# Patient Record
Sex: MALE | Race: WHITE
[De-identification: names, ages, dates, MRNs, and addresses within clinical notes are randomized per-mention and may not be internally consistent; named-entity substitution may affect disease eponyms.]

---

## 2015-06-10 VITALS
DIASTOLIC BLOOD PRESSURE: 78 MMHG | SYSTOLIC BLOOD PRESSURE: 153 MMHG | SYSTOLIC BLOOD PRESSURE: 153 MMHG | DIASTOLIC BLOOD PRESSURE: 78 MMHG

## 2017-02-20 ENCOUNTER — HOSPITAL ENCOUNTER (OUTPATIENT)
Dept: HOSPITAL 61 - CT | Age: 67
Discharge: HOME | End: 2017-02-20
Attending: FAMILY MEDICINE
Payer: MEDICARE

## 2017-02-20 DIAGNOSIS — I25.10: ICD-10-CM

## 2017-02-20 DIAGNOSIS — R63.4: ICD-10-CM

## 2017-02-20 DIAGNOSIS — R91.1: ICD-10-CM

## 2017-02-20 DIAGNOSIS — F17.210: Primary | ICD-10-CM

## 2017-02-20 DIAGNOSIS — N28.1: ICD-10-CM

## 2017-02-20 DIAGNOSIS — J43.9: ICD-10-CM

## 2017-02-20 PROCEDURE — 71260 CT THORAX DX C+: CPT

## 2017-02-20 NOTE — RAD
CT of the chest with contrast, 2/20/2017:



History: Weight loss, smoker



Multidetector CT imaging was performed following an IV bolus injection of

iodinated contrast material.



There is calcific plaquing of the thoracic aorta without evidence of aneurysm.

Scattered coronary artery calcifications are also present. Several small

mediastinal and bilateral hilar lymph nodes are seen without definite

pathologic enlargement.



There is a small area of pleural thickening laterally in the left upper lobe

with minimal underlying linear and groundglass opacity compatible with

scarring. There are mild emphysematous changes in the lungs. There are a few

other small scattered peripheral linear opacities compatible with scars.



A small nodule is noted in the left lower lobe along the posterior aspect of

the left hilum as best delineated on axial images 80 and 89 of series #6. It

measures approximately 8-9 mm. It lies directly adjacent to several pulmonary

veins but is larger these adjacent branching structures. There is a focus of

increased density within this structure, although it is unclear whether this

represents an enhancing vessel or a calcification on these postcontrast scans.



No other pulmonary nodule or mass is seen. There is no evidence of pleural

fluid.



Two right renal cysts and at least one small left renal cyst are noted. The

largest cyst measures 6 cm and lies in the upper pole the right kidney. No

adrenal abnormality is detected.





IMPRESSION:

1. Emphysema with pleural/parenchymal scarring.

2. Small left lower lobe pulmonary nodule containing a small density

compatible with a calcification versus an enhancing vessel. Short interval CT

follow-up to include pre and postcontrast scans is suggested for further

evaluation. 

3. Calcific plaquing of the aorta and coronary arteries.

4. Bilateral renal cysts.











PQRS Compliance Statement:



One or more of the following individualized dose reduction techniques were

utilized for this examination:

1. Automated exposure control

2. Adjustment of the mA and/or kV according to patient size

3. Use of iterative reconstruction technique

## 2017-04-10 ENCOUNTER — HOSPITAL ENCOUNTER (OUTPATIENT)
Dept: HOSPITAL 61 - KCIC | Age: 67
Discharge: HOME | End: 2017-04-10
Attending: PHYSICIAN ASSISTANT
Payer: MEDICARE

## 2017-04-10 DIAGNOSIS — F17.200: ICD-10-CM

## 2017-04-10 DIAGNOSIS — M54.9: ICD-10-CM

## 2017-04-10 DIAGNOSIS — J43.9: Primary | ICD-10-CM

## 2017-04-10 PROCEDURE — 71020: CPT

## 2017-04-10 NOTE — [facility]
PROCEDURE 

Two view chest radiograph. 

 

HISTORY 

Abnormal lung sounds. Left-sided pain for 3-4 days. Smoking history. COPD.

 

 

TECHNIQUE 

Two-view chest radiograph was obtained. 

 

COMPARISON 

CT from February 20, 2017 believed to be the same patient. 

 

FINDINGS 

The lungs are mildly hyperinflated with flattening of the diaphragm and 

increase in retrosternal clear space. There is no airspace disease. There 

is no pleural effusion. The heart is not enlarged. There is no heart 

failure. Bony structures are intact. 

 

IMPRESSION 

No acute thoracic findings. Hyperinflation compatible with emphysema. 

 

Electronically signed by: Ethan Aguero MD (Apr 10, 2017 13:41:59)

## 2017-04-25 VITALS — SYSTOLIC BLOOD PRESSURE: 137 MMHG | DIASTOLIC BLOOD PRESSURE: 72 MMHG

## 2017-08-14 ENCOUNTER — HOSPITAL ENCOUNTER (OUTPATIENT)
Dept: HOSPITAL 61 - KCIC | Age: 67
Discharge: HOME | End: 2017-08-14
Attending: PHYSICIAN ASSISTANT
Payer: COMMERCIAL

## 2017-08-14 DIAGNOSIS — J20.9: ICD-10-CM

## 2017-08-14 DIAGNOSIS — J44.9: Primary | ICD-10-CM

## 2017-08-14 PROCEDURE — 71020: CPT

## 2017-08-14 NOTE — [facility]
Indication: COPD and acute bronchitis

 

Technique: Two-view chest radiograph was obtained. Comparison is from 

April 10, 2017.

 

Findings: The lungs are clear. They remain hyperinflated. The 

cardiopulmonary silhouette is within normal limits. There is mild 

atheromatous disease in the thoracic aorta. There is no bronchial wall 

thickening. There is no pleural effusion. There are degenerative changes 

in the spine, minimal. 

 

Impression:

No active pulmonary disease.

 

Electronically signed by: Ethan Aguero MD (8/14/2017 2:38 PM) 

[id number]

## 2017-12-23 ENCOUNTER — HOSPITAL ENCOUNTER (EMERGENCY)
Dept: HOSPITAL 61 - ER | Age: 67
Discharge: LEFT BEFORE DISCHARGE | End: 2017-12-23
Payer: COMMERCIAL

## 2017-12-23 DIAGNOSIS — I10: ICD-10-CM

## 2017-12-23 DIAGNOSIS — I21.9: ICD-10-CM

## 2017-12-23 DIAGNOSIS — Z88.5: ICD-10-CM

## 2017-12-23 DIAGNOSIS — Z90.49: ICD-10-CM

## 2017-12-23 DIAGNOSIS — J44.1: Primary | ICD-10-CM

## 2017-12-23 DIAGNOSIS — Z88.0: ICD-10-CM

## 2017-12-23 DIAGNOSIS — I25.10: ICD-10-CM

## 2017-12-23 DIAGNOSIS — Z86.73: ICD-10-CM

## 2017-12-23 DIAGNOSIS — E78.00: ICD-10-CM

## 2017-12-23 LAB
ADD MAN DIFF?: NO
ALBUMIN SERPL-MCNC: 3.6 G/DL (ref 3.4–5)
ALP SERPL-CCNC: 87 U/L (ref 46–116)
ALT (SGPT): 29 U/L (ref 16–63)
ANION GAP SERPL CALC-SCNC: 7 MMOL/L (ref 6–14)
AST SERPL-CCNC: 26 U/L (ref 15–37)
BASO #: 0.1 X10^3/UL (ref 0–0.2)
BASO %: 1 % (ref 0–3)
BILIRUB DIRECT SERPL-MCNC: 0.1 MG/DL (ref 0–0.2)
BLOOD UREA NITROGEN: 17 MG/DL (ref 8–26)
CALCIUM: 8.8 MG/DL (ref 8.5–10.1)
CHLORIDE: 105 MMOL/L (ref 98–107)
CO2 SERPL-SCNC: 32 MMOL/L (ref 21–32)
CREAT SERPL-MCNC: 0.9 MG/DL (ref 0.7–1.3)
EOS %: 3 % (ref 0–3)
GFR SERPLBLD BASED ON 1.73 SQ M-ARVRAT: 83.9 ML/MIN
GLUCOSE SERPL-MCNC: 124 MG/DL (ref 70–99)
HCG SERPL-ACNC: 8.6 X10^3/UL (ref 4–11)
HEMATOCRIT: 44.4 % (ref 39–53)
HEMOGLOBIN: 15 G/DL (ref 13–17.5)
LYMPH #: 1.2 X10^3/UL (ref 1–4.8)
LYMPH %: 14 % (ref 24–48)
MEAN CORPUSCULAR HEMOGLOBIN: 30 PG (ref 25–35)
MEAN CORPUSCULAR HGB CONC: 34 G/DL (ref 31–37)
MEAN CORPUSCULAR VOLUME: 89 FL (ref 79–100)
MONO %: 6 % (ref 0–9)
NEUT %: 76 % (ref 31–73)
NT-PRO BNP: 1485 PG/ML (ref 0–124)
PLATELET COUNT: 178 X10^3/UL (ref 140–400)
POTASSIUM SERPL-SCNC: 3.3 MMOL/L (ref 3.5–5.1)
RED BLOOD COUNT: 4.97 X10^6/UL (ref 4.3–5.7)
RED CELL DISTRIBUTION WIDTH: 15 % (ref 11.5–14.5)
SODIUM: 144 MMOL/L (ref 136–145)
TOTAL BILIRUBIN: 0.3 MG/DL (ref 0.2–1)
TOTAL PROTEIN: 7.6 G/DL (ref 6.4–8.2)
TROPONINI: 0.1 NG/ML (ref 0–0.06)

## 2017-12-23 PROCEDURE — 71010: CPT

## 2017-12-23 PROCEDURE — 93005 ELECTROCARDIOGRAM TRACING: CPT

## 2017-12-23 PROCEDURE — 83690 ASSAY OF LIPASE: CPT

## 2017-12-23 PROCEDURE — 85025 COMPLETE CBC W/AUTO DIFF WBC: CPT

## 2017-12-23 PROCEDURE — 99285 EMERGENCY DEPT VISIT HI MDM: CPT

## 2017-12-23 PROCEDURE — 96374 THER/PROPH/DIAG INJ IV PUSH: CPT

## 2017-12-23 PROCEDURE — 36415 COLL VENOUS BLD VENIPUNCTURE: CPT

## 2017-12-23 PROCEDURE — 83880 ASSAY OF NATRIURETIC PEPTIDE: CPT

## 2017-12-23 PROCEDURE — 80076 HEPATIC FUNCTION PANEL: CPT

## 2017-12-23 PROCEDURE — 84484 ASSAY OF TROPONIN QUANT: CPT

## 2017-12-23 PROCEDURE — 80048 BASIC METABOLIC PNL TOTAL CA: CPT

## 2017-12-23 PROCEDURE — 94640 AIRWAY INHALATION TREATMENT: CPT

## 2017-12-23 RX ADMIN — IPRATROPIUM BROMIDE AND ALBUTEROL SULFATE 1 ML: .5; 3 SOLUTION RESPIRATORY (INHALATION) at 22:44

## 2017-12-23 RX ADMIN — ASPIRIN 81 MG 1 MG: 81 TABLET ORAL at 22:33

## 2017-12-23 RX ADMIN — METHYLPREDNISOLONE SODIUM SUCCINATE 1 MG: 125 INJECTION, POWDER, FOR SOLUTION INTRAMUSCULAR; INTRAVENOUS at 21:29

## 2017-12-23 RX ADMIN — IPRATROPIUM BROMIDE AND ALBUTEROL SULFATE 1 ML: .5; 3 SOLUTION RESPIRATORY (INHALATION) at 21:23

## 2018-01-11 ENCOUNTER — HOSPITAL ENCOUNTER (OUTPATIENT)
Dept: HOSPITAL 61 - ECHO | Age: 68
Discharge: HOME | End: 2018-01-11
Attending: PHYSICIAN ASSISTANT
Payer: COMMERCIAL

## 2018-01-11 DIAGNOSIS — I10: Primary | ICD-10-CM

## 2018-01-11 DIAGNOSIS — Z86.73: ICD-10-CM

## 2018-01-11 DIAGNOSIS — R79.89: ICD-10-CM

## 2018-01-11 DIAGNOSIS — I49.3: ICD-10-CM

## 2018-01-11 DIAGNOSIS — J44.9: ICD-10-CM

## 2018-01-11 DIAGNOSIS — F17.210: ICD-10-CM

## 2018-01-11 DIAGNOSIS — I73.9: ICD-10-CM

## 2018-01-11 PROCEDURE — 93017 CV STRESS TEST TRACING ONLY: CPT

## 2018-01-11 PROCEDURE — 93350 STRESS TTE ONLY: CPT

## 2018-02-02 ENCOUNTER — HOSPITAL ENCOUNTER (OUTPATIENT)
Dept: HOSPITAL 61 - CT | Age: 68
Discharge: HOME | End: 2018-02-02
Attending: FAMILY MEDICINE
Payer: COMMERCIAL

## 2018-02-02 DIAGNOSIS — J43.9: ICD-10-CM

## 2018-02-02 DIAGNOSIS — Z12.2: Primary | ICD-10-CM

## 2018-02-02 DIAGNOSIS — I25.10: ICD-10-CM

## 2018-02-02 DIAGNOSIS — N20.0: ICD-10-CM

## 2018-02-02 DIAGNOSIS — R91.8: ICD-10-CM

## 2018-02-02 PROCEDURE — 71250 CT THORAX DX C-: CPT

## 2018-02-09 ENCOUNTER — HOSPITAL ENCOUNTER (OUTPATIENT)
Dept: HOSPITAL 61 - CCL | Age: 68
Discharge: HOME | End: 2018-02-09
Attending: INTERNAL MEDICINE
Payer: COMMERCIAL

## 2018-02-09 DIAGNOSIS — Z86.69: ICD-10-CM

## 2018-02-09 DIAGNOSIS — Z88.0: ICD-10-CM

## 2018-02-09 DIAGNOSIS — J43.9: ICD-10-CM

## 2018-02-09 DIAGNOSIS — Z88.6: ICD-10-CM

## 2018-02-09 DIAGNOSIS — Z72.0: ICD-10-CM

## 2018-02-09 DIAGNOSIS — F17.200: ICD-10-CM

## 2018-02-09 DIAGNOSIS — I10: ICD-10-CM

## 2018-02-09 DIAGNOSIS — Z86.73: ICD-10-CM

## 2018-02-09 DIAGNOSIS — I25.10: Primary | ICD-10-CM

## 2018-02-09 LAB
ANION GAP SERPL CALC-SCNC: 6 MMOL/L (ref 6–14)
BLOOD UREA NITROGEN: 25 MG/DL (ref 8–26)
CALCIUM: 8.5 MG/DL (ref 8.5–10.1)
CHLORIDE: 105 MMOL/L (ref 98–107)
CO2 SERPL-SCNC: 32 MMOL/L (ref 21–32)
CREAT SERPL-MCNC: 0.9 MG/DL (ref 0.7–1.3)
GFR SERPLBLD BASED ON 1.73 SQ M-ARVRAT: 83.9 ML/MIN
GLUCOSE SERPL-MCNC: 100 MG/DL (ref 70–99)
HCG SERPL-ACNC: 6.9 X10^3/UL (ref 4–11)
HEMATOCRIT: 44.2 % (ref 39–53)
HEMOGLOBIN: 14.4 G/DL (ref 13–17.5)
INR: 1 (ref 0.8–1.1)
MEAN CORPUSCULAR HEMOGLOBIN: 29 PG (ref 25–35)
MEAN CORPUSCULAR HGB CONC: 33 G/DL (ref 31–37)
MEAN CORPUSCULAR VOLUME: 88 FL (ref 79–100)
PLATELET COUNT: 194 X10^3/UL (ref 140–400)
POTASSIUM SERPL-SCNC: 3.8 MMOL/L (ref 3.5–5.1)
PROTHROMBIN TIME PATIENT: 12.5 SEC (ref 11.7–14)
RED BLOOD COUNT: 5.02 X10^6/UL (ref 4.3–5.7)
RED CELL DISTRIBUTION WIDTH: 15.3 % (ref 11.5–14.5)
SODIUM: 143 MMOL/L (ref 136–145)

## 2018-02-09 PROCEDURE — 93458 L HRT ARTERY/VENTRICLE ANGIO: CPT

## 2018-02-09 PROCEDURE — 85027 COMPLETE CBC AUTOMATED: CPT

## 2018-02-09 PROCEDURE — 36247 INS CATH ABD/L-EXT ART 3RD: CPT

## 2018-02-09 PROCEDURE — 85610 PROTHROMBIN TIME: CPT

## 2018-02-09 PROCEDURE — 99152 MOD SED SAME PHYS/QHP 5/>YRS: CPT

## 2018-02-09 PROCEDURE — 75630 X-RAY AORTA LEG ARTERIES: CPT

## 2018-02-09 PROCEDURE — 80048 BASIC METABOLIC PNL TOTAL CA: CPT

## 2018-02-09 PROCEDURE — 99153 MOD SED SAME PHYS/QHP EA: CPT

## 2018-02-09 PROCEDURE — 36415 COLL VENOUS BLD VENIPUNCTURE: CPT

## 2018-02-09 RX ADMIN — LIDOCAINE HYDROCHLORIDE 1 ML: 20 INJECTION, SOLUTION INFILTRATION; PERINEURAL at 08:40

## 2018-02-09 RX ADMIN — MIDAZOLAM HYDROCHLORIDE 1 MG: 1 INJECTION, SOLUTION INTRAMUSCULAR; INTRAVENOUS at 08:42

## 2018-02-09 RX ADMIN — FENTANYL CITRATE 1 MCG: 50 INJECTION INTRAMUSCULAR; INTRAVENOUS at 08:41

## 2018-02-09 RX ADMIN — HEPARIN SODIUM IN SODIUM CHLORIDE 1 UNIT: 200 INJECTION INTRAVENOUS at 08:42

## 2018-02-09 RX ADMIN — IODIXANOL 2 ML: 320 INJECTION, SOLUTION INTRAVASCULAR at 08:00

## 2018-02-15 ENCOUNTER — HOSPITAL ENCOUNTER (OUTPATIENT)
Dept: HOSPITAL 61 - PETSC | Age: 68
Discharge: HOME | End: 2018-02-15
Attending: PHYSICIAN ASSISTANT
Payer: COMMERCIAL

## 2018-02-15 DIAGNOSIS — N40.0: ICD-10-CM

## 2018-02-15 DIAGNOSIS — I25.10: ICD-10-CM

## 2018-02-15 DIAGNOSIS — N28.1: ICD-10-CM

## 2018-02-15 DIAGNOSIS — J43.2: Primary | ICD-10-CM

## 2018-02-15 PROCEDURE — 78815 PET IMAGE W/CT SKULL-THIGH: CPT

## 2018-02-27 ENCOUNTER — HOSPITAL ENCOUNTER (OUTPATIENT)
Dept: HOSPITAL 61 - CCL | Age: 68
Setting detail: OBSERVATION
LOS: 1 days | Discharge: HOME | End: 2018-02-28
Attending: INTERNAL MEDICINE | Admitting: INTERNAL MEDICINE
Payer: COMMERCIAL

## 2018-02-27 DIAGNOSIS — I73.9: Primary | ICD-10-CM

## 2018-02-27 DIAGNOSIS — I49.3: ICD-10-CM

## 2018-02-27 DIAGNOSIS — Z88.0: ICD-10-CM

## 2018-02-27 LAB
ANION GAP SERPL CALC-SCNC: 7 MMOL/L (ref 6–14)
BLOOD UREA NITROGEN: 22 MG/DL (ref 8–26)
CALCIUM: 9.3 MG/DL (ref 8.5–10.1)
CHLORIDE: 104 MMOL/L (ref 98–107)
CO2 SERPL-SCNC: 31 MMOL/L (ref 21–32)
CREAT SERPL-MCNC: 1 MG/DL (ref 0.7–1.3)
GFR SERPLBLD BASED ON 1.73 SQ M-ARVRAT: 74.3 ML/MIN
GLUCOSE SERPL-MCNC: 94 MG/DL (ref 70–99)
HCG SERPL-ACNC: 8.1 X10^3/UL (ref 4–11)
HEMATOCRIT: 43.2 % (ref 39–53)
HEMOGLOBIN: 14.1 G/DL (ref 13–17.5)
INR: 1 (ref 0.8–1.1)
ISTAT ACT: 180 SEC (ref 92–181)
ISTAT ACT: 249 SEC (ref 92–181)
ISTAT ACT: 266 SEC (ref 92–181)
MEAN CORPUSCULAR HEMOGLOBIN: 29 PG (ref 25–35)
MEAN CORPUSCULAR HGB CONC: 33 G/DL (ref 31–37)
MEAN CORPUSCULAR VOLUME: 88 FL (ref 79–100)
PLATELET COUNT: 166 X10^3/UL (ref 140–400)
POTASSIUM SERPL-SCNC: 3.5 MMOL/L (ref 3.5–5.1)
PROTHROMBIN TIME PATIENT: 12.5 SEC (ref 11.7–14)
RED BLOOD COUNT: 4.93 X10^6/UL (ref 4.3–5.7)
RED CELL DISTRIBUTION WIDTH: 16 % (ref 11.5–14.5)
SODIUM: 142 MMOL/L (ref 136–145)

## 2018-02-27 PROCEDURE — 99153 MOD SED SAME PHYS/QHP EA: CPT

## 2018-02-27 PROCEDURE — 85027 COMPLETE CBC AUTOMATED: CPT

## 2018-02-27 PROCEDURE — 85610 PROTHROMBIN TIME: CPT

## 2018-02-27 PROCEDURE — 93005 ELECTROCARDIOGRAM TRACING: CPT

## 2018-02-27 PROCEDURE — 36415 COLL VENOUS BLD VENIPUNCTURE: CPT

## 2018-02-27 PROCEDURE — 96374 THER/PROPH/DIAG INJ IV PUSH: CPT

## 2018-02-27 PROCEDURE — 37226: CPT

## 2018-02-27 PROCEDURE — 96375 TX/PRO/DX INJ NEW DRUG ADDON: CPT

## 2018-02-27 PROCEDURE — 80048 BASIC METABOLIC PNL TOTAL CA: CPT

## 2018-02-27 PROCEDURE — 94640 AIRWAY INHALATION TREATMENT: CPT

## 2018-02-27 PROCEDURE — 85347 COAGULATION TIME ACTIVATED: CPT

## 2018-02-27 PROCEDURE — 99152 MOD SED SAME PHYS/QHP 5/>YRS: CPT

## 2018-02-27 RX ADMIN — MIDAZOLAM HYDROCHLORIDE 1 MG: 1 INJECTION, SOLUTION INTRAMUSCULAR; INTRAVENOUS at 10:44

## 2018-02-27 RX ADMIN — HEPARIN SODIUM 2 UNIT: 1000 INJECTION, SOLUTION INTRAVENOUS; SUBCUTANEOUS at 10:47

## 2018-02-27 RX ADMIN — IPRATROPIUM BROMIDE AND ALBUTEROL SULFATE 1 ML: .5; 3 SOLUTION RESPIRATORY (INHALATION) at 20:37

## 2018-02-27 RX ADMIN — ACETAMINOPHEN 1 MG: 325 TABLET, FILM COATED ORAL at 20:31

## 2018-02-27 RX ADMIN — FENTANYL CITRATE 1 MCG: 50 INJECTION INTRAMUSCULAR; INTRAVENOUS at 10:44

## 2018-02-27 RX ADMIN — IPRATROPIUM BROMIDE AND ALBUTEROL SULFATE 1 ML: .5; 3 SOLUTION RESPIRATORY (INHALATION) at 15:54

## 2018-02-27 RX ADMIN — NITROGLYCERIN 1 MCG: 5 INJECTION, SOLUTION INTRAVENOUS at 10:55

## 2018-02-27 RX ADMIN — SIMVASTATIN 1 MG: 40 TABLET, FILM COATED ORAL at 20:28

## 2018-02-27 RX ADMIN — HEPARIN SODIUM IN SODIUM CHLORIDE 2 UNIT: 200 INJECTION INTRAVENOUS at 10:40

## 2018-02-27 RX ADMIN — LIDOCAINE HYDROCHLORIDE 1 ML: 20 INJECTION, SOLUTION INFILTRATION; PERINEURAL at 10:43

## 2018-02-27 RX ADMIN — IODIXANOL 1 ML: 320 INJECTION, SOLUTION INTRAVASCULAR at 10:45

## 2018-02-27 RX ADMIN — PROTAMINE SULFATE 1 MG: 10 INJECTION, SOLUTION INTRAVENOUS at 11:30

## 2018-02-28 RX ADMIN — CLOPIDOGREL BISULFATE 1 MG: 75 TABLET ORAL at 08:32

## 2018-02-28 RX ADMIN — IPRATROPIUM BROMIDE AND ALBUTEROL SULFATE 1 ML: .5; 3 SOLUTION RESPIRATORY (INHALATION) at 08:10

## 2018-02-28 RX ADMIN — IPRATROPIUM BROMIDE AND ALBUTEROL SULFATE 1 ML: .5; 3 SOLUTION RESPIRATORY (INHALATION) at 11:57

## 2018-02-28 RX ADMIN — ASPIRIN 1 MG: 81 TABLET, COATED ORAL at 08:32

## 2018-02-28 RX ADMIN — LOSARTAN POTASSIUM 1 MG: 50 TABLET ORAL at 08:32

## 2018-06-19 ENCOUNTER — HOSPITAL ENCOUNTER (OUTPATIENT)
Dept: HOSPITAL 61 - KCIC CT | Age: 68
Discharge: HOME | End: 2018-06-19
Attending: FAMILY MEDICINE
Payer: COMMERCIAL

## 2018-06-19 DIAGNOSIS — R91.8: Primary | ICD-10-CM

## 2018-06-19 PROCEDURE — 71250 CT THORAX DX C-: CPT

## 2019-02-28 ENCOUNTER — HOSPITAL ENCOUNTER (INPATIENT)
Dept: HOSPITAL 61 - ER | Age: 69
LOS: 6 days | Discharge: HOME HEALTH | DRG: 871 | End: 2019-03-06
Attending: FAMILY MEDICINE | Admitting: FAMILY MEDICINE
Payer: COMMERCIAL

## 2019-02-28 VITALS — BODY MASS INDEX: 23.75 KG/M2 | HEIGHT: 72 IN | WEIGHT: 175.37 LBS

## 2019-02-28 DIAGNOSIS — Z99.81: ICD-10-CM

## 2019-02-28 DIAGNOSIS — Z88.0: ICD-10-CM

## 2019-02-28 DIAGNOSIS — Z87.01: ICD-10-CM

## 2019-02-28 DIAGNOSIS — Z79.01: ICD-10-CM

## 2019-02-28 DIAGNOSIS — Z88.8: ICD-10-CM

## 2019-02-28 DIAGNOSIS — Z86.73: ICD-10-CM

## 2019-02-28 DIAGNOSIS — J96.21: ICD-10-CM

## 2019-02-28 DIAGNOSIS — I25.10: ICD-10-CM

## 2019-02-28 DIAGNOSIS — D69.6: ICD-10-CM

## 2019-02-28 DIAGNOSIS — I47.1: ICD-10-CM

## 2019-02-28 DIAGNOSIS — A41.9: Primary | ICD-10-CM

## 2019-02-28 DIAGNOSIS — F17.211: ICD-10-CM

## 2019-02-28 DIAGNOSIS — Z86.718: ICD-10-CM

## 2019-02-28 DIAGNOSIS — J44.0: ICD-10-CM

## 2019-02-28 DIAGNOSIS — Z90.49: ICD-10-CM

## 2019-02-28 DIAGNOSIS — I10: ICD-10-CM

## 2019-02-28 DIAGNOSIS — I73.9: ICD-10-CM

## 2019-02-28 DIAGNOSIS — E78.00: ICD-10-CM

## 2019-02-28 DIAGNOSIS — J44.1: ICD-10-CM

## 2019-02-28 DIAGNOSIS — E78.5: ICD-10-CM

## 2019-02-28 DIAGNOSIS — I24.8: ICD-10-CM

## 2019-02-28 DIAGNOSIS — J18.9: ICD-10-CM

## 2019-02-28 DIAGNOSIS — J96.22: ICD-10-CM

## 2019-02-28 LAB
ALBUMIN SERPL-MCNC: 3.2 G/DL (ref 3.4–5)
ALBUMIN/GLOB SERPL: 0.7 {RATIO} (ref 1–1.7)
ALP SERPL-CCNC: 103 U/L (ref 46–116)
ALT SERPL-CCNC: 51 U/L (ref 16–63)
ANION GAP SERPL CALC-SCNC: 4 MMOL/L (ref 6–14)
AST SERPL-CCNC: 91 U/L (ref 15–37)
BASE EXCESS ABG: 5 MMOL/L (ref -3–3)
BASOPHILS # BLD AUTO: 0 X10^3/UL (ref 0–0.2)
BASOPHILS NFR BLD: 1 % (ref 0–3)
BILIRUB SERPL-MCNC: 0.3 MG/DL (ref 0.2–1)
BUN SERPL-MCNC: 29 MG/DL (ref 8–26)
BUN/CREAT SERPL: 29 (ref 6–20)
CALCIUM SERPL-MCNC: 8.8 MG/DL (ref 8.5–10.1)
CHLORIDE SERPL-SCNC: 104 MMOL/L (ref 98–107)
CO2 SERPL-SCNC: 38 MMOL/L (ref 21–32)
CREAT SERPL-MCNC: 1 MG/DL (ref 0.7–1.3)
EOSINOPHIL NFR BLD: 0 % (ref 0–3)
EOSINOPHIL NFR BLD: 0 X10^3/UL (ref 0–0.7)
ERYTHROCYTE [DISTWIDTH] IN BLOOD BY AUTOMATED COUNT: 16.2 % (ref 11.5–14.5)
GFR SERPLBLD BASED ON 1.73 SQ M-ARVRAT: 74.1 ML/MIN
GLOBULIN SER-MCNC: 4.7 G/DL (ref 2.2–3.8)
GLUCOSE SERPL-MCNC: 103 MG/DL (ref 70–99)
HCO3 BLDA-SCNC: 36 MMOL/L (ref 21–28)
HCT VFR BLD CALC: 46 % (ref 39–53)
HGB BLD-MCNC: 14.8 G/DL (ref 13–17.5)
INSPIRATION SETTING TIME VENT: 50
LYMPHOCYTES # BLD: 0.5 X10^3/UL (ref 1–4.8)
LYMPHOCYTES NFR BLD AUTO: 10 % (ref 24–48)
MCH RBC QN AUTO: 28 PG (ref 25–35)
MCHC RBC AUTO-ENTMCNC: 32 G/DL (ref 31–37)
MCV RBC AUTO: 88 FL (ref 79–100)
MONO #: 0.3 X10^3/UL (ref 0–1.1)
MONOCYTES NFR BLD: 7 % (ref 0–9)
NEUT #: 4.2 X10^3UL (ref 1.8–7.7)
NEUTROPHILS NFR BLD AUTO: 83 % (ref 31–73)
PCO2 BLDA: 92 MMHG (ref 35–46)
PLATELET # BLD AUTO: 167 X10^3/UL (ref 140–400)
PO2 BLDA: 65 MMHG (ref 65–108)
POTASSIUM SERPL-SCNC: 4.1 MMOL/L (ref 3.5–5.1)
PROT SERPL-MCNC: 7.9 G/DL (ref 6.4–8.2)
RBC # BLD AUTO: 5.22 X10^6/UL (ref 4.3–5.7)
SAO2 % BLDA: 92 % (ref 92–99)
SODIUM SERPL-SCNC: 146 MMOL/L (ref 136–145)
WBC # BLD AUTO: 5 X10^3/UL (ref 4–11)

## 2019-02-28 PROCEDURE — 71275 CT ANGIOGRAPHY CHEST: CPT

## 2019-02-28 PROCEDURE — 94799 UNLISTED PULMONARY SVC/PX: CPT

## 2019-02-28 PROCEDURE — 83605 ASSAY OF LACTIC ACID: CPT

## 2019-02-28 PROCEDURE — 93306 TTE W/DOPPLER COMPLETE: CPT

## 2019-02-28 PROCEDURE — 5A09457 ASSISTANCE WITH RESPIRATORY VENTILATION, 24-96 CONSECUTIVE HOURS, CONTINUOUS POSITIVE AIRWAY PRESSURE: ICD-10-PCS | Performed by: FAMILY MEDICINE

## 2019-02-28 PROCEDURE — 96365 THER/PROPH/DIAG IV INF INIT: CPT

## 2019-02-28 PROCEDURE — 96368 THER/DIAG CONCURRENT INF: CPT

## 2019-02-28 PROCEDURE — 94644 CONT INHLJ TX 1ST HOUR: CPT

## 2019-02-28 PROCEDURE — 84484 ASSAY OF TROPONIN QUANT: CPT

## 2019-02-28 PROCEDURE — 87641 MR-STAPH DNA AMP PROBE: CPT

## 2019-02-28 PROCEDURE — 85379 FIBRIN DEGRADATION QUANT: CPT

## 2019-02-28 PROCEDURE — 85025 COMPLETE CBC W/AUTO DIFF WBC: CPT

## 2019-02-28 PROCEDURE — 36415 COLL VENOUS BLD VENIPUNCTURE: CPT

## 2019-02-28 PROCEDURE — 80048 BASIC METABOLIC PNL TOTAL CA: CPT

## 2019-02-28 PROCEDURE — 85049 AUTOMATED PLATELET COUNT: CPT

## 2019-02-28 PROCEDURE — 93005 ELECTROCARDIOGRAM TRACING: CPT

## 2019-02-28 PROCEDURE — 83735 ASSAY OF MAGNESIUM: CPT

## 2019-02-28 PROCEDURE — 85007 BL SMEAR W/DIFF WBC COUNT: CPT

## 2019-02-28 PROCEDURE — 94618 PULMONARY STRESS TESTING: CPT

## 2019-02-28 PROCEDURE — 82805 BLOOD GASES W/O2 SATURATION: CPT

## 2019-02-28 PROCEDURE — 36600 WITHDRAWAL OF ARTERIAL BLOOD: CPT

## 2019-02-28 PROCEDURE — 87040 BLOOD CULTURE FOR BACTERIA: CPT

## 2019-02-28 PROCEDURE — 71045 X-RAY EXAM CHEST 1 VIEW: CPT

## 2019-02-28 PROCEDURE — 96375 TX/PRO/DX INJ NEW DRUG ADDON: CPT

## 2019-02-28 PROCEDURE — 83880 ASSAY OF NATRIURETIC PEPTIDE: CPT

## 2019-02-28 PROCEDURE — 94760 N-INVAS EAR/PLS OXIMETRY 1: CPT

## 2019-02-28 PROCEDURE — 94640 AIRWAY INHALATION TREATMENT: CPT

## 2019-02-28 PROCEDURE — 80053 COMPREHEN METABOLIC PANEL: CPT

## 2019-02-28 PROCEDURE — 85520 HEPARIN ASSAY: CPT

## 2019-02-28 PROCEDURE — 96361 HYDRATE IV INFUSION ADD-ON: CPT

## 2019-02-28 PROCEDURE — 80061 LIPID PANEL: CPT

## 2019-02-28 PROCEDURE — 94660 CPAP INITIATION&MGMT: CPT

## 2019-02-28 RX ADMIN — BACITRACIN SCH MLS/HR: 5000 INJECTION, POWDER, FOR SOLUTION INTRAMUSCULAR at 23:30

## 2019-02-28 NOTE — RAD
EXAM: AP View of the chest

 

DATE: 2/28/2019 9:59 PM

 

INDICATION: Dyspnea

 

COMPARISON: 8/14/2017 

 

FINDINGS:

The heart is not enlarged.

 

Mediastinal and hilar contours are stable. Aortic calcifications are seen.

 

Emphysematous changes are seen. No lobar consolidation. Linear opacities 

right midlung and left lung base likely scarring/atelectasis. 

 

No pleural effusion or pneumothorax.

 

IMPRESSION:

1.  No radiographic evidence for acute cardiopulmonary process.

 

Electronically signed by: Matthew Hardy MD (2/28/2019 11:28 PM) [id number]

## 2019-02-28 NOTE — PHYS DOC
Past Medical History


Past Medical History:  CAD, COPD, CVA, DVT, High Cholesterol, Hypertension


Additional Past Medical Histor:  emphysema


Past Surgical History:  Cholecystectomy, Other


Additional Past Surgical Histo:  Left femoral bypass 2007, Neck


Alcohol Use:  None


Drug Use:  None





Adult General


Chief Complaint


Chief Complaint:  DYSPNEA/RESPIRATOY DISTRESS





HPI


HPI





Patient is a 68-year-old male who presents with report of respiratory distress. 

Patient reportedly has been short of breath for the last couple of days and 

today symptoms have gotten much worse. Patient had been diagnosed with 

pneumonia about 5 days ago and has been taking antibiotics. Medics indicate 

that patient had taken somewhere between 10 and 12 breathing treatments before 

they had arrived that have been over the last 24 hours. They state that upon 

their arrival patient was on 3 L of oxygen and was satting somewhere around 88%

. Patient was clearly fatigued and by the time patient had gotten out to the 

truck his oxygen saturation had dropped down to the 60s. Patient was placed on 

CPAP while in route. Additional history is limited due to severity of patient 

condition.





Review of Systems


Review of Systems





Constitutional: Denies fever or chills []


Respiratory: Positive cough and shortness of breath []


Cardiovascular: No additional information not addressed in HPI []


GI: Denies abdominal pain []


Integument: Denies rash or skin lesions []


Neurologic: Denies headache, focal weakness or sensory changes []





All other systems were reviewed and found to be within normal limits, except as 

documented in this note.





Current Medications


Current Medications





Current Medications








 Medications


  (Trade)  Dose


 Ordered  Sig/Tin  Start Time


 Stop Time Status Last Admin


Dose Admin


 


 Acetaminophen


  (Tylenol)  650 mg  PRN Q4HRS  PRN  2/28/19 23:15


 3/1/19 23:14   





 


 Albuterol Sulfate


  (Ventolin Neb


 Soln)  10 mg  1X  ONCE  2/28/19 22:30


 2/28/19 22:31 DC 2/28/19 22:13


10 MG


 


 Budesonide


  (Pulmicort)  0.5 mg  STK-MED ONCE  2/28/19 22:03


 2/28/19 22:04 DC  





 


 Heparin Sodium


  (Porcine)


  (Heparin Sodium)  1,950 unit  PRN Q6HRS  PRN  2/28/19 23:15


     





 


 Heparin Sodium/


 Dextrose  500 ml @ 0


 mls/hr  CONT  PRN  2/28/19 23:15


    2/28/19 23:41


12 MLS/HR


 


 Info


  (Anti-Coagulation


 Monitoring By


 Pharmacy)  1 each  PRN DAILY  PRN  2/28/19 23:15


     





 


 Ipratropium


 Bromide


  (Atrovent)  0.5 mg  1X  ONCE  2/28/19 22:30


 2/28/19 22:31 DC 2/28/19 22:16


0.5 MG


 


 Magnesium Sulfate  50 ml @ 25


 mls/hr  1X  ONCE  2/28/19 22:30


 3/1/19 00:29 DC 2/28/19 22:32


25 MLS/HR


 


 Methylprednisolone


 Sodium Succinate


  (SOLU-Medrol


 125MG VIAL)  125 mg  1X  ONCE  2/28/19 22:30


 2/28/19 22:31 DC 2/28/19 22:28


125 MG


 


 Morphine Sulfate


  (Morphine


 Sulfate)  4 mg  PRN Q2HR  PRN  2/28/19 23:15


 3/1/19 23:14   





 


 Nitroglycerin


  (Nitrostat)  0.4 mg  PRN Q5MIN  PRN  2/28/19 23:15


 3/1/19 23:14   





 


 Ondansetron HCl


  (Zofran)  4 mg  PRN Q8HRS  PRN  2/28/19 23:15


 3/1/19 23:14   





 


 Sodium Chloride  1,000 ml @ 


 100 mls/hr  Q10H  2/28/19 22:30


 3/1/19 08:29  2/28/19 22:29


100 MLS/HR











Allergies


Allergies





Allergies








Coded Allergies Type Severity Reaction Last Updated Verified


 


  Penicillins Allergy Severe Shortness of Air 1/15/14 Yes


 


  codeine Allergy Severe Shortness of Air 1/15/14 Yes











Physical Exam


Physical Exam





Constitutional: Well developed, well nourished, in moderate respiratory 

distress. []


HENT: Normocephalic, atraumatic, bilateral external ears normal, oropharynx 

moist, no oral exudates, nose normal. []


Eyes: PERRLA, EOMI, conjunctiva normal, no discharge. [] 


Neck: Normal range of motion, no tenderness, supple. [] 


Cardiovascular: Mildly tachycardic rate with regular rhythm[]


Lungs & Thorax: Markedly diminished breath sounds are noted bilaterally with 

faint expiratory wheezes[]


Abdomen: Bowel sounds normal, soft, no tenderness. [] 


Skin: Warm, dry, no erythema, no rash. [] 


Extremities: No tenderness, no cyanosis, no clubbing, ROM intact. [] 


Neurologic: Awake and alert, no focal deficits noted. []





Current Patient Data


Vital Signs





 Vital Signs








  Date Time  Temp Pulse Resp B/P (MAP) Pulse Ox O2 Delivery O2 Flow Rate FiO2


 


2/28/19 23:05  94 24 142/76 (98) 96 BiPAP/CPAP  


 


2/28/19 21:54 99.7       





 99.7       








Lab Values





 Laboratory Tests








Test


 2/28/19


22:05 2/28/19


22:24


 


White Blood Count


 5.0 x10^3/uL


(4.0-11.0) 





 


Red Blood Count


 5.22 x10^6/uL


(4.30-5.70) 





 


Hemoglobin


 14.8 g/dL


(13.0-17.5) 





 


Hematocrit


 46.0 %


(39.0-53.0) 





 


Mean Corpuscular Volume


 88 fL ()


 





 


Mean Corpuscular Hemoglobin 28 pg (25-35)   


 


Mean Corpuscular Hemoglobin


Concent 32 g/dL


(31-37) 





 


Red Cell Distribution Width


 16.2 %


(11.5-14.5)  H 





 


Platelet Count


 167 x10^3/uL


(140-400) 





 


Neutrophils (%) (Auto) 83 % (31-73)  H 


 


Lymphocytes (%) (Auto) 10 % (24-48)  L 


 


Monocytes (%) (Auto) 7 % (0-9)   


 


Eosinophils (%) (Auto) 0 % (0-3)   


 


Basophils (%) (Auto) 1 % (0-3)   


 


Neutrophils # (Auto)


 4.2 x10^3uL


(1.8-7.7) 





 


Lymphocytes # (Auto)


 0.5 x10^3/uL


(1.0-4.8)  L 





 


Monocytes # (Auto)


 0.3 x10^3/uL


(0.0-1.1) 





 


Eosinophils # (Auto)


 0.0 x10^3/uL


(0.0-0.7) 





 


Basophils # (Auto)


 0.0 x10^3/uL


(0.0-0.2) 





 


D-Dimer (Joanne)


 2.62 ug/mlFEU


(0.00-0.50)  H 





 


Sodium Level


 146 mmol/L


(136-145)  H 





 


Potassium Level


 4.1 mmol/L


(3.5-5.1) 





 


Chloride Level


 104 mmol/L


() 





 


Carbon Dioxide Level


 38 mmol/L


(21-32)  H 





 


Anion Gap 4 (6-14)  L 


 


Blood Urea Nitrogen


 29 mg/dL


(8-26)  H 





 


Creatinine


 1.0 mg/dL


(0.7-1.3) 





 


Estimated GFR


(Cockcroft-Gault) 74.1  


 





 


BUN/Creatinine Ratio 29 (6-20)  H 


 


Glucose Level


 103 mg/dL


(70-99)  H 





 


Lactic Acid Level


 1.4 mmol/L


(0.4-2.0) 





 


Calcium Level


 8.8 mg/dL


(8.5-10.1) 





 


Total Bilirubin


 0.3 mg/dL


(0.2-1.0) 





 


Aspartate Amino Transferase


(AST) 91 U/L (15-37)


H 





 


Alanine Aminotransferase (ALT)


 51 U/L (16-63)


 





 


Alkaline Phosphatase


 103 U/L


() 





 


Troponin I Quantitative


 0.692 ng/mL


(0.000-0.055) 





 


NT-Pro-B-Type Natriuretic


Peptide 1923 pg/mL


(0-124)  H 





 


Total Protein


 7.9 g/dL


(6.4-8.2) 





 


Albumin


 3.2 g/dL


(3.4-5.0)  L 





 


Albumin/Globulin Ratio


 0.7 (1.0-1.7)


L 





 


O2 Saturation  92 % (92-99)  


 


Arterial Blood pH


 


 7.22


(7.35-7.45)  L


 


Arterial Blood pCO2 at


Patient Temp 


 92 mmHg


(35-46)  *H


 


Arterial Blood pO2 at Patient


Temp 


 65 mmHg


()


 


Arterial Blood HCO3


 


 36 mmol/L


(21-28)  H


 


Arterial Blood Base Excess


 


 5 mmol/L


(-3-3)  H


 


FiO2  50  





 Laboratory Tests


2/28/19 22:05








 Laboratory Tests


2/28/19 22:05











EKG


EKG


[]


Interpretation Time:


EKG demonstrates sinus tachycardia with rate of 103.





Radiology/Procedures


Radiology/Procedures


[]





Course & Med Decision Making


Course & Med Decision Making


Pertinent Labs and Imaging studies reviewed. (See chart for details)





A total of 40 minutes of critical care time was spent on this patient exclusive 

of separately billable procedures and was inclusive of direct face-to-face time 

with this patient, ordering and reviewing of both laboratory and radiologic 

studies, discussion of this patient's case with consultant, and on 

documentation of this patient's medical record.





Dragon Disclaimer


Dragon Disclaimer


This electronic medical record was generated, in whole or in part, using a 

voice recognition dictation system.





Departure


Departure


Impression:  


 Primary Impression:  


 Acute respiratory failure with hypercapnia


 Additional Impressions:  


 COPD with acute exacerbation


 Non-STEMI (non-ST elevated myocardial infarction)


Disposition:  09 ADMITTED AS INPATIENT


Admitting Physician:  Gus Carmona


Condition:  GUARDED


Referrals:  


GUS CARMONA MD (PCP)





Problem Qualifiers











SILVIA NGUYỄN Jr. DO Feb 28, 2019 22:16

## 2019-03-01 VITALS — DIASTOLIC BLOOD PRESSURE: 75 MMHG | SYSTOLIC BLOOD PRESSURE: 130 MMHG

## 2019-03-01 VITALS — SYSTOLIC BLOOD PRESSURE: 132 MMHG | DIASTOLIC BLOOD PRESSURE: 65 MMHG

## 2019-03-01 VITALS — SYSTOLIC BLOOD PRESSURE: 148 MMHG | DIASTOLIC BLOOD PRESSURE: 72 MMHG

## 2019-03-01 VITALS — DIASTOLIC BLOOD PRESSURE: 67 MMHG | SYSTOLIC BLOOD PRESSURE: 99 MMHG

## 2019-03-01 VITALS — DIASTOLIC BLOOD PRESSURE: 71 MMHG | SYSTOLIC BLOOD PRESSURE: 138 MMHG

## 2019-03-01 VITALS — DIASTOLIC BLOOD PRESSURE: 56 MMHG | SYSTOLIC BLOOD PRESSURE: 84 MMHG

## 2019-03-01 VITALS — DIASTOLIC BLOOD PRESSURE: 63 MMHG | SYSTOLIC BLOOD PRESSURE: 105 MMHG

## 2019-03-01 VITALS — SYSTOLIC BLOOD PRESSURE: 98 MMHG | DIASTOLIC BLOOD PRESSURE: 62 MMHG

## 2019-03-01 VITALS — SYSTOLIC BLOOD PRESSURE: 161 MMHG | DIASTOLIC BLOOD PRESSURE: 85 MMHG

## 2019-03-01 VITALS — SYSTOLIC BLOOD PRESSURE: 90 MMHG | DIASTOLIC BLOOD PRESSURE: 58 MMHG

## 2019-03-01 VITALS — SYSTOLIC BLOOD PRESSURE: 140 MMHG | DIASTOLIC BLOOD PRESSURE: 83 MMHG

## 2019-03-01 VITALS — SYSTOLIC BLOOD PRESSURE: 96 MMHG | DIASTOLIC BLOOD PRESSURE: 65 MMHG

## 2019-03-01 VITALS — DIASTOLIC BLOOD PRESSURE: 79 MMHG | SYSTOLIC BLOOD PRESSURE: 127 MMHG

## 2019-03-01 VITALS — DIASTOLIC BLOOD PRESSURE: 75 MMHG | SYSTOLIC BLOOD PRESSURE: 180 MMHG

## 2019-03-01 VITALS — DIASTOLIC BLOOD PRESSURE: 69 MMHG | SYSTOLIC BLOOD PRESSURE: 109 MMHG

## 2019-03-01 VITALS — SYSTOLIC BLOOD PRESSURE: 93 MMHG | DIASTOLIC BLOOD PRESSURE: 57 MMHG

## 2019-03-01 VITALS — SYSTOLIC BLOOD PRESSURE: 100 MMHG | DIASTOLIC BLOOD PRESSURE: 61 MMHG

## 2019-03-01 VITALS — DIASTOLIC BLOOD PRESSURE: 68 MMHG | SYSTOLIC BLOOD PRESSURE: 112 MMHG

## 2019-03-01 VITALS — SYSTOLIC BLOOD PRESSURE: 115 MMHG | DIASTOLIC BLOOD PRESSURE: 81 MMHG

## 2019-03-01 VITALS — SYSTOLIC BLOOD PRESSURE: 128 MMHG | DIASTOLIC BLOOD PRESSURE: 59 MMHG

## 2019-03-01 VITALS — DIASTOLIC BLOOD PRESSURE: 56 MMHG | SYSTOLIC BLOOD PRESSURE: 81 MMHG

## 2019-03-01 VITALS — SYSTOLIC BLOOD PRESSURE: 157 MMHG | DIASTOLIC BLOOD PRESSURE: 74 MMHG

## 2019-03-01 VITALS — SYSTOLIC BLOOD PRESSURE: 128 MMHG | DIASTOLIC BLOOD PRESSURE: 77 MMHG

## 2019-03-01 VITALS — DIASTOLIC BLOOD PRESSURE: 55 MMHG | SYSTOLIC BLOOD PRESSURE: 89 MMHG

## 2019-03-01 VITALS — SYSTOLIC BLOOD PRESSURE: 97 MMHG | DIASTOLIC BLOOD PRESSURE: 61 MMHG

## 2019-03-01 VITALS — SYSTOLIC BLOOD PRESSURE: 133 MMHG | DIASTOLIC BLOOD PRESSURE: 79 MMHG

## 2019-03-01 VITALS — SYSTOLIC BLOOD PRESSURE: 131 MMHG | DIASTOLIC BLOOD PRESSURE: 69 MMHG

## 2019-03-01 VITALS — SYSTOLIC BLOOD PRESSURE: 114 MMHG | DIASTOLIC BLOOD PRESSURE: 77 MMHG

## 2019-03-01 VITALS — DIASTOLIC BLOOD PRESSURE: 68 MMHG | SYSTOLIC BLOOD PRESSURE: 122 MMHG

## 2019-03-01 LAB
% BANDS: 32 % (ref 0–9)
% LYMPHS: 5 % (ref 24–48)
% METAS: 1 % (ref 0–0)
% MONOS: 2 % (ref 0–10)
% SEGS: 60 % (ref 35–66)
ANION GAP SERPL CALC-SCNC: 2 MMOL/L (ref 6–14)
BASE EXCESS ABG: 1 MMOL/L (ref -3–3)
BASE EXCESS ABG: 2 MMOL/L (ref -3–3)
BASE EXCESS ABG: 2 MMOL/L (ref -3–3)
BASE EXCESS ABG: 4 MMOL/L (ref -3–3)
BASO STIPL BLD QL SMEAR: PRESENT
BASOPHILS # BLD AUTO: 0 X10^3/UL (ref 0–0.2)
BASOPHILS NFR BLD: 0 % (ref 0–3)
BUN SERPL-MCNC: 30 MG/DL (ref 8–26)
CALCIUM SERPL-MCNC: 8.3 MG/DL (ref 8.5–10.1)
CHLORIDE SERPL-SCNC: 104 MMOL/L (ref 98–107)
CO2 SERPL-SCNC: 32 MMOL/L (ref 21–32)
CREAT SERPL-MCNC: 0.9 MG/DL (ref 0.7–1.3)
EOSINOPHIL NFR BLD: 0 % (ref 0–3)
EOSINOPHIL NFR BLD: 0 X10^3/UL (ref 0–0.7)
ERYTHROCYTE [DISTWIDTH] IN BLOOD BY AUTOMATED COUNT: 15.7 % (ref 11.5–14.5)
GFR SERPLBLD BASED ON 1.73 SQ M-ARVRAT: 83.7 ML/MIN
GLUCOSE SERPL-MCNC: 138 MG/DL (ref 70–99)
HCO3 BLDA-SCNC: 31 MMOL/L (ref 21–28)
HCO3 BLDA-SCNC: 32 MMOL/L (ref 21–28)
HCT VFR BLD CALC: 40.6 % (ref 39–53)
HGB BLD-MCNC: 12.8 G/DL (ref 13–17.5)
INSPIRATION SETTING TIME VENT: 40
INSPIRATION SETTING TIME VENT: 50
LYMPHOCYTES # BLD: 0.2 X10^3/UL (ref 1–4.8)
LYMPHOCYTES NFR BLD AUTO: 5 % (ref 24–48)
MCH RBC QN AUTO: 28 PG (ref 25–35)
MCHC RBC AUTO-ENTMCNC: 31 G/DL (ref 31–37)
MCV RBC AUTO: 89 FL (ref 79–100)
MONO #: 0.1 X10^3/UL (ref 0–1.1)
MONOCYTES NFR BLD: 3 % (ref 0–9)
NEUT #: 3.3 X10^3UL (ref 1.8–7.7)
NEUTROPHILS NFR BLD AUTO: 92 % (ref 31–73)
NRBC # BLD MANUAL: 1 10*3/UL
PCO2 BLDA: 63 MMHG (ref 35–46)
PCO2 BLDA: 81 MMHG (ref 35–46)
PCO2 BLDA: 83 MMHG (ref 35–46)
PCO2 BLDA: 88 MMHG (ref 35–46)
PLATELET # BLD AUTO: 114 X10^3/UL (ref 140–400)
PLATELET # BLD EST: (no result) 10*3/UL
PO2 BLDA: 63 MMHG (ref 65–108)
PO2 BLDA: 64 MMHG (ref 65–108)
PO2 BLDA: 73 MMHG (ref 65–108)
PO2 BLDA: 81 MMHG (ref 65–108)
POTASSIUM SERPL-SCNC: 4.5 MMOL/L (ref 3.5–5.1)
RBC # BLD AUTO: 4.58 X10^6/UL (ref 4.3–5.7)
SAO2 % BLDA: 90 % (ref 92–99)
SAO2 % BLDA: 92 % (ref 92–99)
SAO2 % BLDA: 93 % (ref 92–99)
SAO2 % BLDA: 95 % (ref 92–99)
SODIUM SERPL-SCNC: 138 MMOL/L (ref 136–145)
WBC # BLD AUTO: 3.6 X10^3/UL (ref 4–11)

## 2019-03-01 RX ADMIN — IPRATROPIUM BROMIDE AND ALBUTEROL SULFATE SCH ML: .5; 3 SOLUTION RESPIRATORY (INHALATION) at 11:39

## 2019-03-01 RX ADMIN — CLOPIDOGREL BISULFATE SCH MG: 75 TABLET ORAL at 10:16

## 2019-03-01 RX ADMIN — METHYLPREDNISOLONE SODIUM SUCCINATE SCH MG: 40 INJECTION, POWDER, FOR SOLUTION INTRAMUSCULAR; INTRAVENOUS at 22:01

## 2019-03-01 RX ADMIN — SIMVASTATIN SCH MG: 40 TABLET, FILM COATED ORAL at 22:01

## 2019-03-01 RX ADMIN — ASPIRIN SCH MG: 81 TABLET, COATED ORAL at 10:16

## 2019-03-01 RX ADMIN — IPRATROPIUM BROMIDE AND ALBUTEROL SULFATE SCH ML: .5; 3 SOLUTION RESPIRATORY (INHALATION) at 15:42

## 2019-03-01 RX ADMIN — BACITRACIN SCH MLS/HR: 5000 INJECTION, POWDER, FOR SOLUTION INTRAMUSCULAR at 15:14

## 2019-03-01 RX ADMIN — PANTOPRAZOLE SODIUM SCH MG: 40 TABLET, DELAYED RELEASE ORAL at 10:16

## 2019-03-01 RX ADMIN — NITROGLYCERIN SCH INCH: 20 OINTMENT TOPICAL at 09:00

## 2019-03-01 RX ADMIN — IPRATROPIUM BROMIDE AND ALBUTEROL SULFATE SCH ML: .5; 3 SOLUTION RESPIRATORY (INHALATION) at 19:36

## 2019-03-01 RX ADMIN — NITROGLYCERIN SCH INCH: 20 OINTMENT TOPICAL at 14:00

## 2019-03-01 RX ADMIN — METHYLPREDNISOLONE SODIUM SUCCINATE SCH MG: 40 INJECTION, POWDER, FOR SOLUTION INTRAMUSCULAR; INTRAVENOUS at 10:17

## 2019-03-01 RX ADMIN — NITROGLYCERIN SCH INCH: 20 OINTMENT TOPICAL at 22:02

## 2019-03-01 RX ADMIN — BACITRACIN SCH MLS/HR: 5000 INJECTION, POWDER, FOR SOLUTION INTRAMUSCULAR at 08:14

## 2019-03-01 NOTE — EKG
[facility]

              [address] [place] [address]

Test Date:    2019               Test Time:    22:00:36

Pat Name:     MARGOT REHMAN            Department:   

Patient ID:   [id number]           Room:         106 1

Gender:       M                        Technician:   

:          [id number]               Requested By: SILVIA NGUYỄN

Order Number: [id number]           Reading MD:   Cj Duran MD

                                 Measurements

Intervals                              Axis          

Rate:         103                      P:            66

ND:           116                      QRS:          78

QRSD:         118                      T:            36

QT:           298                                    

QTc:          392                                    

                           Interpretive Statements

SINUS TACHYCARDIA

RBBB



Electronically Signed On 3-5-2019 7:58:58 CST by Cj Duran MD

## 2019-03-01 NOTE — RAD
EXAM: CT chest with contrast - pulmonary embolus protocol

 

CLINICAL HISTORY: Shortness of air

 

COMPARISON: 6/19/2018, 2/2/18

 

TECHNIQUE: CT of the chest following the administration of intravenous 

contrast during the pulmonary arterial phase. Axial, coronal and sagittal 

reformatted images were generated including MIP images.

 

---PQRS compliance statement - One or more of the following individualized

dose reduction techniques were utilized for this study:

1.  Automated exposure control

2.  Adjustment of the mA and/or kV according to patient size

3.  Use of iterative reconstruction technique---

 

FINDINGS:

CHEST: 

Diagnostic quality: Suboptimal. 

Pulmonary emboli:  No pulmonary emboli to the level of the subsegmental 

branches. More peripheral vessels are not well assessed.

Right heart strain: None

Pulmonary arteries: Normal in caliber.

 

The heart is not enlarged. No pericardial effusion. Coronary artery 

calcifications are seen.

 

Prominent and borderline enlarged mediastinal and hilar lymph nodes are 

seen. For example in AP window lymph node measures 2 x 1.5 cm a subcarinal

lymph node measures 3.4 x 1.8 cm. No axillary lymphadenopathy.

 

No pleural effusion or pneumothorax. 

 

Emphysematous changes are seen bilaterally. Nodular opacities are seen in 

the lower lobes bilaterally with tree-in-bud opacities. Compared to prior 

CT 6/19/2018, these opacities are new/progressed.

 

Visualized Upper abdomen:  Bilateral renal hypodense lesions are seen, 

evaluation limited given extensive streak artifact.

 

Bones: Degenerative changes of the spine are seen

 

IMPRESSION:

1.  No pulmonary emboli to the level of the subsegmental branches. More 

peripheral vessels are not well assessed.

2.  Nodular opacities in the lower lobes with tree-in-bud opacities, new 

compared to 6/19/2018, possibly infectious/inflammatory in nature. 

Follow-up to resolution is recommended.

3.  Bilateral emphysematous changes are seen.

4.  Enlarged mediastinal and hilar lymph nodes are seen, possibly 

reactive.

 

Electronically signed by: Matthew Hardy MD (3/1/2019 1:14 AM) [place] [id number]

## 2019-03-01 NOTE — CARD
MR#: [id number]

Account#: [id number]

Accession#: [id number]

Date of Study: 03/01/2019

Ordering Physician: JARAD JONES, 

Referring Physician: GUS CHERY, 

Tech: Anika Nix





--------------- APPROVED REPORT --------------





EXAM: Two-dimensional and M-mode echocardiogram with Doppler and color Doppler.



Other Information 

Quality : FairHR: 74bpm



INDICATION

CAD 



2D DIMENSIONS 

RVDd3.6 (2.9-3.5cm)Left Atrium(2D)2.8 (1.6-4.0cm)

IVSd1.5 (0.7-1.1cm)Aortic Root(2D)3.1 (2.0-3.7cm)

LVDd4.6 (3.9-5.9cm)LVOT Diameter2.4 (1.8-2.4cm)

PWd1.3 (0.7-1.1cm)



Aortic Valve

AoV Peak Seth.124.4cm/sAoV VTI21.4cm

AO Peak GR.6.2mmHgLVOT  VTI 13.16cm

AO Mean GR.4mmHg



Mitral Valve

MV E Xohtsqwq26.3cm/sMV DECEL [id number]

MV A Iclajxwb54.6cm/sE/A  Ratio0.8



TDI

Lateral E' P. V6.75cm/sMedial E' P. V4.69cm/s

E/Lateral E'9.5E/Medial E'13.7



Tricuspid Valve

TR P. Lhvzszhh114gg/sTR Peak Gr.28mmHg



Pulmonary Vein

S1 Vqtgtwik57.2cm/sS2 Kbxkanat57.51cm/s

D2 Htduyseg69.5cm/sPVa fmizjxbj28lqle



 LEFT VENTRICLE 

The left ventricle is normal size. There is mild concentric left ventricular hypertrophy. The left ve
ntricular systolic function is normal and the ejection fraction is within normal range. The Ejection 
Fraction is 50-55%. There is normal LV segmental wall motion. Transmitral Doppler flow pattern is Gra
de I-abnormal relaxation pattern.



 RIGHT VENTRICLE 

The right ventricle is borderline dilated. There is normal right ventricular wall thickness. The righ
t ventricular systolic function is normal.



 ATRIA 

The left atrium is borderline dilated. The right atrium size is normal. The interatrial septum is int
act with no evidence for an atrial septal defect or patent foramen ovale as noted on 2-D or Doppler i
maging.



 AORTIC VALVE 

The aortic valve is normal in structure and function. Doppler and Color Flow revealed no significant 
aortic regurgitation. There is no significant aortic valvular stenosis.



 MITRAL VALVE 

The mitral valve is normal in structure and function. There is no evidence of mitral valve prolapse. 
Doppler and Color Flow revealed trace mitral valve regurgitation.



 TRICUSPID VALVE 

The tricuspid valve is normal in structure and function. Doppler and Color Flow revealed no tricuspid
 valve regurgitation noted. There is no tricuspid valve stenosis.



 PULMONIC VALVE 

The pulmonic valve is not well visualized. Doppler and Color Flow revealed no pulmonic valvular regur
gitation.



 GREAT VESSELS 

The aortic root is normal in size. The IVC is normal in size and collapses >50% with inspiration.



 PERICARDIAL EFFUSION 

There is no evidence of significant pericardial effusion.



Critical Notification

Critical Value: No



<Conclusion>

The left ventricle is normal size.

The left ventricular systolic function is normal and the ejection fraction is within normal range.

The Ejection Fraction is 50-55%.

There is mild concentric left ventricular hypertrophy.

There is no significant aortic valvular stenosis.

Doppler and Color Flow revealed no significant aortic regurgitation.

Doppler and Color Flow revealed trace mitral valve regurgitation.

Doppler and Color Flow revealed no tricuspid valve regurgitation noted.



Signed by : Abbe Liu MD

Electronically Approved : 03/01/2019 13:36:35

## 2019-03-01 NOTE — HP
ADMIT DATE:  02/28/2019



CHIEF COMPLAINT:  Respiratory failure.



HISTORY OF PRESENT ILLNESS:  A 68-year-old white male with known severe COPD on

oxygen at home, also has a history of peripheral arterial disease.  He

apparently saw his pulmonologist at [facility] recently, prescribed

prednisone and Levaquin, but he has not filled those yet per history.  He came

in with shortness of breath and fatigue and drowsiness and pCO2 was high and he

was in respiratory acidosis, pH 7.22.  Chest x-ray was clear and CTA showed no

sign of any pulmonary embolus.  He is on BiPAP and not able to provide history

at this time.



PAST MEDICAL HISTORY:  He has had peripheral arterial stents in both his legs. 

To my knowledge, he has never had coronary artery disease or stents in his heart

or bypass surgery, longstanding COPD.



ALLERGIES:  PENICILLIN.



MEDICATIONS:  Takes a statin and losartan HCT for hypertension.



SOCIAL HISTORY:  Still smokes and is in a smoking environment as well at home. 

Nondrinker.  Unremarkable otherwise.



FAMILY HISTORY:  Unknown.



REVIEW OF SYSTEMS:  No other complaints.



OBJECTIVE:

ENT:  BiPAP is in place.  No obvious abnormalities.  No jaundice is seen.

NECK:  No carotid bruits or nodes.

LUNGS:  Decreased breath sounds, fine expiratory wheezes.

CARDIOVASCULAR:  Regular rate.  Heart tones about 100.

ABDOMEN:  Obese, soft, benign and nontender.

EXTREMITIES:  Decreased pedal pulses, 2+ clubbing.

SKIN:  Turgor mildly decreased.  No sign of any bruising or bleeding.

NEUROLOGIC:  Physiologic nonfocal.



ASSESSMENT:  Acute-on-chronic respiratory failure secondary to exacerbation of

chronic obstructive pulmonary disease.  High troponin levels could be demand

ischemia or possibly coexisting non-ST elevated myocardial infarction.



PLAN:  Continue BiPAP, trying to avoid intubation.  Steroids, respiratory

treatments and he is on IV heparin per the ER because of the possibility of the

NSTEMI.  Platelet count is noted to be low today, so we will follow that to rule

out HIT.  Hold losartan HCT _____ hypotension.

 



______________________________

GUS CHERY MD



DR:  CARMENCITA/skylar  JOB#:  [id number] / [id number]

DD:  03/01/2019 09:07  DT:  03/01/2019 10:14

## 2019-03-01 NOTE — CONS
DATE OF CONSULTATION:  03/01/2019



ATTENDING PHYSICIAN:  GUS CHERY M.D.



CONSULTING PHYSICIAN:  Ernestina Miguel M.D.



REASON FOR CONSULTATION:  The patient is seen in Pulmonary consultation at the

request of Dr. Chery for acute-on-chronic respiratory failure, requiring

noninvasive ventilation.



HISTORY OF PRESENT ILLNESS:  The patient is a 68-year-old that presented to the

Emergency Department with shortness of air.  He is currently on BiPAP in the

Intensive Care Unit.  His initial blood gas revealed a pH of 7.22, PaCO2 of 92

and pO2 of 65.  This morning blood gases, his pCO2 was down to 81.  His pH was

basically about the same.



I did take the patient off the BiPAP, even though he has been on oxygen

supplementation for approximately 3 months.  He alleges that he quit tobacco 3

months ago.



He sees a pulmonologist at [facility].  Apparently, he was seen by

him and placed on oxygen along with some antibiotics and steroids, but never got

the prescriptions filled.



The patient denies fever, chills or night sweats.  No hemoptysis.



He had a CT chest that I reviewed.  There is no evidence of pulmonary emboli. 

There are bilateral nodular type of infiltrates, compatible with an infectious

etiologies.  He has got bilateral emphysematous changes along with enlarged

mediastinal hilar adenopathy.



PAST MEDICAL HISTORY:

1.  Chronic respiratory failure, 3 liters of oxygen.

2.  COPD, suspect severe.

3.  Tobacco dependence, in remission, quit 3 months ago.

4.  History of DVT, CVA.

5.  Coronary artery disease.

6.  Hyperlipidemia.

7.  Hypertension.



PAST SURGICAL HISTORY:  Status post cholecystectomy, left femoral bypass

surgery.



ALLERGIES:  LISTED TO PENICILLIN AND CODEINE.



SOCIAL HISTORY:  He apparently quit tobacco approximately 3 months ago.



FAMILY HISTORY:  Unknown.



REVIEW OF SYSTEMS:  Unobtainable secondary to the patient's condition.



PHYSICAL EXAMINATION:

GENERAL:  On examination, the patient was seen in the Intensive Care Unit.

VITAL SIGNS:  Since admission, his T-max has been 100.0.

HEENT:  Eyes, the sclerae were nonicteric.

NECK:  Jugular venous distention was not elevated.  No lymphadenopathy.

CHEST:  Full expansion.

LUNGS:  Crackles in the bases.  No wheezes.

CARDIOVASCULAR EXAMINATION:  Regular rate and rhythm with S1, S2.  No S3.

ABDOMEN:  Soft, nontender and nondistended.

EXTREMITIES:  No clubbing, cyanosis or edema.

NEUROLOGIC:  The patient was awake, alert, following commands.  A detailed neuro

exam was not performed.



LABORATORY DATA:  White count was low.  D-dimer was elevated.  Electrolytes were

noted.  BUN and creatinine were normal.  Upon admission, his albumin was low. 

The BNP was elevated.  Troponin was elevated.  Arterial blood gas as indicated

above.  CT as indicated above.  Repeat chest x-ray revealed no acute

infiltrates.



IMPRESSION:

1.  Acute-on-chronic respiratory failure, multifactorial.

2.  Abnormal CT chest, revealing nodular infiltrates in the bases, suspect

infection, possibly Pseudomonas in a patient with underlying chronic obstructive

pulmonary disease.

3.  Acute exacerbation of chronic obstructive pulmonary disease.

4.  Elevated troponin per Cardiology.

5.  CT angiogram revealing no evidence of pulmonary embolism.

6.  Multiple comorbidities, as indicated above.

7.  Tobacco dependence, in remission.

8.  Peripheral vascular disease.



PLAN:

1.  We will continue support with BiPAP.  Repeat arterial blood gas.

2.  Initiate Levaquin.

3.  Steroids.

4.  Follow clinical course and make recommendations.

5.  Repeat arterial blood gas.

6.  Avoid sedating medications.



Dr. Chery, I do appreciate the privilege in sharing in the patient's care.



Total cumulative critical care time of 45 minutes.

 



______________________________

ERNESTINA MIGUEL MD DR:  MABEL/skylar  JOB#:  [id number] / [id number]

DD:  03/01/2019 12:45  DT:  03/01/2019 23:52

## 2019-03-01 NOTE — RAD
CHEST AP ONLY

 

Clinical indications: congestion ,short of air 

 

COMPARISON: February 28, 2019.

 

Findings: No acute lung infiltrate or pleural effusion or pulmonary edema 

or lung mass or pneumothorax is seen.  The heart size, pulmonary 

vasculature, mediastinum and both nasir are unremarkable. 

 

Impression: No acute radiographic abnormality is seen.

 

Electronically signed by: Kyrie Pittman MD (3/1/2019 12:50 PM) [facility]

## 2019-03-01 NOTE — NUR
SS following for discharge planning. SS reviewed pt chart. Pt is from home with spouse and 
is currently requiring oxygen support. No other discharge needs noted at this time. SS will 
continue to follow for pending discharge needs.

## 2019-03-01 NOTE — PDOC2
CARDIAC CONSULT


DATE OF CONSULT


Date of Consult


DATE: 3/1/19 


TIME: 09:30





REASON FOR CONSULT


Reason for Consult:


NSTEMI





REFERRING PHYSICIAN


Referring Physician:


Vanesa





SOURCE


Source:  Chart review, Patient





HISTORY OF PRESENT ILLNESS


HISTORY OF PRESENT ILLNESS


This is a 67 yo male admitted for complains of SOA. He has been having SOA in 

the last week and worse in the last few days.  He has had multiple pneumonias 

in the past and denies any fever or chills but has been having frequent coughs 

but could not expectorate. He was noted with pneumonia again recently and has 

been taking antibiotics,  Despite this he still smokes tobacco.  No chest pain, 

nausea or vomiting.  He is known for mild CAD with recent LHC in 2/2018.  He 

feels tired and his appetite has been down.  Presently he could only answer my 

questions by nodding yes or no since he is on a Bipap and does not really want 

to talk at all and has had insomnia in the last few days. He uses O2 at home 

intermittently and does not use any CPAP.  No falls or any recent injury. No 

noted palpitations.





PAST MEDICAL HISTORY


Cardiovascular:  CAD, HTN, Hyperlipidemia, Other (PAD; carotid artery disease)


Pulmonary:  COPD, Pneumonia


CENTRAL NERVOUS SYSTEM:  CVA


Hepatobiliary:  No pertinent hx


Musculoskeletal:  Osteoarthritis


Infectious disease:  No pertinent hx


ENT:  No pertinent hx


Renal/:  No pertinent hx


Endocrine:  No pertinent hx





PAST SURGICAL HISTORY


Past Surgical History:  Arthroscopy (right elbow), Other (LHC; RLE arterial PTA/

stent; cervical and lumbar laminectomy; Left femoral bypass 2007)





FAMILY HISTORY


Family History:  Family History Unknown





SOCIAL HISTORY


Smoke:  <1 pack per day


ALCOHOL:  none


Drugs:  None


Lives:  with Family





CURRENT MEDICATIONS


CURRENT MEDICATIONS





Current Medications








 Medications


  (Trade)  Dose


 Ordered  Sig/Tin


 Route


 PRN Reason  Start Time


 Stop Time Status Last Admin


Dose Admin


 


 Sodium Chloride  1,000 ml @ 


 100 mls/hr  Q10H


 IV


   2/28/19 22:30


 3/1/19 08:29 DC 2/28/19 22:29





 


 Ipratropium


 Bromide


  (Atrovent)  0.5 mg  1X  ONCE


 NEB


   2/28/19 22:30


 2/28/19 22:31 DC 2/28/19 22:16





 


 Methylprednisolone


 Sodium Succinate


  (SOLU-Medrol


 125MG VIAL)  125 mg  1X  ONCE


 IV


   2/28/19 22:30


 2/28/19 22:31 DC 2/28/19 22:28





 


 Albuterol Sulfate


  (Ventolin Neb


 Soln)  10 mg  1X  ONCE


 CONT NEB


   2/28/19 22:30


 2/28/19 22:31 DC 2/28/19 22:13





 


 Budesonide


  (Pulmicort)  1 mg  1X  ONCE


 NEB


   2/28/19 22:30


 2/28/19 22:31 DC 2/28/19 22:13





 


 Magnesium Sulfate  50 ml @ 25


 mls/hr  1X  ONCE


 IV


   2/28/19 22:30


 3/1/19 00:29 DC 2/28/19 22:32





 


 Heparin Sodium


  (Porcine)


  (Heparin Sodium)  4,000 unit  1X  ONCE


 IV


   2/28/19 23:30


 2/28/19 23:31 DC 2/28/19 23:39





 


 Heparin Sodium/


 Dextrose  500 ml @ 0


 mls/hr  CONT  PRN


 IV


 SEE I/O RECORD  2/28/19 23:15


    2/28/19 23:41





 


 Info


  (Anti-Coagulation


 Monitoring By


 Pharmacy)  1 each  PRN DAILY  PRN


 [facility]


 SEE COMMENTS  2/28/19 23:15


    3/1/19 02:02





 


 Sodium Chloride  1,000 ml @ 


 100 mls/hr  Q10H


 IV


   2/28/19 23:30


 3/1/19 23:29  3/1/19 08:14





 


 Albuterol/


 Ipratropium


  (Duoneb)  3 ml  RTQID


 NEB


   3/1/19 08:00


 3/1/19 09:05 DC 3/1/19 08:24





 


 Iohexol


  (Omnipaque 350


 Mg/ml)  75 ml  1X  ONCE


 IV


   3/1/19 00:30


 3/1/19 00:31 DC 3/1/19 00:40














ALLERGIES


ALLERGIES:  


Coded Allergies:  


     Penicillins (Verified  Allergy, Severe, Shortness of Air, 1/15/14)


     codeine (Verified  Allergy, Severe, Shortness of Air, 1/15/14)





ROS


Review of System


Limited, pt drowsy and on bipap





PHYSICAL EXAM


General:  Oriented X3, Cooperative, No acute distress, Other (drowsy)


HEENT:  Atraumatic, Mucous membr. moist/pink


Lungs:  Other (diminished, biopap in place)


Abdomen:  Soft, No tenderness


Extremities:  No cyanosis, No edema


Skin:  No breakdown, No significant lesion


Neuro:  Normal speech, Other (drowsy)


MUSCULOSKELETAL:  Osteoarthritic changes both hands





VITALS


VITALS





Vital Signs








  Date Time  Temp Pulse Resp B/P (MAP) Pulse Ox O2 Delivery O2 Flow Rate FiO2


 


3/1/19 09:00  78 28 93/57 (69) 40 BiPAP/CPAP  


 


3/1/19 08:00 97.6       





 97.6       











LABS


Lab:





Laboratory Tests








Test


 2/28/19


22:05 2/28/19


22:24 2/28/19


23:51 3/1/19


02:00


 


White Blood Count


 5.0 x10^3/uL


(4.0-11.0) 


 


 





 


Red Blood Count


 5.22 x10^6/uL


(4.30-5.70) 


 


 





 


Hemoglobin


 14.8 g/dL


(13.0-17.5) 


 


 





 


Hematocrit


 46.0 %


(39.0-53.0) 


 


 





 


Mean Corpuscular Volume 88 fL ()    


 


Mean Corpuscular Hemoglobin 28 pg (25-35)    


 


Mean Corpuscular Hemoglobin


Concent 32 g/dL


(31-37) 


 


 





 


Red Cell Distribution Width


 16.2 %


(11.5-14.5) 


 


 





 


Platelet Count


 167 x10^3/uL


(140-400) 


 


 





 


Neutrophils (%) (Auto) 83 % (31-73)    


 


Lymphocytes (%) (Auto) 10 % (24-48)    


 


Monocytes (%) (Auto) 7 % (0-9)    


 


Eosinophils (%) (Auto) 0 % (0-3)    


 


Basophils (%) (Auto) 1 % (0-3)    


 


Neutrophils # (Auto)


 4.2 x10^3uL


(1.8-7.7) 


 


 





 


Lymphocytes # (Auto)


 0.5 x10^3/uL


(1.0-4.8) 


 


 





 


Monocytes # (Auto)


 0.3 x10^3/uL


(0.0-1.1) 


 


 





 


Eosinophils # (Auto)


 0.0 x10^3/uL


(0.0-0.7) 


 


 





 


Basophils # (Auto)


 0.0 x10^3/uL


(0.0-0.2) 


 


 





 


D-Dimer (Joanne)


 2.62 ug/mlFEU


(0.00-0.50) 


 


 





 


Sodium Level


 146 mmol/L


(136-145) 


 


 





 


Potassium Level


 4.1 mmol/L


(3.5-5.1) 


 


 





 


Chloride Level


 104 mmol/L


() 


 


 





 


Carbon Dioxide Level


 38 mmol/L


(21-32) 


 


 





 


Anion Gap 4 (6-14)    


 


Blood Urea Nitrogen


 29 mg/dL


(8-26) 


 


 





 


Creatinine


 1.0 mg/dL


(0.7-1.3) 


 


 





 


Estimated GFR


(Cockcroft-Gault) 74.1 


 


 


 





 


BUN/Creatinine Ratio 29 (6-20)    


 


Glucose Level


 103 mg/dL


(70-99) 


 


 





 


Lactic Acid Level


 1.4 mmol/L


(0.4-2.0) 


 


 





 


Calcium Level


 8.8 mg/dL


(8.5-10.1) 


 


 





 


Total Bilirubin


 0.3 mg/dL


(0.2-1.0) 


 


 





 


Aspartate Amino Transf


(AST/SGOT) 91 U/L (15-37) 


 


 


 





 


Alanine Aminotransferase


(ALT/SGPT) 51 U/L (16-63) 


 


 


 





 


Alkaline Phosphatase


 103 U/L


() 


 


 





 


Troponin I Quantitative


 0.692 ng/mL


(0.000-0.055) 


 


 0.504 ng/mL


(0.000-0.055)


 


NT-Pro-B-Type Natriuretic


Peptide 1923 pg/mL


(0-124) 


 


 





 


Total Protein


 7.9 g/dL


(6.4-8.2) 


 


 





 


Albumin


 3.2 g/dL


(3.4-5.0) 


 


 





 


Albumin/Globulin Ratio 0.7 (1.0-1.7)    


 


O2 Saturation  92 % (92-99)  95 % (92-99)  


 


Arterial Blood pH


 


 7.22


(7.35-7.45) 7.18


(7.35-7.45) 





 


Arterial Blood pCO2 at


Patient Temp 


 92 mmHg


(35-46) 88 mmHg


(35-46) 





 


Arterial Blood pO2 at Patient


Temp 


 65 mmHg


() 81 mmHg


() 





 


Arterial Blood HCO3


 


 36 mmol/L


(21-28) 32 mmol/L


(21-28) 





 


Arterial Blood Base Excess


 


 5 mmol/L


(-3-3) 1 mmol/L


(-3-3) 





 


FiO2  50  50  


 


Test


 3/1/19


02:02 3/1/19


06:30 3/1/19


08:30 





 


O2 Saturation 90 % (92-99)   93 % (92-99)  


 


Arterial Blood pH


 7.21


(7.35-7.45) 


 7.21


(7.35-7.45) 





 


Arterial Blood pCO2 at


Patient Temp 83 mmHg


(35-46) 


 81 mmHg


(35-46) 





 


Arterial Blood pO2 at Patient


Temp 63 mmHg


() 


 73 mmHg


() 





 


Arterial Blood HCO3


 32 mmol/L


(21-28) 


 32 mmol/L


(21-28) 





 


Arterial Blood Base Excess


 2 mmol/L


(-3-3) 


 2 mmol/L


(-3-3) 





 


FiO2 40   40  


 


White Blood Count


 


 3.6 x10^3/uL


(4.0-11.0) 


 





 


Red Blood Count


 


 4.58 x10^6/uL


(4.30-5.70) 


 





 


Hemoglobin


 


 12.8 g/dL


(13.0-17.5) 


 





 


Hematocrit


 


 40.6 %


(39.0-53.0) 


 





 


Mean Corpuscular Volume  89 fL ()   


 


Mean Corpuscular Hemoglobin  28 pg (25-35)   


 


Mean Corpuscular Hemoglobin


Concent 


 31 g/dL


(31-37) 


 





 


Red Cell Distribution Width


 


 15.7 %


(11.5-14.5) 


 





 


Platelet Count


 


 114 x10^3/uL


(140-400) 


 





 


Neutrophils (%) (Auto)  92 % (31-73)   


 


Lymphocytes (%) (Auto)  5 % (24-48)   


 


Monocytes (%) (Auto)  3 % (0-9)   


 


Eosinophils (%) (Auto)  0 % (0-3)   


 


Basophils (%) (Auto)  0 % (0-3)   


 


Neutrophils # (Auto)


 


 3.3 x10^3uL


(1.8-7.7) 


 





 


Lymphocytes # (Auto)


 


 0.2 x10^3/uL


(1.0-4.8) 


 





 


Monocytes # (Auto)


 


 0.1 x10^3/uL


(0.0-1.1) 


 





 


Eosinophils # (Auto)


 


 0.0 x10^3/uL


(0.0-0.7) 


 





 


Basophils # (Auto)


 


 0.0 x10^3/uL


(0.0-0.2) 


 





 


Heparin Anti-Xa Act,


Unfractionated 


 0.18 IU/mL


(0.30-0.70) 


 





 


Troponin I Quantitative


 


 0.681 ng/mL


(0.000-0.055) 


 














HEART CATH


HEART CATH





Findings.


Hemodynamics. 


LV pressure of 132/14, aortic root pressure 130/76.


Coronaries.


Left main. The left main was a normal-size vessel with a distal 10% lesion.


Left anterior descending. The LAD was a moderate size vessel with normal 

distribution. It had a proximal 20% lesion and a mid 25% lesion.


Left circumflex. The left circumflex is a dominant vessel. It had a proximal 25

% lesion and a more distal 20% lesion.


Right coronary artery. The right coronary was a small nondominant vessel with a 

proximal 20% lesion.


Left ventriculogram. 


The left ventricle had normal size and systolic function. Ejection fraction was 

55%.


Aortogram with runoff. Reported under a separate report.








<Conclusion>


1. Mild three vessel coronary artery disease with no lesions greater than 30%.


2. Normal left ventricular systolic function.





DATE:     02/09/18 1254





ASSESSMENT/PLAN


ASSESSMENT/PLAN


1. Acute on chronic respiratory failure with  AECOPD with recently treated 

pneumonia and continued tobaccoism


2. Elevated troponin: peaked at 0.069, demand mediated type 2 due to hypoxia as 

above. Last EF at 55%


3. Mild Thrombocytopenia: likely reactive


4. CAD: mild CAD with C in 2/2018


5. Hypotension


6. HLP


7. Prerenal azotemia: poor intake recently





Recommendations


1. Continue with Bipap.  IVF bolus. DC heparin


2. Continue with home antiplatelets. 


3. Hold Home BP meds will consistently adequate


4. TTE today.











JARAD JONES Mar 1, 2019 09:55

## 2019-03-01 NOTE — NUR
Patient admitted to ICU room 106. Arrived via gurney on 3LNC accompanied by RT and RN. 
Patient alert and oriented x1. Patient very drowsy and does not answer questions at this 
time. No family present at this time. Admission information completed using report from ED 
RN. Patient returned to Bipap and cardiac monitor at this time. Pt oriented to room, call 
light/ TV remote and policies and procedures.

## 2019-03-02 VITALS — SYSTOLIC BLOOD PRESSURE: 133 MMHG | DIASTOLIC BLOOD PRESSURE: 78 MMHG

## 2019-03-02 VITALS — DIASTOLIC BLOOD PRESSURE: 72 MMHG | SYSTOLIC BLOOD PRESSURE: 146 MMHG

## 2019-03-02 VITALS — SYSTOLIC BLOOD PRESSURE: 135 MMHG | DIASTOLIC BLOOD PRESSURE: 94 MMHG

## 2019-03-02 VITALS — SYSTOLIC BLOOD PRESSURE: 130 MMHG | DIASTOLIC BLOOD PRESSURE: 87 MMHG

## 2019-03-02 VITALS — DIASTOLIC BLOOD PRESSURE: 84 MMHG | SYSTOLIC BLOOD PRESSURE: 141 MMHG

## 2019-03-02 VITALS — DIASTOLIC BLOOD PRESSURE: 78 MMHG | SYSTOLIC BLOOD PRESSURE: 125 MMHG

## 2019-03-02 VITALS — SYSTOLIC BLOOD PRESSURE: 107 MMHG | DIASTOLIC BLOOD PRESSURE: 61 MMHG

## 2019-03-02 VITALS — DIASTOLIC BLOOD PRESSURE: 74 MMHG | SYSTOLIC BLOOD PRESSURE: 148 MMHG

## 2019-03-02 VITALS — DIASTOLIC BLOOD PRESSURE: 91 MMHG | SYSTOLIC BLOOD PRESSURE: 137 MMHG

## 2019-03-02 VITALS — DIASTOLIC BLOOD PRESSURE: 70 MMHG | SYSTOLIC BLOOD PRESSURE: 121 MMHG

## 2019-03-02 VITALS — SYSTOLIC BLOOD PRESSURE: 170 MMHG | DIASTOLIC BLOOD PRESSURE: 96 MMHG

## 2019-03-02 VITALS — SYSTOLIC BLOOD PRESSURE: 112 MMHG | DIASTOLIC BLOOD PRESSURE: 65 MMHG

## 2019-03-02 VITALS — SYSTOLIC BLOOD PRESSURE: 188 MMHG | DIASTOLIC BLOOD PRESSURE: 87 MMHG

## 2019-03-02 VITALS — DIASTOLIC BLOOD PRESSURE: 71 MMHG | SYSTOLIC BLOOD PRESSURE: 112 MMHG

## 2019-03-02 VITALS — DIASTOLIC BLOOD PRESSURE: 91 MMHG | SYSTOLIC BLOOD PRESSURE: 142 MMHG

## 2019-03-02 VITALS — DIASTOLIC BLOOD PRESSURE: 103 MMHG | SYSTOLIC BLOOD PRESSURE: 170 MMHG

## 2019-03-02 VITALS — DIASTOLIC BLOOD PRESSURE: 89 MMHG | SYSTOLIC BLOOD PRESSURE: 157 MMHG

## 2019-03-02 VITALS — DIASTOLIC BLOOD PRESSURE: 70 MMHG | SYSTOLIC BLOOD PRESSURE: 140 MMHG

## 2019-03-02 VITALS — DIASTOLIC BLOOD PRESSURE: 76 MMHG | SYSTOLIC BLOOD PRESSURE: 123 MMHG

## 2019-03-02 VITALS — SYSTOLIC BLOOD PRESSURE: 143 MMHG | DIASTOLIC BLOOD PRESSURE: 91 MMHG

## 2019-03-02 VITALS — SYSTOLIC BLOOD PRESSURE: 102 MMHG | DIASTOLIC BLOOD PRESSURE: 53 MMHG

## 2019-03-02 VITALS — DIASTOLIC BLOOD PRESSURE: 88 MMHG | SYSTOLIC BLOOD PRESSURE: 164 MMHG

## 2019-03-02 VITALS — DIASTOLIC BLOOD PRESSURE: 65 MMHG | SYSTOLIC BLOOD PRESSURE: 134 MMHG

## 2019-03-02 LAB
BASE EXCESS ABG: 3 MMOL/L (ref -3–3)
HCO3 BLDA-SCNC: 30 MMOL/L (ref 21–28)
INSPIRATION SETTING TIME VENT: 40
PCO2 BLDA: 56 MMHG (ref 35–46)
PO2 BLDA: 86 MMHG (ref 65–108)
SAO2 % BLDA: 96 % (ref 92–99)

## 2019-03-02 RX ADMIN — PANTOPRAZOLE SODIUM SCH MG: 40 TABLET, DELAYED RELEASE ORAL at 08:04

## 2019-03-02 RX ADMIN — IPRATROPIUM BROMIDE AND ALBUTEROL SULFATE SCH ML: .5; 3 SOLUTION RESPIRATORY (INHALATION) at 12:26

## 2019-03-02 RX ADMIN — ASPIRIN SCH MG: 81 TABLET, COATED ORAL at 08:04

## 2019-03-02 RX ADMIN — IPRATROPIUM BROMIDE AND ALBUTEROL SULFATE SCH ML: .5; 3 SOLUTION RESPIRATORY (INHALATION) at 08:14

## 2019-03-02 RX ADMIN — NITROGLYCERIN SCH INCH: 20 OINTMENT TOPICAL at 21:03

## 2019-03-02 RX ADMIN — IPRATROPIUM BROMIDE AND ALBUTEROL SULFATE SCH ML: .5; 3 SOLUTION RESPIRATORY (INHALATION) at 19:44

## 2019-03-02 RX ADMIN — IPRATROPIUM BROMIDE AND ALBUTEROL SULFATE SCH ML: .5; 3 SOLUTION RESPIRATORY (INHALATION) at 15:52

## 2019-03-02 RX ADMIN — SIMVASTATIN SCH MG: 40 TABLET, FILM COATED ORAL at 20:55

## 2019-03-02 RX ADMIN — METHYLPREDNISOLONE SODIUM SUCCINATE SCH MG: 40 INJECTION, POWDER, FOR SOLUTION INTRAMUSCULAR; INTRAVENOUS at 08:05

## 2019-03-02 RX ADMIN — NITROGLYCERIN SCH INCH: 20 OINTMENT TOPICAL at 06:19

## 2019-03-02 RX ADMIN — CLOPIDOGREL BISULFATE SCH MG: 75 TABLET ORAL at 08:04

## 2019-03-02 RX ADMIN — NITROGLYCERIN SCH INCH: 20 OINTMENT TOPICAL at 13:48

## 2019-03-02 RX ADMIN — METHYLPREDNISOLONE SODIUM SUCCINATE SCH MG: 40 INJECTION, POWDER, FOR SOLUTION INTRAMUSCULAR; INTRAVENOUS at 20:56

## 2019-03-02 RX ADMIN — Medication SCH CAP: at 20:55

## 2019-03-02 NOTE — EKG
[facility]

              [address] [place] [address]

Test Date:    2019               Test Time:    04:43:05

Pat Name:     MARGOT REHMAN            Department:   

Patient ID:   [id number]           Room:         106 1

Gender:       M                        Technician:   

:          [id number]               Requested By: GSU CHERY

Order Number: [id number]           Reading MD:   Cj Duran MD

                                 Measurements

Intervals                              Axis          

Rate:         52                       P:            90

CA:           120                      QRS:          78

QRSD:         120                      T:            54

QT:           434                                    

QTc:          406                                    

                           Interpretive Statements

SINUS RHYTHM

PAC'S

Electronically Signed On 3-5-2019 10:00:58 CST by Cj Duran MD

## 2019-03-02 NOTE — PDOC
PULMONARY PROGRESS NOTES


Subjective


PT AT TIMES SOA


NO INCREASE COUGH


Vitals





Vital Signs








  Date Time  Temp Pulse Resp B/P (MAP) Pulse Ox O2 Delivery O2 Flow Rate FiO2


 


3/2/19 14:00  75 20 112/65 (81)  Nasal Cannula 4.0 


 


3/2/19 13:00     40   


 


3/2/19 08:00 97.5       





 97.5       








ROS:  No Nausea, No Chest Pain, No Abdominal Pain, No Increase Cough


General:  Alert


Lungs:  Clear, Crackles


Cardiovascular:  S1


Abdomen:  Soft


Neuro Exam:  Alert


Extremities:  No Edema


Skin:  Warm


Labs





Laboratory Tests








Test


 2/28/19


22:05 2/28/19


22:24 2/28/19


23:51 3/1/19


00:45


 


White Blood Count


 5.0 x10^3/uL


(4.0-11.0) 


 


 





 


Red Blood Count


 5.22 x10^6/uL


(4.30-5.70) 


 


 





 


Hemoglobin


 14.8 g/dL


(13.0-17.5) 


 


 





 


Hematocrit


 46.0 %


(39.0-53.0) 


 


 





 


Mean Corpuscular Volume 88 fL ()    


 


Mean Corpuscular Hemoglobin 28 pg (25-35)    


 


Mean Corpuscular Hemoglobin


Concent 32 g/dL


(31-37) 


 


 





 


Red Cell Distribution Width


 16.2 %


(11.5-14.5) 


 


 





 


Platelet Count


 167 x10^3/uL


(140-400) 


 


 





 


Neutrophils (%) (Auto) 83 % (31-73)    


 


Lymphocytes (%) (Auto) 10 % (24-48)    


 


Monocytes (%) (Auto) 7 % (0-9)    


 


Eosinophils (%) (Auto) 0 % (0-3)    


 


Basophils (%) (Auto) 1 % (0-3)    


 


Neutrophils # (Auto)


 4.2 x10^3uL


(1.8-7.7) 


 


 





 


Lymphocytes # (Auto)


 0.5 x10^3/uL


(1.0-4.8) 


 


 





 


Monocytes # (Auto)


 0.3 x10^3/uL


(0.0-1.1) 


 


 





 


Eosinophils # (Auto)


 0.0 x10^3/uL


(0.0-0.7) 


 


 





 


Basophils # (Auto)


 0.0 x10^3/uL


(0.0-0.2) 


 


 





 


D-Dimer (Joanne)


 2.62 ug/mlFEU


(0.00-0.50) 


 


 





 


Sodium Level


 146 mmol/L


(136-145) 


 


 





 


Potassium Level


 4.1 mmol/L


(3.5-5.1) 


 


 





 


Chloride Level


 104 mmol/L


() 


 


 





 


Carbon Dioxide Level


 38 mmol/L


(21-32) 


 


 





 


Anion Gap 4 (6-14)    


 


Blood Urea Nitrogen


 29 mg/dL


(8-26) 


 


 





 


Creatinine


 1.0 mg/dL


(0.7-1.3) 


 


 





 


Estimated GFR


(Cockcroft-Gault) 74.1 


 


 


 





 


BUN/Creatinine Ratio 29 (6-20)    


 


Glucose Level


 103 mg/dL


(70-99) 


 


 





 


Lactic Acid Level


 1.4 mmol/L


(0.4-2.0) 


 


 





 


Calcium Level


 8.8 mg/dL


(8.5-10.1) 


 


 





 


Total Bilirubin


 0.3 mg/dL


(0.2-1.0) 


 


 





 


Aspartate Amino Transf


(AST/SGOT) 91 U/L (15-37) 


 


 


 





 


Alanine Aminotransferase


(ALT/SGPT) 51 U/L (16-63) 


 


 


 





 


Alkaline Phosphatase


 103 U/L


() 


 


 





 


Troponin I Quantitative


 0.692 ng/mL


(0.000-0.055) 


 


 





 


NT-Pro-B-Type Natriuretic


Peptide 1923 pg/mL


(0-124) 


 


 





 


Total Protein


 7.9 g/dL


(6.4-8.2) 


 


 





 


Albumin


 3.2 g/dL


(3.4-5.0) 


 


 





 


Albumin/Globulin Ratio 0.7 (1.0-1.7)    


 


O2 Saturation  92 % (92-99)  95 % (92-99)  


 


Arterial Blood pH


 


 7.22


(7.35-7.45) 7.18


(7.35-7.45) 





 


Arterial Blood pCO2 at


Patient Temp 


 92 mmHg


(35-46) 88 mmHg


(35-46) 





 


Arterial Blood pO2 at Patient


Temp 


 65 mmHg


() 81 mmHg


() 





 


Arterial Blood HCO3


 


 36 mmol/L


(21-28) 32 mmol/L


(21-28) 





 


Arterial Blood Base Excess


 


 5 mmol/L


(-3-3) 1 mmol/L


(-3-3) 





 


FiO2  50  50  


 


Nasal Screen MRSA (PCR)


 


 


 


 Negative


(Negative)


 


Test


 3/1/19


02:00 3/1/19


02:02 3/1/19


06:30 3/1/19


08:30


 


Troponin I Quantitative


 0.504 ng/mL


(0.000-0.055) 


 0.681 ng/mL


(0.000-0.055) 





 


O2 Saturation  90 % (92-99)   93 % (92-99) 


 


Arterial Blood pH


 


 7.21


(7.35-7.45) 


 7.21


(7.35-7.45)


 


Arterial Blood pCO2 at


Patient Temp 


 83 mmHg


(35-46) 


 81 mmHg


(35-46)


 


Arterial Blood pO2 at Patient


Temp 


 63 mmHg


() 


 73 mmHg


()


 


Arterial Blood HCO3


 


 32 mmol/L


(21-28) 


 32 mmol/L


(21-28)


 


Arterial Blood Base Excess


 


 2 mmol/L


(-3-3) 


 2 mmol/L


(-3-3)


 


FiO2  40   40 


 


White Blood Count


 


 


 3.6 x10^3/uL


(4.0-11.0) 





 


Red Blood Count


 


 


 4.58 x10^6/uL


(4.30-5.70) 





 


Hemoglobin


 


 


 12.8 g/dL


(13.0-17.5) 





 


Hematocrit


 


 


 40.6 %


(39.0-53.0) 





 


Mean Corpuscular Volume   89 fL ()  


 


Mean Corpuscular Hemoglobin   28 pg (25-35)  


 


Mean Corpuscular Hemoglobin


Concent 


 


 31 g/dL


(31-37) 





 


Red Cell Distribution Width


 


 


 15.7 %


(11.5-14.5) 





 


Platelet Count


 


 


 114 x10^3/uL


(140-400) 





 


Neutrophils (%) (Auto)   92 % (31-73)  


 


Lymphocytes (%) (Auto)   5 % (24-48)  


 


Monocytes (%) (Auto)   3 % (0-9)  


 


Eosinophils (%) (Auto)   0 % (0-3)  


 


Basophils (%) (Auto)   0 % (0-3)  


 


Neutrophils # (Auto)


 


 


 3.3 x10^3uL


(1.8-7.7) 





 


Lymphocytes # (Auto)


 


 


 0.2 x10^3/uL


(1.0-4.8) 





 


Monocytes # (Auto)


 


 


 0.1 x10^3/uL


(0.0-1.1) 





 


Eosinophils # (Auto)


 


 


 0.0 x10^3/uL


(0.0-0.7) 





 


Basophils # (Auto)


 


 


 0.0 x10^3/uL


(0.0-0.2) 





 


Segmented Neutrophils %   60 % (35-66)  


 


Band Neutrophils %   32 % (0-9)  


 


Lymphocytes %   5 % (24-48)  


 


Monocytes %   2 % (0-10)  


 


Metamyelocytes %   1 % (0-0)  


 


Nucleated Red Blood Cells   1  


 


Platelet Estimate


 


 


 Decreased


(ADEQUATE) 





 


Basophilic Stippling   Present  


 


Heparin Anti-Xa Act,


Unfractionated 


 


 0.18 IU/mL


(0.30-0.70) 





 


Sodium Level


 


 


 138 mmol/L


(136-145) 





 


Potassium Level


 


 


 4.5 mmol/L


(3.5-5.1) 





 


Chloride Level


 


 


 104 mmol/L


() 





 


Carbon Dioxide Level


 


 


 32 mmol/L


(21-32) 





 


Anion Gap   2 (6-14)  


 


Blood Urea Nitrogen


 


 


 30 mg/dL


(8-26) 





 


Creatinine


 


 


 0.9 mg/dL


(0.7-1.3) 





 


Estimated GFR


(Cockcroft-Gault) 


 


 83.7 


 





 


Glucose Level


 


 


 138 mg/dL


(70-99) 





 


Calcium Level


 


 


 8.3 mg/dL


(8.5-10.1) 





 


Magnesium Level


 


 


 2.4 mg/dL


(1.8-2.4) 





 


Test


 3/1/19


16:40 3/2/19


06:34 3/2/19


08:10 





 


O2 Saturation 92 % (92-99)   96 % (92-99)  


 


Arterial Blood pH


 7.32


(7.35-7.45) 


 7.34


(7.35-7.45) 





 


Arterial Blood pCO2 at


Patient Temp 63 mmHg


(35-46) 


 56 mmHg


(35-46) 





 


Arterial Blood pO2 at Patient


Temp 64 mmHg


() 


 86 mmHg


() 





 


Arterial Blood HCO3


 31 mmol/L


(21-28) 


 30 mmol/L


(21-28) 





 


Arterial Blood Base Excess


 4 mmol/L


(-3-3) 


 3 mmol/L


(-3-3) 





 


FiO2 40   40  


 


Platelet Count


 


 115 x10^3/uL


(140-400) 


 





 


Troponin I Quantitative


 


 0.150 ng/mL


(0.000-0.055) 


 











Laboratory Tests








Test


 3/1/19


16:40 3/2/19


06:34 3/2/19


08:10


 


O2 Saturation 92 % (92-99)   96 % (92-99) 


 


Arterial Blood pH


 7.32


(7.35-7.45) 


 7.34


(7.35-7.45)


 


Arterial Blood pCO2 at


Patient Temp 63 mmHg


(35-46) 


 56 mmHg


(35-46)


 


Arterial Blood pO2 at Patient


Temp 64 mmHg


() 


 86 mmHg


()


 


Arterial Blood HCO3


 31 mmol/L


(21-28) 


 30 mmol/L


(21-28)


 


Arterial Blood Base Excess


 4 mmol/L


(-3-3) 


 3 mmol/L


(-3-3)


 


FiO2 40   40 


 


Platelet Count


 


 115 x10^3/uL


(140-400) 





 


Troponin I Quantitative


 


 0.150 ng/mL


(0.000-0.055) 











Medications





Active Scripts








 Medications  Dose


 Route/Sig


 Max Daily Dose Days Date Category


 


 Ibuprofen 400 Mg


 Tablet  400 Mg


 PO PRN Q6HRS PRN


   2/9/18 Reported


 


 Duoneb 0.5-3(2.5)


 Mg/3 Ml


  (Albuterol/Ipratropium)


 3 Ml Ampul.neb  3 Ml


 NEB QID


   2/9/18 Reported


 


 Aspir-Low


  (Aspirin) 81 Mg


 Tablet.dr  1 Tab


 PO DAILY


   4/21/17 Reported


 


 Ventolin Hfa


 Inhaler


  (Albuterol


 Sulfate) 18 Gm


 Hfa.aer.ad  


 


   4/21/17 Reported


 


 Losartan-Hctz


 50-12.5 Mg Tab


  (Losartan/Hydrochlorothiazide)


 1 Each Tablet  1 Tab


 PO DAILY


   4/21/17 Reported


 


 Simvastatin 40 Mg


 Tablet  40 Mg


 PO HS


   6/4/15 Reported


 


 Clopidogrel


  (Clopidogrel


 Bisulfate) 75 Mg


 Tablet  75 Mg


 PO DAILY


   6/4/15 Reported











Impression


.


IMPRESSION:


1.  Acute-on-chronic respiratory failure, multifactorial.


2.  Abnormal CT chest, revealing nodular infiltrates in the bases, suspect


infection, possibly Pseudomonas in a patient with underlying chronic obstructive


pulmonary disease.


3.  Acute exacerbation of chronic obstructive pulmonary disease.


4.  Elevated troponin per Cardiology.


5.  CT angiogram revealing no evidence of pulmonary embolism.


6.  Multiple comorbidities, as indicated above.


7.  Tobacco dependence, in remission.


8.  Peripheral vascular disease.





Plan


.


CONTINUE THE SAME


PRN AND QHS BIPAP


AVOID SEDATING MED


1.  We will continue support with BiPAP.  Repeat arterial blood gas.NOTED BETTER


2.  Initiate Levaquin.


3.  Steroids.











ERNESTINA MIGUEL MD Mar 2, 2019 14:38

## 2019-03-02 NOTE — RAD
PORTABLE CHEST 1V

 

Clinical History: RF

 

Technique:  AP view of the chest was obtained at 3/2/2019 7:03 AM.

 

Comparison: March 1, 2019.

 

Findings:

 

The cardiomediastinal silhouette is normal. The pulmonary vasculature is 

normal. The lungs and pleural margins are clear.

 

Impression:  

 

No evidence of an acute cardiopulmonary process.

 

Electronically signed by: Parmjit Zhang III, MD (3/2/2019 8:19 AM) [facility]

## 2019-03-02 NOTE — PDOC
PROGRESS NOTES


Subjective


Subjective


Patient seen and examined





Objective


Objective





Vital Signs








  Date Time  Temp Pulse Resp B/P (MAP) Pulse Ox O2 Delivery O2 Flow Rate FiO2


 


3/2/19 11:00  60 18 157/89 (111) 40 BiPAP/CPAP  


 


3/2/19 10:00       4.0 


 


3/2/19 08:00 97.5       





 97.5       














Intake and Output 


 


 3/2/19





 07:00


 


Intake Total 3018.90 ml


 


Output Total 1125 ml


 


Balance 1893.90 ml


 


 


 


IV Total 3018.90 ml


 


Output Urine Total 1125 ml











Physical Exam


Abdomen:  Normal bowel sounds


Heart:  Regular rate


General:  mild distress


Lungs:  Other (decreased breath sounds)





Assessment


Assessment


Problems


Medical Problems:


(1) Acute respiratory failure with hypercapnia


Status: Acute  





(2) COPD with acute exacerbation


Status: Acute  





(3) Non-STEMI (non-ST elevated myocardial infarction)


Status: Acute  





Acute on chronic respiratory failure. Exacerbation of COPD, recent pneumonia, 

continued tobacco use. Improving. Will continue as per the pulmonary service. 


Minimally elevated troponin. Demand ischemia. Heart catheterization within the 

past year showed no lesions greater than 30%. Echo shows intact LV systolic 

function. Continue present treatment. 


Hypotension. Improving with IV fluids.


Hyperlipidemia. Continue present treatments.


Probable prerenal azotemia. Fluids and monitoring lab. Improved.





Comment


Review of Relevant


I have reviewed the following items stephane (where applicable) has been applied.


Labs





Laboratory Tests








Test


 2/28/19


22:05 2/28/19


22:24 2/28/19


23:51 3/1/19


00:45


 


White Blood Count


 5.0 x10^3/uL


(4.0-11.0) 


 


 





 


Red Blood Count


 5.22 x10^6/uL


(4.30-5.70) 


 


 





 


Hemoglobin


 14.8 g/dL


(13.0-17.5) 


 


 





 


Hematocrit


 46.0 %


(39.0-53.0) 


 


 





 


Mean Corpuscular Volume 88 fL ()    


 


Mean Corpuscular Hemoglobin 28 pg (25-35)    


 


Mean Corpuscular Hemoglobin


Concent 32 g/dL


(31-37) 


 


 





 


Red Cell Distribution Width


 16.2 %


(11.5-14.5) 


 


 





 


Platelet Count


 167 x10^3/uL


(140-400) 


 


 





 


Neutrophils (%) (Auto) 83 % (31-73)    


 


Lymphocytes (%) (Auto) 10 % (24-48)    


 


Monocytes (%) (Auto) 7 % (0-9)    


 


Eosinophils (%) (Auto) 0 % (0-3)    


 


Basophils (%) (Auto) 1 % (0-3)    


 


Neutrophils # (Auto)


 4.2 x10^3uL


(1.8-7.7) 


 


 





 


Lymphocytes # (Auto)


 0.5 x10^3/uL


(1.0-4.8) 


 


 





 


Monocytes # (Auto)


 0.3 x10^3/uL


(0.0-1.1) 


 


 





 


Eosinophils # (Auto)


 0.0 x10^3/uL


(0.0-0.7) 


 


 





 


Basophils # (Auto)


 0.0 x10^3/uL


(0.0-0.2) 


 


 





 


D-Dimer (Joanne)


 2.62 ug/mlFEU


(0.00-0.50) 


 


 





 


Sodium Level


 146 mmol/L


(136-145) 


 


 





 


Potassium Level


 4.1 mmol/L


(3.5-5.1) 


 


 





 


Chloride Level


 104 mmol/L


() 


 


 





 


Carbon Dioxide Level


 38 mmol/L


(21-32) 


 


 





 


Anion Gap 4 (6-14)    


 


Blood Urea Nitrogen


 29 mg/dL


(8-26) 


 


 





 


Creatinine


 1.0 mg/dL


(0.7-1.3) 


 


 





 


Estimated GFR


(Cockcroft-Gault) 74.1 


 


 


 





 


BUN/Creatinine Ratio 29 (6-20)    


 


Glucose Level


 103 mg/dL


(70-99) 


 


 





 


Lactic Acid Level


 1.4 mmol/L


(0.4-2.0) 


 


 





 


Calcium Level


 8.8 mg/dL


(8.5-10.1) 


 


 





 


Total Bilirubin


 0.3 mg/dL


(0.2-1.0) 


 


 





 


Aspartate Amino Transf


(AST/SGOT) 91 U/L (15-37) 


 


 


 





 


Alanine Aminotransferase


(ALT/SGPT) 51 U/L (16-63) 


 


 


 





 


Alkaline Phosphatase


 103 U/L


() 


 


 





 


Troponin I Quantitative


 0.692 ng/mL


(0.000-0.055) 


 


 





 


NT-Pro-B-Type Natriuretic


Peptide 1923 pg/mL


(0-124) 


 


 





 


Total Protein


 7.9 g/dL


(6.4-8.2) 


 


 





 


Albumin


 3.2 g/dL


(3.4-5.0) 


 


 





 


Albumin/Globulin Ratio 0.7 (1.0-1.7)    


 


O2 Saturation  92 % (92-99)  95 % (92-99)  


 


Arterial Blood pH


 


 7.22


(7.35-7.45) 7.18


(7.35-7.45) 





 


Arterial Blood pCO2 at


Patient Temp 


 92 mmHg


(35-46) 88 mmHg


(35-46) 





 


Arterial Blood pO2 at Patient


Temp 


 65 mmHg


() 81 mmHg


() 





 


Arterial Blood HCO3


 


 36 mmol/L


(21-28) 32 mmol/L


(21-28) 





 


Arterial Blood Base Excess


 


 5 mmol/L


(-3-3) 1 mmol/L


(-3-3) 





 


FiO2  50  50  


 


Nasal Screen MRSA (PCR)


 


 


 


 Negative


(Negative)


 


Test


 3/1/19


02:00 3/1/19


02:02 3/1/19


06:30 3/1/19


08:30


 


Troponin I Quantitative


 0.504 ng/mL


(0.000-0.055) 


 0.681 ng/mL


(0.000-0.055) 





 


O2 Saturation  90 % (92-99)   93 % (92-99) 


 


Arterial Blood pH


 


 7.21


(7.35-7.45) 


 7.21


(7.35-7.45)


 


Arterial Blood pCO2 at


Patient Temp 


 83 mmHg


(35-46) 


 81 mmHg


(35-46)


 


Arterial Blood pO2 at Patient


Temp 


 63 mmHg


() 


 73 mmHg


()


 


Arterial Blood HCO3


 


 32 mmol/L


(21-28) 


 32 mmol/L


(21-28)


 


Arterial Blood Base Excess


 


 2 mmol/L


(-3-3) 


 2 mmol/L


(-3-3)


 


FiO2  40   40 


 


White Blood Count


 


 


 3.6 x10^3/uL


(4.0-11.0) 





 


Red Blood Count


 


 


 4.58 x10^6/uL


(4.30-5.70) 





 


Hemoglobin


 


 


 12.8 g/dL


(13.0-17.5) 





 


Hematocrit


 


 


 40.6 %


(39.0-53.0) 





 


Mean Corpuscular Volume   89 fL ()  


 


Mean Corpuscular Hemoglobin   28 pg (25-35)  


 


Mean Corpuscular Hemoglobin


Concent 


 


 31 g/dL


(31-37) 





 


Red Cell Distribution Width


 


 


 15.7 %


(11.5-14.5) 





 


Platelet Count


 


 


 114 x10^3/uL


(140-400) 





 


Neutrophils (%) (Auto)   92 % (31-73)  


 


Lymphocytes (%) (Auto)   5 % (24-48)  


 


Monocytes (%) (Auto)   3 % (0-9)  


 


Eosinophils (%) (Auto)   0 % (0-3)  


 


Basophils (%) (Auto)   0 % (0-3)  


 


Neutrophils # (Auto)


 


 


 3.3 x10^3uL


(1.8-7.7) 





 


Lymphocytes # (Auto)


 


 


 0.2 x10^3/uL


(1.0-4.8) 





 


Monocytes # (Auto)


 


 


 0.1 x10^3/uL


(0.0-1.1) 





 


Eosinophils # (Auto)


 


 


 0.0 x10^3/uL


(0.0-0.7) 





 


Basophils # (Auto)


 


 


 0.0 x10^3/uL


(0.0-0.2) 





 


Segmented Neutrophils %   60 % (35-66)  


 


Band Neutrophils %   32 % (0-9)  


 


Lymphocytes %   5 % (24-48)  


 


Monocytes %   2 % (0-10)  


 


Metamyelocytes %   1 % (0-0)  


 


Nucleated Red Blood Cells   1  


 


Platelet Estimate


 


 


 Decreased


(ADEQUATE) 





 


Basophilic Stippling   Present  


 


Heparin Anti-Xa Act,


Unfractionated 


 


 0.18 IU/mL


(0.30-0.70) 





 


Sodium Level


 


 


 138 mmol/L


(136-145) 





 


Potassium Level


 


 


 4.5 mmol/L


(3.5-5.1) 





 


Chloride Level


 


 


 104 mmol/L


() 





 


Carbon Dioxide Level


 


 


 32 mmol/L


(21-32) 





 


Anion Gap   2 (6-14)  


 


Blood Urea Nitrogen


 


 


 30 mg/dL


(8-26) 





 


Creatinine


 


 


 0.9 mg/dL


(0.7-1.3) 





 


Estimated GFR


(Cockcroft-Gault) 


 


 83.7 


 





 


Glucose Level


 


 


 138 mg/dL


(70-99) 





 


Calcium Level


 


 


 8.3 mg/dL


(8.5-10.1) 





 


Magnesium Level


 


 


 2.4 mg/dL


(1.8-2.4) 





 


Test


 3/1/19


16:40 3/2/19


06:34 3/2/19


08:10 





 


O2 Saturation 92 % (92-99)   96 % (92-99)  


 


Arterial Blood pH


 7.32


(7.35-7.45) 


 7.34


(7.35-7.45) 





 


Arterial Blood pCO2 at


Patient Temp 63 mmHg


(35-46) 


 56 mmHg


(35-46) 





 


Arterial Blood pO2 at Patient


Temp 64 mmHg


() 


 86 mmHg


() 





 


Arterial Blood HCO3


 31 mmol/L


(21-28) 


 30 mmol/L


(21-28) 





 


Arterial Blood Base Excess


 4 mmol/L


(-3-3) 


 3 mmol/L


(-3-3) 





 


FiO2 40   40  


 


Platelet Count


 


 115 x10^3/uL


(140-400) 


 





 


Troponin I Quantitative


 


 0.150 ng/mL


(0.000-0.055) 


 











Laboratory Tests








Test


 3/1/19


16:40 3/2/19


06:34 3/2/19


08:10


 


O2 Saturation 92 % (92-99)   96 % (92-99) 


 


Arterial Blood pH


 7.32


(7.35-7.45) 


 7.34


(7.35-7.45)


 


Arterial Blood pCO2 at


Patient Temp 63 mmHg


(35-46) 


 56 mmHg


(35-46)


 


Arterial Blood pO2 at Patient


Temp 64 mmHg


() 


 86 mmHg


()


 


Arterial Blood HCO3


 31 mmol/L


(21-28) 


 30 mmol/L


(21-28)


 


Arterial Blood Base Excess


 4 mmol/L


(-3-3) 


 3 mmol/L


(-3-3)


 


FiO2 40   40 


 


Platelet Count


 


 115 x10^3/uL


(140-400) 





 


Troponin I Quantitative


 


 0.150 ng/mL


(0.000-0.055) 











Microbiology


2/28/19 Blood Culture - Preliminary, Resulted


          NO GROWTH AFTER 1 DAY


Medications





Current Medications


Albuterol Sulfate (Ventolin Neb Soln) 2.5 mg STK-MED ONCE .ROUTE ;  Start 2/28/ 19 at 22:03;  Stop 2/28/19 at 22:04;  Status DC


Sodium Chloride 1,000 ml @  100 mls/hr Q10H IV  Last administered on 2/28/19at 

22:29;  Start 2/28/19 at 22:30;  Stop 3/1/19 at 08:29;  Status DC


Ipratropium Bromide (Atrovent) 0.5 mg 1X  ONCE NEB  Last administered on 2/28/ 19at 22:16;  Start 2/28/19 at 22:30;  Stop 2/28/19 at 22:31;  Status DC


Methylprednisolone Sodium Succinate (SOLU-Medrol 125MG VIAL) 125 mg 1X  ONCE IV

  Last administered on 2/28/19at 22:28;  Start 2/28/19 at 22:30;  Stop 2/28/19 

at 22:31;  Status DC


Albuterol Sulfate (Ventolin Neb Soln) 10 mg 1X  ONCE CONT NEB  Last 

administered on 2/28/19at 22:13;  Start 2/28/19 at 22:30;  Stop 2/28/19 at 22:31

;  Status DC


Budesonide (Pulmicort) 1 mg 1X  ONCE NEB  Last administered on 2/28/19at 22:13;

  Start 2/28/19 at 22:30;  Stop 2/28/19 at 22:31;  Status DC


Magnesium Sulfate 50 ml @ 25 mls/hr 1X  ONCE IV  Last administered on 2/28/19at 

22:32;  Start 2/28/19 at 22:30;  Stop 3/1/19 at 00:29;  Status DC


Budesonide (Pulmicort) 0.5 mg STK-MED ONCE NEB ;  Start 2/28/19 at 22:03;  Stop 

2/28/19 at 22:04;  Status DC


Heparin Sodium (Porcine) (Heparin Sodium) 4,000 unit 1X  ONCE IV  Last 

administered on 2/28/19at 23:39;  Start 2/28/19 at 23:30;  Stop 3/1/19 at 09:50

;  Status DC


Heparin Sodium/ Dextrose 500 ml @ 0 mls/hr CONT  PRN IV SEE I/O RECORD Last 

administered on 2/28/19at 23:41;  Start 2/28/19 at 23:15;  Stop 3/1/19 at 09:50

;  Status DC


Heparin Sodium (Porcine) (Heparin Sodium) 1,950 unit PRN Q6HRS  PRN IV FOR UFH 

LEVEL LESS THAN 0.2;  Start 2/28/19 at 23:15;  Stop 3/1/19 at 09:50;  Status DC


Info (Anti-Coagulation Monitoring By Pharmacy) 1 each PRN DAILY  PRN MC SEE 

COMMENTS Last administered on 3/1/19at 02:02;  Start 2/28/19 at 23:15;  Stop 3/1

/19 at 13:01;  Status DC


Ondansetron HCl (Zofran) 4 mg PRN Q8HRS  PRN IV NAUSEA/VOMITING 1ST CHOICE;  

Start 2/28/19 at 23:15;  Stop 3/1/19 at 23:14;  Status DC


Morphine Sulfate (Morphine Sulfate) 4 mg PRN Q2HR  PRN IV SEVERE PAIN;  Start 2/ 28/19 at 23:15;  Stop 3/1/19 at 23:14;  Status DC


Sodium Chloride 1,000 ml @  100 mls/hr Q10H IV  Last administered on 3/1/19at 15

:14;  Start 2/28/19 at 23:30;  Stop 3/1/19 at 23:29;  Status DC


Acetaminophen (Tylenol) 650 mg PRN Q4HRS  PRN PO FEVER;  Start 2/28/19 at 23:15

;  Stop 3/1/19 at 23:14;  Status DC


Nitroglycerin (Nitrostat) 0.4 mg PRN Q5MIN  PRN SL CHEST PAIN;  Start 2/28/19 

at 23:15;  Stop 3/1/19 at 23:14;  Status DC


Albuterol/ Ipratropium (Duoneb) 3 ml RTQID NEB  Last administered on 3/1/19at 08

:24;  Start 3/1/19 at 08:00;  Stop 3/1/19 at 09:05;  Status DC


Iohexol (Omnipaque 350 Mg/ml) 75 ml 1X  ONCE IV  Last administered on 3/1/19at 

00:40;  Start 3/1/19 at 00:30;  Stop 3/1/19 at 00:31;  Status DC


Info (CONTRAST GIVEN -- Rx MONITORING) 1 each PRN DAILY  PRN MC SEE COMMENTS;  

Start 3/1/19 at 00:15;  Stop 3/3/19 at 00:14


Aspirin (Ecotrin) 81 mg DAILY PO  Last administered on 3/2/19at 08:04;  Start 3/

1/19 at 09:30


Clopidogrel Bisulfate (Plavix) 75 mg DAILY PO  Last administered on 3/2/19at 08:

04;  Start 3/1/19 at 09:30


Albuterol/ Ipratropium (Duoneb) 3 ml RTQID NEB  Last administered on 3/2/19at 12

:26;  Start 3/1/19 at 12:00


Non-Formulary Medication (Losartan/ Hydrochlorothiazide (Losartan-Hctz 50-12.5 

Mg Tab)) 1 tab DAILY PO ;  Start 3/1/19 at 09:00;  Stop 3/1/19 at 09:00;  

Status DC


Simvastatin (Zocor) 40 mg QHS PO  Last administered on 3/1/19at 22:01;  Start 3/

1/19 at 21:00


Methylprednisolone Sodium Succinate (SOLU-Medrol 40MG VIAL) 60 mg Q12HR IV  

Last administered on 3/2/19at 08:05;  Start 3/1/19 at 09:00


Pantoprazole Sodium (Protonix) 40 mg DAILYAC PO  Last administered on 3/2/19at 

08:04;  Start 3/1/19 at 09:30


Nitroglycerin (Nitro-Bid Oint) 0.5 inch Q8HRS TP  Last administered on 3/2/19at 

06:19;  Start 3/1/19 at 09:00


Sodium Chloride 500 ml @  500 mls/hr 1X  ONCE IV  Last administered on 3/1/19at 

10:00;  Start 3/1/19 at 10:00;  Stop 3/1/19 at 10:59;  Status DC


Levofloxacin/ Dextrose 100 ml @  100 mls/hr Q24H IV  Last administered on 3/1/

19at 13:19;  Start 3/1/19 at 13:00


Albuterol Sulfate (Ventolin Neb Soln) 2.5 mg 1X  ONCE NEB  Last administered on 

3/2/19at 01:00;  Start 3/2/19 at 01:00;  Stop 3/2/19 at 01:01;  Status DC


Lactobacillus Rhamnosus (Culturelle) 1 cap BID PO ;  Start 3/2/19 at 21:00





Active Scripts


Active


Reported


Ibuprofen 400 Mg Tablet 400 Mg PO PRN Q6HRS PRN


Duoneb 0.5-3(2.5) Mg/3 Ml (Albuterol/Ipratropium) 3 Ml Ampul.neb 3 Ml NEB QID


Aspir-Low (Aspirin) 81 Mg Tablet.dr 1 Tab PO DAILY


Ventolin Hfa Inhaler (Albuterol Sulfate) 18 Gm Hfa.aer.ad   


Losartan-Hctz 50-12.5 Mg Tab (Losartan/Hydrochlorothiazide) 1 Each Tablet 1 Tab 

PO DAILY


Simvastatin 40 Mg Tablet 40 Mg PO HS


Clopidogrel (Clopidogrel Bisulfate) 75 Mg Tablet 75 Mg PO DAILY


Vitals/I & O





Vital Sign - Last 24 Hours








 3/1/19 3/1/19 3/1/19 3/1/19





 13:00 13:25 14:00 14:00


 


Pulse 71  71 68


 


Resp 29  30 


 


B/P (MAP) 96/65 (75)  98/62 (74) 98/62


 


Pulse Ox 40 93 40 


 


O2 Delivery BiPAP/CPAP BiPAP/CPAP BiPAP/CPAP 





 3/1/19 3/1/19 3/1/19 3/1/19





 15:00 15:42 16:00 16:00


 


Temp 98.0  98.0 





 98.0  98.0 


 


Pulse 67  71 


 


Resp 23  24 


 


B/P (MAP) 127/79 (95)  128/77 (94) 


 


Pulse Ox 40 96 40 


 


O2 Delivery BiPAP/CPAP BiPAP/CPAP BiPAP/CPAP Bi-pap


 


    





    





 3/1/19 3/1/19 3/1/19 3/1/19





 17:00 18:00 19:00 19:36


 


Pulse 69 62 71 


 


Resp 17 18 25 


 


B/P (MAP) 114/77 (89) 130/75 (93) 115/81 (92) 


 


Pulse Ox 40 40 40 96


 


O2 Delivery BiPAP/CPAP BiPAP/CPAP BiPAP/CPAP BiPAP/CPAP





 3/1/19 3/1/19 3/1/19 3/1/19





 19:37 20:00 21:00 22:00


 


Temp  97.7  





  97.7  


 


Pulse  71 70 66


 


Resp  29 24 21


 


B/P (MAP)  99/67 (78) 105/63 (77) 109/69 (82)


 


Pulse Ox  40 40 40


 


O2 Delivery Bi-pap BiPAP/CPAP BiPAP/CPAP BiPAP/CPAP


 


    





    





 3/1/19 3/1/19 3/2/19 3/2/19





 22:02 23:00 00:00 00:15


 


Temp   97.1 





   97.1 


 


Pulse 67 68 72 


 


Resp  16 17 


 


B/P (MAP) 109/69 128/59 (82) 112/71 (85) 


 


O2 Delivery  Nasal Cannula Nasal Cannula Bi-pap


 


O2 Flow Rate  3.0 3.0 


 


    





    





 3/2/19 3/2/19 3/2/19 3/2/19





 01:00 01:00 02:00 03:00


 


Pulse 58  64 69


 


Resp 18  16 14


 


B/P (MAP) 133/78 (96)  121/70 (87) 123/76 (92)


 


Pulse Ox 40 98 40 40


 


O2 Delivery BiPAP/CPAP BiPAP/CPAP BiPAP/CPAP BiPAP/CPAP





 3/2/19 3/2/19 3/2/19 3/2/19





 03:15 04:00 04:06 05:00


 


Temp  98.3  





  98.3  


 


Pulse  74  65


 


Resp  15  16


 


B/P (MAP)  143/91 (108)  125/78 (94)


 


Pulse Ox 97   


 


O2 Delivery BiPAP/CPAP Nasal Cannula Bi-pap Nasal Cannula


 


O2 Flow Rate  3.0  3.0


 


    





    





 3/2/19 3/2/19 3/2/19 3/2/19





 06:00 06:19 07:00 08:00


 


Temp    97.5





    97.5


 


Pulse 69 73 63 60


 


Resp 15  17 16


 


B/P (MAP) 134/65 (88) 134/65 137/91 (106) 135/94 (108)


 


O2 Delivery Nasal Cannula  Nasal Cannula Nasal Cannula


 


O2 Flow Rate 3.0  3.0 4.0


 


    





    





 3/2/19 3/2/19 3/2/19 3/2/19





 08:00 08:17 09:00 10:00


 


Pulse   60 62


 


Resp   18 20


 


B/P (MAP)   130/87 (101) 142/91 (108)


 


Pulse Ox  98  


 


O2 Delivery Nasal Cannula Nasal Cannula Nasal Cannula Nasal Cannula


 


O2 Flow Rate 4.0 5.0 4.0 4.0





 3/2/19   





 11:00   


 


Pulse 60   


 


Resp 18   


 


B/P (MAP) 157/89 (111)   


 


Pulse Ox 40   


 


O2 Delivery BiPAP/CPAP   














Intake and Output   


 


 3/1/19 3/1/19 3/2/19





 15:00 23:00 07:00


 


Intake Total 100 ml 1527.90 ml 1391 ml


 


Output Total 450 ml 0 ml 675 ml


 


Balance -350 ml 1527.90 ml 716 ml

















JESSEE RODNEY MD Mar 2, 2019 12:30

## 2019-03-02 NOTE — PN
DATE:  03/02/2019



LOCATION:  He is in room .



SUBJECTIVE:  The patient is awake and alert, albeit mildly confused.  He is on

nasal cannula oxygen currently at 4 after BiPAP coming off earlier this morning.



OBJECTIVE:

VITAL SIGNS:  Stable.  He is afebrile.  Again, awake and alert, oriented to

person and place, but not date.

CHEST:  Reveals decreased breath sounds with bilateral rhonchi.

HEART:  Regular rate and rhythm.

ABDOMEN:  Benign.

EXTREMITIES:  Without cyanosis, clubbing, or edema.



Remains on Levaquin, steroids, pulmonary toilet, and has a nitroglycerin patch. 

Troponin has decreased to 0.15 this morning and we will defer to Cardiology need

for the same.  A pCO2 is down to 56 this morning, pH is up to 7.34, pO2 was 86

which is improved on 40% FiO2,  bicarbonate is elevated at 30 which is

relatively stable.  Chest x-ray this morning shows no acute cardiopulmonary

process.  CTA on 02/28 showed no evidence of pulmonary emboli.  There were

nodular opacities in the lower lobes with tree-in-bud opacities, new compared to

06/19/2018, possibly infectious or inflammatory in nature, bilateral

emphysematous changes noted with enlarged mediastinal and hilar lymph node seen

possibly reactive.



IMPRESSION:

1.  Exacerbation of chronic obstructive pulmonary disease, improving.

2.  Acute on chronic hypoxic hypercarbic respiratory failure.

3.  History of cerebrovascular accident.

4.  Coronary artery disease.

5.  Hyperlipidemia.

6.  Hypertension.



PLAN:  Continue present care.  He should stay in the ICU at least another day. 

We will continue present medications.  Help of all consultants appreciated. 

Expect slow and steady improvement.

 



______________________________

JOHNATHAN EAST MD



DR:  VARSHA/skylar  JOB#:  [id number] / [id number]

DD:  03/02/2019 09:48  DT:  03/02/2019 23:31

## 2019-03-03 VITALS — SYSTOLIC BLOOD PRESSURE: 138 MMHG | DIASTOLIC BLOOD PRESSURE: 72 MMHG

## 2019-03-03 VITALS — DIASTOLIC BLOOD PRESSURE: 71 MMHG | SYSTOLIC BLOOD PRESSURE: 144 MMHG

## 2019-03-03 VITALS — SYSTOLIC BLOOD PRESSURE: 158 MMHG | DIASTOLIC BLOOD PRESSURE: 90 MMHG

## 2019-03-03 VITALS — DIASTOLIC BLOOD PRESSURE: 86 MMHG | SYSTOLIC BLOOD PRESSURE: 164 MMHG

## 2019-03-03 VITALS — SYSTOLIC BLOOD PRESSURE: 113 MMHG | DIASTOLIC BLOOD PRESSURE: 77 MMHG

## 2019-03-03 VITALS — DIASTOLIC BLOOD PRESSURE: 103 MMHG | SYSTOLIC BLOOD PRESSURE: 149 MMHG

## 2019-03-03 VITALS — DIASTOLIC BLOOD PRESSURE: 72 MMHG | SYSTOLIC BLOOD PRESSURE: 165 MMHG

## 2019-03-03 VITALS — DIASTOLIC BLOOD PRESSURE: 76 MMHG | SYSTOLIC BLOOD PRESSURE: 147 MMHG

## 2019-03-03 VITALS — SYSTOLIC BLOOD PRESSURE: 162 MMHG | DIASTOLIC BLOOD PRESSURE: 91 MMHG

## 2019-03-03 VITALS — SYSTOLIC BLOOD PRESSURE: 140 MMHG | DIASTOLIC BLOOD PRESSURE: 74 MMHG

## 2019-03-03 VITALS — SYSTOLIC BLOOD PRESSURE: 147 MMHG | DIASTOLIC BLOOD PRESSURE: 83 MMHG

## 2019-03-03 VITALS — SYSTOLIC BLOOD PRESSURE: 200 MMHG | DIASTOLIC BLOOD PRESSURE: 86 MMHG

## 2019-03-03 VITALS — DIASTOLIC BLOOD PRESSURE: 73 MMHG | SYSTOLIC BLOOD PRESSURE: 144 MMHG

## 2019-03-03 VITALS — SYSTOLIC BLOOD PRESSURE: 185 MMHG | DIASTOLIC BLOOD PRESSURE: 80 MMHG

## 2019-03-03 VITALS — SYSTOLIC BLOOD PRESSURE: 195 MMHG | DIASTOLIC BLOOD PRESSURE: 93 MMHG

## 2019-03-03 VITALS — DIASTOLIC BLOOD PRESSURE: 81 MMHG | SYSTOLIC BLOOD PRESSURE: 140 MMHG

## 2019-03-03 VITALS — SYSTOLIC BLOOD PRESSURE: 164 MMHG | DIASTOLIC BLOOD PRESSURE: 90 MMHG

## 2019-03-03 VITALS — DIASTOLIC BLOOD PRESSURE: 63 MMHG | SYSTOLIC BLOOD PRESSURE: 143 MMHG

## 2019-03-03 VITALS — SYSTOLIC BLOOD PRESSURE: 155 MMHG | DIASTOLIC BLOOD PRESSURE: 77 MMHG

## 2019-03-03 VITALS — SYSTOLIC BLOOD PRESSURE: 158 MMHG | DIASTOLIC BLOOD PRESSURE: 86 MMHG

## 2019-03-03 PROCEDURE — 5A09357 ASSISTANCE WITH RESPIRATORY VENTILATION, LESS THAN 24 CONSECUTIVE HOURS, CONTINUOUS POSITIVE AIRWAY PRESSURE: ICD-10-PCS | Performed by: FAMILY MEDICINE

## 2019-03-03 RX ADMIN — CLOPIDOGREL BISULFATE SCH MG: 75 TABLET ORAL at 08:13

## 2019-03-03 RX ADMIN — SIMVASTATIN SCH MG: 40 TABLET, FILM COATED ORAL at 21:05

## 2019-03-03 RX ADMIN — IPRATROPIUM BROMIDE AND ALBUTEROL SULFATE SCH ML: .5; 3 SOLUTION RESPIRATORY (INHALATION) at 19:18

## 2019-03-03 RX ADMIN — IPRATROPIUM BROMIDE AND ALBUTEROL SULFATE SCH ML: .5; 3 SOLUTION RESPIRATORY (INHALATION) at 08:04

## 2019-03-03 RX ADMIN — METHYLPREDNISOLONE SODIUM SUCCINATE SCH MG: 40 INJECTION, POWDER, FOR SOLUTION INTRAMUSCULAR; INTRAVENOUS at 21:05

## 2019-03-03 RX ADMIN — Medication SCH CAP: at 21:06

## 2019-03-03 RX ADMIN — LISINOPRIL SCH MG: 10 TABLET ORAL at 11:04

## 2019-03-03 RX ADMIN — Medication SCH CAP: at 08:13

## 2019-03-03 RX ADMIN — ASPIRIN SCH MG: 81 TABLET, COATED ORAL at 08:13

## 2019-03-03 RX ADMIN — PANTOPRAZOLE SODIUM SCH MG: 40 TABLET, DELAYED RELEASE ORAL at 08:13

## 2019-03-03 RX ADMIN — IPRATROPIUM BROMIDE AND ALBUTEROL SULFATE SCH ML: .5; 3 SOLUTION RESPIRATORY (INHALATION) at 12:13

## 2019-03-03 RX ADMIN — IPRATROPIUM BROMIDE AND ALBUTEROL SULFATE SCH ML: .5; 3 SOLUTION RESPIRATORY (INHALATION) at 16:56

## 2019-03-03 RX ADMIN — METHYLPREDNISOLONE SODIUM SUCCINATE SCH MG: 40 INJECTION, POWDER, FOR SOLUTION INTRAMUSCULAR; INTRAVENOUS at 08:13

## 2019-03-03 NOTE — PDOC
PULMONARY PROGRESS NOTES


Subjective


LESS SOA OFF BIPAP


NO INCREASE COUGH


Vitals





Vital Signs








  Date Time  Temp Pulse Resp B/P (MAP) Pulse Ox O2 Delivery O2 Flow Rate FiO2


 


3/3/19 11:04  54  144/73    


 


3/3/19 11:00   18   Nasal Cannula 4.0 


 


3/3/19 05:45     99   


 


3/3/19 04:00 97.9       





 97.9       








ROS:  No Nausea, No Chest Pain, No Abdominal Pain, No Increase Cough


General:  Alert


Lungs:  Clear, Crackles


Cardiovascular:  S1


Abdomen:  Soft


Neuro Exam:  Alert


Extremities:  No Edema


Skin:  Warm


Labs





Laboratory Tests








Test


 3/1/19


16:40 3/2/19


06:34 3/2/19


08:10 3/3/19


03:00


 


O2 Saturation 92 % (92-99)   96 % (92-99)  


 


Arterial Blood pH


 7.32


(7.35-7.45) 


 7.34


(7.35-7.45) 





 


Arterial Blood pCO2 at


Patient Temp 63 mmHg


(35-46) 


 56 mmHg


(35-46) 





 


Arterial Blood pO2 at Patient


Temp 64 mmHg


() 


 86 mmHg


() 





 


Arterial Blood HCO3


 31 mmol/L


(21-28) 


 30 mmol/L


(21-28) 





 


Arterial Blood Base Excess


 4 mmol/L


(-3-3) 


 3 mmol/L


(-3-3) 





 


FiO2 40   40  


 


Platelet Count


 


 115 x10^3/uL


(140-400) 


 123 x10^3/uL


(140-400)


 


Troponin I Quantitative


 


 0.150 ng/mL


(0.000-0.055) 


 











Laboratory Tests








Test


 3/3/19


03:00


 


Platelet Count


 123 x10^3/uL


(140-400)








Medications





Active Scripts








 Medications  Dose


 Route/Sig


 Max Daily Dose Days Date Category


 


 Ibuprofen 400 Mg


 Tablet  400 Mg


 PO PRN Q6HRS PRN


   2/9/18 Reported


 


 Duoneb 0.5-3(2.5)


 Mg/3 Ml


  (Albuterol/Ipratropium)


 3 Ml Ampul.neb  3 Ml


 NEB QID


   2/9/18 Reported


 


 Aspir-Low


  (Aspirin) 81 Mg


 Tablet.dr  1 Tab


 PO DAILY


   4/21/17 Reported


 


 Ventolin Hfa


 Inhaler


  (Albuterol


 Sulfate) 18 Gm


 Hfa.aer.ad  


 


   4/21/17 Reported


 


 Losartan-Hctz


 50-12.5 Mg Tab


  (Losartan/Hydrochlorothiazide)


 1 Each Tablet  1 Tab


 PO DAILY


   4/21/17 Reported


 


 Simvastatin 40 Mg


 Tablet  40 Mg


 PO HS


   6/4/15 Reported


 


 Clopidogrel


  (Clopidogrel


 Bisulfate) 75 Mg


 Tablet  75 Mg


 PO DAILY


   6/4/15 Reported











Impression


.


IMPRESSION:


1.  Acute-on-chronic respiratory failure, multifactorial.


2.  Abnormal CT chest, revealing nodular infiltrates in the bases, suspect


infection, possibly Pseudomonas in a patient with underlying chronic obstructive


pulmonary disease.


3.  Acute exacerbation of chronic obstructive pulmonary disease.


4.  Elevated troponin per Cardiology.


5.  CT angiogram revealing no evidence of pulmonary embolism.


6.  Multiple comorbidities, as indicated above.


7.  Tobacco dependence, in remission.


8.  Peripheral vascular disease.





Plan


.


TRANSFER


BETTER TODAY


PRN AND QHS BIPAP


AVOID SEDATING MED


STEROIDS


ANTIBX











ERNESTINA MIGUEL MD Mar 3, 2019 11:57

## 2019-03-03 NOTE — PDOC
PROGRESS NOTES


Subjective


Subjective


Patient seen and examined





Objective


Objective





Vital Signs








  Date Time  Temp Pulse Resp B/P (MAP) Pulse Ox O2 Delivery O2 Flow Rate FiO2


 


3/3/19 08:06      Nasal Cannula 4.0 


 


3/3/19 06:32  52 20 155/77 (103)    


 


3/3/19 05:45     99   


 


3/3/19 04:00 97.9       





 97.9       














Intake and Output 


 


 3/3/19





 07:00


 


Intake Total 963 ml


 


Output Total 750 ml


 


Balance 213 ml


 


 


 


Intake Oral 300 ml


 


IV Total 663 ml


 


Output Urine Total 750 ml


 


# Voids 1


 


# Bowel Movements 1











Physical Exam


Abdomen:  Normal bowel sounds


Heart:  Regular rate


General:  mild distress


Lungs:  Other (mildly decreased breath sounds)





Assessment


Assessment


Problems


Medical Problems:


(1) Acute respiratory failure with hypercapnia


Status: Acute  





(2) COPD with acute exacerbation


Status: Acute  





(3) Non-STEMI (non-ST elevated myocardial infarction)


Status: Acute  








Acute on chronic respiratory failure. Exacerbation of COPD, recent pneumonia, 

continued tobacco use. Improving. Will continue as per the pulmonary service. 


Minimally elevated troponin. Demand ischemia. Heart catheterization within the 

past year showed no lesions greater than 30%. Echo shows intact LV systolic 

function. The patient is mildly bradycardic. We'll continue to monitor. 

Continue present treatment. 


Hypotension. Improving with IV fluids.


Hyperlipidemia. Continue present treatment.





Comment


Review of Relevant


I have reviewed the following items stephane (where applicable) has been applied.


Labs





Laboratory Tests








Test


 3/1/19


16:40 3/2/19


06:34 3/2/19


08:10 3/3/19


03:00


 


O2 Saturation 92 % (92-99)   96 % (92-99)  


 


Arterial Blood pH


 7.32


(7.35-7.45) 


 7.34


(7.35-7.45) 





 


Arterial Blood pCO2 at


Patient Temp 63 mmHg


(35-46) 


 56 mmHg


(35-46) 





 


Arterial Blood pO2 at Patient


Temp 64 mmHg


() 


 86 mmHg


() 





 


Arterial Blood HCO3


 31 mmol/L


(21-28) 


 30 mmol/L


(21-28) 





 


Arterial Blood Base Excess


 4 mmol/L


(-3-3) 


 3 mmol/L


(-3-3) 





 


FiO2 40   40  


 


Platelet Count


 


 115 x10^3/uL


(140-400) 


 123 x10^3/uL


(140-400)


 


Troponin I Quantitative


 


 0.150 ng/mL


(0.000-0.055) 


 











Laboratory Tests








Test


 3/3/19


03:00


 


Platelet Count


 123 x10^3/uL


(140-400)








Microbiology


2/28/19 Blood Culture - Preliminary, Resulted


          NO GROWTH AFTER 2 DAYS


Medications





Current Medications


Albuterol Sulfate (Ventolin Neb Soln) 2.5 mg STK-MED ONCE .ROUTE ;  Start 2/28/ 19 at 22:03;  Stop 2/28/19 at 22:04;  Status DC


Sodium Chloride 1,000 ml @  100 mls/hr Q10H IV  Last administered on 2/28/19at 

22:29;  Start 2/28/19 at 22:30;  Stop 3/1/19 at 08:29;  Status DC


Ipratropium Bromide (Atrovent) 0.5 mg 1X  ONCE NEB  Last administered on 2/28/ 19at 22:16;  Start 2/28/19 at 22:30;  Stop 2/28/19 at 22:31;  Status DC


Methylprednisolone Sodium Succinate (SOLU-Medrol 125MG VIAL) 125 mg 1X  ONCE IV

  Last administered on 2/28/19at 22:28;  Start 2/28/19 at 22:30;  Stop 2/28/19 

at 22:31;  Status DC


Albuterol Sulfate (Ventolin Neb Soln) 10 mg 1X  ONCE CONT NEB  Last 

administered on 2/28/19at 22:13;  Start 2/28/19 at 22:30;  Stop 2/28/19 at 22:31

;  Status DC


Budesonide (Pulmicort) 1 mg 1X  ONCE NEB  Last administered on 2/28/19at 22:13;

  Start 2/28/19 at 22:30;  Stop 2/28/19 at 22:31;  Status DC


Magnesium Sulfate 50 ml @ 25 mls/hr 1X  ONCE IV  Last administered on 2/28/19at 

22:32;  Start 2/28/19 at 22:30;  Stop 3/1/19 at 00:29;  Status DC


Budesonide (Pulmicort) 0.5 mg STK-MED ONCE NEB ;  Start 2/28/19 at 22:03;  Stop 

2/28/19 at 22:04;  Status DC


Heparin Sodium (Porcine) (Heparin Sodium) 4,000 unit 1X  ONCE IV  Last 

administered on 2/28/19at 23:39;  Start 2/28/19 at 23:30;  Stop 3/1/19 at 09:50

;  Status DC


Heparin Sodium/ Dextrose 500 ml @ 0 mls/hr CONT  PRN IV SEE I/O RECORD Last 

administered on 2/28/19at 23:41;  Start 2/28/19 at 23:15;  Stop 3/1/19 at 09:50

;  Status DC


Heparin Sodium (Porcine) (Heparin Sodium) 1,950 unit PRN Q6HRS  PRN IV FOR UFH 

LEVEL LESS THAN 0.2;  Start 2/28/19 at 23:15;  Stop 3/1/19 at 09:50;  Status DC


Info (Anti-Coagulation Monitoring By Pharmacy) 1 each PRN DAILY  PRN MC SEE 

COMMENTS Last administered on 3/1/19at 02:02;  Start 2/28/19 at 23:15;  Stop 3/1

/19 at 13:01;  Status DC


Ondansetron HCl (Zofran) 4 mg PRN Q8HRS  PRN IV NAUSEA/VOMITING 1ST CHOICE;  

Start 2/28/19 at 23:15;  Stop 3/1/19 at 23:14;  Status DC


Morphine Sulfate (Morphine Sulfate) 4 mg PRN Q2HR  PRN IV SEVERE PAIN;  Start 2/ 28/19 at 23:15;  Stop 3/1/19 at 23:14;  Status DC


Sodium Chloride 1,000 ml @  100 mls/hr Q10H IV  Last administered on 3/1/19at 15

:14;  Start 2/28/19 at 23:30;  Stop 3/1/19 at 23:29;  Status DC


Acetaminophen (Tylenol) 650 mg PRN Q4HRS  PRN PO FEVER;  Start 2/28/19 at 23:15

;  Stop 3/1/19 at 23:14;  Status DC


Nitroglycerin (Nitrostat) 0.4 mg PRN Q5MIN  PRN SL CHEST PAIN;  Start 2/28/19 

at 23:15;  Stop 3/1/19 at 23:14;  Status DC


Albuterol/ Ipratropium (Duoneb) 3 ml RTQID NEB  Last administered on 3/1/19at 08

:24;  Start 3/1/19 at 08:00;  Stop 3/1/19 at 09:05;  Status DC


Iohexol (Omnipaque 350 Mg/ml) 75 ml 1X  ONCE IV  Last administered on 3/1/19at 

00:40;  Start 3/1/19 at 00:30;  Stop 3/1/19 at 00:31;  Status DC


Info (CONTRAST GIVEN -- Rx MONITORING) 1 each PRN DAILY  PRN MC SEE COMMENTS;  

Start 3/1/19 at 00:15;  Stop 3/3/19 at 00:14;  Status DC


Aspirin (Ecotrin) 81 mg DAILY PO  Last administered on 3/3/19at 08:13;  Start 3/

1/19 at 09:30


Clopidogrel Bisulfate (Plavix) 75 mg DAILY PO  Last administered on 3/3/19at 08:

13;  Start 3/1/19 at 09:30


Albuterol/ Ipratropium (Duoneb) 3 ml RTQID NEB  Last administered on 3/3/19at 08

:04;  Start 3/1/19 at 12:00


Non-Formulary Medication (Losartan/ Hydrochlorothiazide (Losartan-Hctz 50-12.5 

Mg Tab)) 1 tab DAILY PO ;  Start 3/1/19 at 09:00;  Stop 3/1/19 at 09:00;  

Status DC


Simvastatin (Zocor) 40 mg QHS PO  Last administered on 3/2/19at 20:55;  Start 3/

1/19 at 21:00


Methylprednisolone Sodium Succinate (SOLU-Medrol 40MG VIAL) 60 mg Q12HR IV  

Last administered on 3/3/19at 08:13;  Start 3/1/19 at 09:00


Pantoprazole Sodium (Protonix) 40 mg DAILYAC PO  Last administered on 3/3/19at 

08:13;  Start 3/1/19 at 09:30


Nitroglycerin (Nitro-Bid Oint) 0.5 inch Q8HRS TP  Last administered on 3/2/19at 

21:03;  Start 3/1/19 at 09:00;  Stop 3/2/19 at 23:04;  Status DC


Sodium Chloride 500 ml @  500 mls/hr 1X  ONCE IV  Last administered on 3/1/19at 

10:00;  Start 3/1/19 at 10:00;  Stop 3/1/19 at 10:59;  Status DC


Levofloxacin/ Dextrose 100 ml @  100 mls/hr Q24H IV  Last administered on 3/2/

19at 13:47;  Start 3/1/19 at 13:00


Albuterol Sulfate (Ventolin Neb Soln) 2.5 mg 1X  ONCE NEB  Last administered on 

3/2/19at 01:00;  Start 3/2/19 at 01:00;  Stop 3/2/19 at 01:01;  Status DC


Lactobacillus Rhamnosus (Culturelle) 1 cap BID PO  Last administered on 3/3/

19at 08:13;  Start 3/2/19 at 21:00


Nitroglycerin/ Dextrose 250 ml @  1.5 mls/hr CONT  PRN IV SEE I/O RECORD Last 

administered on 3/2/19at 23:10;  Start 3/2/19 at 23:00





Active Scripts


Active


Reported


Ibuprofen 400 Mg Tablet 400 Mg PO PRN Q6HRS PRN


Duoneb 0.5-3(2.5) Mg/3 Ml (Albuterol/Ipratropium) 3 Ml Ampul.neb 3 Ml NEB QID


Aspir-Low (Aspirin) 81 Mg Tablet.dr 1 Tab PO DAILY


Ventolin Hfa Inhaler (Albuterol Sulfate) 18 Gm Hfa.aer.ad   


Losartan-Hctz 50-12.5 Mg Tab (Losartan/Hydrochlorothiazide) 1 Each Tablet 1 Tab 

PO DAILY


Simvastatin 40 Mg Tablet 40 Mg PO HS


Clopidogrel (Clopidogrel Bisulfate) 75 Mg Tablet 75 Mg PO DAILY


Vitals/I & O





Vital Sign - Last 24 Hours








 3/2/19 3/2/19 3/2/19 3/2/19





 09:00 10:00 11:00 12:00


 


Pulse 60 62 60 


 


Resp 18 20 18 


 


B/P (MAP) 130/87 (101) 142/91 (108) 157/89 (111) 


 


Pulse Ox   40 


 


O2 Delivery Nasal Cannula Nasal Cannula BiPAP/CPAP Nasal Cannula


 


O2 Flow Rate 4.0 4.0  4.0





 3/2/19 3/2/19 3/2/19 3/2/19





 12:00 12:28 13:00 13:48


 


Pulse 51  49 50


 


Resp 20  20 


 


B/P (MAP) 102/53 (69)  107/61 (76) 107/67


 


Pulse Ox 40 98 40 


 


O2 Delivery BiPAP/CPAP BiPAP/CPAP BiPAP/CPAP 





 3/2/19 3/2/19 3/2/19 3/2/19





 14:00 15:00 15:55 16:00


 


Pulse 75 64  


 


Resp 20 21  


 


B/P (MAP) 112/65 (81) 146/72 (96)  


 


Pulse Ox   95 


 


O2 Delivery Nasal Cannula Nasal Cannula Nasal Cannula Nasal Cannula


 


O2 Flow Rate 4.0 4.0 4.0 4.0





 3/2/19 3/2/19 3/2/19 3/2/19





 16:00 17:00 18:00 19:10


 


Temp    97.5





    97.5


 


Pulse 62 69 61 64


 


Resp 19 15 17 20


 


B/P (MAP) 140/70 (93) 141/84 (103)  148/74 (98)


 


O2 Delivery Nasal Cannula Nasal Cannula Nasal Cannula Nasal Cannula


 


O2 Flow Rate 4.0 4.0 4.0 4.0


 


    





    





 3/2/19 3/2/19 3/2/19 3/2/19





 19:15 19:46 21:03 21:10


 


Pulse   64 56


 


Resp    20


 


B/P (MAP)   148/74 170/96 (120)


 


Pulse Ox  99  


 


O2 Delivery Nasal Cannula Nasal Cannula  Nasal Cannula


 


O2 Flow Rate 4.0 4.0  4.0





 3/2/19 3/2/19 3/2/19 3/2/19





 22:10 22:44 23:03 23:18


 


Pulse 57  52 


 


Resp 20  20 


 


B/P (MAP) 188/87 (120)  170/103 (125) 


 


Pulse Ox  98 40 


 


O2 Delivery Nasal Cannula BiPAP/CPAP BiPAP/CPAP Bi-pap


 


O2 Flow Rate 4.0   





 3/2/19 3/3/19 3/3/19 3/3/19





 23:20 00:00 00:32 01:00


 


Temp 98.7   





 98.7   


 


Pulse 49 55 49 60


 


Resp 20 20 20 20


 


B/P (MAP) 164/88 (113) 164/86 (112) 149/103 (118) 147/83 (104)


 


Pulse Ox 40 40 40 40


 


O2 Delivery BiPAP/CPAP BiPAP/CPAP BiPAP/CPAP BiPAP/CPAP


 


    





    





 3/3/19 3/3/19 3/3/19 3/3/19





 01:10 01:30 02:00 02:33


 


Pulse  55 55 48


 


Resp  20 20 20


 


B/P (MAP)  140/81 (100) 164/90 (114) 158/90 (112)


 


Pulse Ox 98 40 40 40


 


O2 Delivery BiPAP/CPAP BiPAP/CPAP BiPAP/CPAP BiPAP/CPAP





 3/3/19 3/3/19 3/3/19 3/3/19





 03:10 03:56 04:00 05:00


 


Temp   97.9 





   97.9 


 


Pulse   46 48


 


Resp   20 20


 


B/P (MAP)   158/86 (110) 162/91 (114)


 


Pulse Ox 97  40 40


 


O2 Delivery BiPAP/CPAP Bi-pap BiPAP/CPAP BiPAP/CPAP


 


    





    





 3/3/19 3/3/19 3/3/19 3/3/19





 05:45 06:13 06:32 08:06


 


Pulse  54 52 


 


Resp  20 20 


 


B/P (MAP)  200/86 (124) 155/77 (103) 


 


Pulse Ox 99   


 


O2 Delivery BiPAP/CPAP Nasal Cannula Nasal Cannula Nasal Cannula


 


O2 Flow Rate  4.0 4.0 4.0














Intake and Output   


 


 3/2/19 3/2/19 3/3/19





 15:00 23:00 07:00


 


Intake Total 100 ml 854 ml 9 ml


 


Output Total 350 ml 0 ml 400 ml


 


Balance -250 ml 854 ml -391 ml

















JESSEE RODNEY MD Mar 3, 2019 08:45

## 2019-03-04 VITALS — DIASTOLIC BLOOD PRESSURE: 98 MMHG | SYSTOLIC BLOOD PRESSURE: 155 MMHG

## 2019-03-04 VITALS — DIASTOLIC BLOOD PRESSURE: 75 MMHG | SYSTOLIC BLOOD PRESSURE: 178 MMHG

## 2019-03-04 VITALS — SYSTOLIC BLOOD PRESSURE: 151 MMHG | DIASTOLIC BLOOD PRESSURE: 72 MMHG

## 2019-03-04 VITALS — SYSTOLIC BLOOD PRESSURE: 137 MMHG | DIASTOLIC BLOOD PRESSURE: 74 MMHG

## 2019-03-04 VITALS — DIASTOLIC BLOOD PRESSURE: 96 MMHG | SYSTOLIC BLOOD PRESSURE: 171 MMHG

## 2019-03-04 VITALS — DIASTOLIC BLOOD PRESSURE: 68 MMHG | SYSTOLIC BLOOD PRESSURE: 117 MMHG

## 2019-03-04 VITALS — DIASTOLIC BLOOD PRESSURE: 66 MMHG | SYSTOLIC BLOOD PRESSURE: 117 MMHG

## 2019-03-04 VITALS — DIASTOLIC BLOOD PRESSURE: 67 MMHG | SYSTOLIC BLOOD PRESSURE: 135 MMHG

## 2019-03-04 VITALS — DIASTOLIC BLOOD PRESSURE: 67 MMHG | SYSTOLIC BLOOD PRESSURE: 140 MMHG

## 2019-03-04 VITALS — DIASTOLIC BLOOD PRESSURE: 74 MMHG | SYSTOLIC BLOOD PRESSURE: 148 MMHG

## 2019-03-04 VITALS — DIASTOLIC BLOOD PRESSURE: 65 MMHG | SYSTOLIC BLOOD PRESSURE: 111 MMHG

## 2019-03-04 VITALS — DIASTOLIC BLOOD PRESSURE: 77 MMHG | SYSTOLIC BLOOD PRESSURE: 168 MMHG

## 2019-03-04 PROCEDURE — 5A09357 ASSISTANCE WITH RESPIRATORY VENTILATION, LESS THAN 24 CONSECUTIVE HOURS, CONTINUOUS POSITIVE AIRWAY PRESSURE: ICD-10-PCS | Performed by: FAMILY MEDICINE

## 2019-03-04 RX ADMIN — IPRATROPIUM BROMIDE AND ALBUTEROL SULFATE SCH ML: .5; 3 SOLUTION RESPIRATORY (INHALATION) at 08:01

## 2019-03-04 RX ADMIN — METHYLPREDNISOLONE SODIUM SUCCINATE SCH MG: 40 INJECTION, POWDER, FOR SOLUTION INTRAMUSCULAR; INTRAVENOUS at 08:10

## 2019-03-04 RX ADMIN — CLOPIDOGREL BISULFATE SCH MG: 75 TABLET ORAL at 08:09

## 2019-03-04 RX ADMIN — IPRATROPIUM BROMIDE AND ALBUTEROL SULFATE SCH ML: .5; 3 SOLUTION RESPIRATORY (INHALATION) at 19:38

## 2019-03-04 RX ADMIN — SIMVASTATIN SCH MG: 40 TABLET, FILM COATED ORAL at 20:47

## 2019-03-04 RX ADMIN — Medication SCH CAP: at 08:09

## 2019-03-04 RX ADMIN — IPRATROPIUM BROMIDE AND ALBUTEROL SULFATE SCH ML: .5; 3 SOLUTION RESPIRATORY (INHALATION) at 11:15

## 2019-03-04 RX ADMIN — IPRATROPIUM BROMIDE AND ALBUTEROL SULFATE SCH ML: .5; 3 SOLUTION RESPIRATORY (INHALATION) at 15:25

## 2019-03-04 RX ADMIN — LISINOPRIL SCH MG: 10 TABLET ORAL at 08:10

## 2019-03-04 RX ADMIN — Medication SCH CAP: at 20:47

## 2019-03-04 RX ADMIN — PANTOPRAZOLE SODIUM SCH MG: 40 TABLET, DELAYED RELEASE ORAL at 08:10

## 2019-03-04 RX ADMIN — ASPIRIN SCH MG: 81 TABLET, COATED ORAL at 08:09

## 2019-03-04 RX ADMIN — LISINOPRIL SCH MG: 20 TABLET ORAL at 08:34

## 2019-03-04 NOTE — NUR
I have reviewed the documentation, assessment and/or procedures by nursing student Keena Barakat and concur with her documentation unless otherwise noted.

## 2019-03-04 NOTE — PDOC
PROGRESS NOTES


Subjective


Subjective


Patient seen and examined


The patient is feeling better today





Objective


Objective





Vital Signs








  Date Time  Temp Pulse Resp B/P (MAP) Pulse Ox O2 Delivery O2 Flow Rate FiO2


 


3/4/19 15:25      Nasal Cannula 3.0 


 


3/4/19 15:15 97.6 82 24 178/75 (109) 97   





 97.6       














Intake and Output 


 


 3/4/19





 07:00


 


Intake Total 1893 ml


 


Output Total 1910 ml


 


Balance -17 ml


 


 


 


Intake Oral 1200 ml


 


Other 693 ml


 


Output Urine Total 1910 ml











Physical Exam


Abdomen:  Normal bowel sounds


Heart:  Regular rate


General:  mild distress


Lungs:  Other (mildly decreased breath sounds)





Assessment


Assessment


Problems


Medical Problems:


(1) Acute respiratory failure with hypercapnia


Status: Acute  





(2) COPD with acute exacerbation


Status: Acute  





(3) Non-STEMI (non-ST elevated myocardial infarction)


Status: Acute  








Acute on chronic respiratory failure. Exacerbation of COPD, recent pneumonia, 

continued tobacco use. The patient continues to improve. Will continue as per 

the pulmonary service. 


Minimally elevated troponin. Demand ischemia. Heart catheterization within the 

past year showed no lesions greater than 30%. Echo shows intact LV systolic 

function. The patient is mildly bradycardic. We'll continue to monitor. 

Continue present treatment. 


Hypotension. Improving with IV fluids.


Hyperlipidemia. Continue present treatment.





Comment


Review of Relevant


I have reviewed the following items stephane (where applicable) has been applied.


Labs





Laboratory Tests








Test


 3/3/19


03:00 3/4/19


03:15


 


Platelet Count


 123 x10^3/uL


(140-400) 119 x10^3/uL


(140-400)








Laboratory Tests








Test


 3/4/19


03:15


 


Platelet Count


 119 x10^3/uL


(140-400)








Microbiology


2/28/19 Blood Culture - Preliminary, Resulted


          NO GROWTH AFTER 3 DAYS


Medications





Current Medications


Albuterol Sulfate (Ventolin Neb Soln) 2.5 mg STK-MED ONCE .ROUTE ;  Start 2/28/ 19 at 22:03;  Stop 2/28/19 at 22:04;  Status DC


Sodium Chloride 1,000 ml @  100 mls/hr Q10H IV  Last administered on 2/28/19at 

22:29;  Start 2/28/19 at 22:30;  Stop 3/1/19 at 08:29;  Status DC


Ipratropium Bromide (Atrovent) 0.5 mg 1X  ONCE NEB  Last administered on 2/28/ 19at 22:16;  Start 2/28/19 at 22:30;  Stop 2/28/19 at 22:31;  Status DC


Methylprednisolone Sodium Succinate (SOLU-Medrol 125MG VIAL) 125 mg 1X  ONCE IV

  Last administered on 2/28/19at 22:28;  Start 2/28/19 at 22:30;  Stop 2/28/19 

at 22:31;  Status DC


Albuterol Sulfate (Ventolin Neb Soln) 10 mg 1X  ONCE CONT NEB  Last 

administered on 2/28/19at 22:13;  Start 2/28/19 at 22:30;  Stop 2/28/19 at 22:31

;  Status DC


Budesonide (Pulmicort) 1 mg 1X  ONCE NEB  Last administered on 2/28/19at 22:13;

  Start 2/28/19 at 22:30;  Stop 2/28/19 at 22:31;  Status DC


Magnesium Sulfate 50 ml @ 25 mls/hr 1X  ONCE IV  Last administered on 2/28/19at 

22:32;  Start 2/28/19 at 22:30;  Stop 3/1/19 at 00:29;  Status DC


Budesonide (Pulmicort) 0.5 mg STK-MED ONCE NEB ;  Start 2/28/19 at 22:03;  Stop 

2/28/19 at 22:04;  Status DC


Heparin Sodium (Porcine) (Heparin Sodium) 4,000 unit 1X  ONCE IV  Last 

administered on 2/28/19at 23:39;  Start 2/28/19 at 23:30;  Stop 3/1/19 at 09:50

;  Status DC


Heparin Sodium/ Dextrose 500 ml @ 0 mls/hr CONT  PRN IV SEE I/O RECORD Last 

administered on 2/28/19at 23:41;  Start 2/28/19 at 23:15;  Stop 3/1/19 at 09:50

;  Status DC


Heparin Sodium (Porcine) (Heparin Sodium) 1,950 unit PRN Q6HRS  PRN IV FOR UFH 

LEVEL LESS THAN 0.2;  Start 2/28/19 at 23:15;  Stop 3/1/19 at 09:50;  Status DC


Info (Anti-Coagulation Monitoring By Pharmacy) 1 each PRN DAILY  PRN MC SEE 

COMMENTS Last administered on 3/1/19at 02:02;  Start 2/28/19 at 23:15;  Stop 3/1

/19 at 13:01;  Status DC


Ondansetron HCl (Zofran) 4 mg PRN Q8HRS  PRN IV NAUSEA/VOMITING 1ST CHOICE;  

Start 2/28/19 at 23:15;  Stop 3/1/19 at 23:14;  Status DC


Morphine Sulfate (Morphine Sulfate) 4 mg PRN Q2HR  PRN IV SEVERE PAIN;  Start 2/ 28/19 at 23:15;  Stop 3/1/19 at 23:14;  Status DC


Sodium Chloride 1,000 ml @  100 mls/hr Q10H IV  Last administered on 3/1/19at 15

:14;  Start 2/28/19 at 23:30;  Stop 3/1/19 at 23:29;  Status DC


Acetaminophen (Tylenol) 650 mg PRN Q4HRS  PRN PO FEVER;  Start 2/28/19 at 23:15

;  Stop 3/1/19 at 23:14;  Status DC


Nitroglycerin (Nitrostat) 0.4 mg PRN Q5MIN  PRN SL CHEST PAIN;  Start 2/28/19 

at 23:15;  Stop 3/1/19 at 23:14;  Status DC


Albuterol/ Ipratropium (Duoneb) 3 ml RTQID NEB  Last administered on 3/1/19at 08

:24;  Start 3/1/19 at 08:00;  Stop 3/1/19 at 09:05;  Status DC


Iohexol (Omnipaque 350 Mg/ml) 75 ml 1X  ONCE IV  Last administered on 3/1/19at 

00:40;  Start 3/1/19 at 00:30;  Stop 3/1/19 at 00:31;  Status DC


Info (CONTRAST GIVEN -- Rx MONITORING) 1 each PRN DAILY  PRN MC SEE COMMENTS;  

Start 3/1/19 at 00:15;  Stop 3/3/19 at 00:14;  Status DC


Aspirin (Ecotrin) 81 mg DAILY PO  Last administered on 3/4/19at 08:09;  Start 3/

1/19 at 09:30


Clopidogrel Bisulfate (Plavix) 75 mg DAILY PO  Last administered on 3/4/19at 08:

09;  Start 3/1/19 at 09:30


Albuterol/ Ipratropium (Duoneb) 3 ml RTQID NEB  Last administered on 3/4/19at 15

:25;  Start 3/1/19 at 12:00


Non-Formulary Medication (Losartan/ Hydrochlorothiazide (Losartan-Hctz 50-12.5 

Mg Tab)) 1 tab DAILY PO ;  Start 3/1/19 at 09:00;  Stop 3/1/19 at 09:00;  

Status DC


Simvastatin (Zocor) 40 mg QHS PO  Last administered on 3/3/19at 21:05;  Start 3/

1/19 at 21:00


Methylprednisolone Sodium Succinate (SOLU-Medrol 40MG VIAL) 60 mg Q12HR IV  

Last administered on 3/4/19at 08:10;  Start 3/1/19 at 09:00;  Stop 3/4/19 at 08:

24;  Status DC


Pantoprazole Sodium (Protonix) 40 mg DAILYAC PO  Last administered on 3/4/19at 

08:10;  Start 3/1/19 at 09:30


Nitroglycerin (Nitro-Bid Oint) 0.5 inch Q8HRS TP  Last administered on 3/2/19at 

21:03;  Start 3/1/19 at 09:00;  Stop 3/2/19 at 23:04;  Status DC


Sodium Chloride 500 ml @  500 mls/hr 1X  ONCE IV  Last administered on 3/1/19at 

10:00;  Start 3/1/19 at 10:00;  Stop 3/1/19 at 10:59;  Status DC


Levofloxacin/ Dextrose 100 ml @  100 mls/hr Q24H IV  Last administered on 3/4/

19at 13:00;  Start 3/1/19 at 13:00


Albuterol Sulfate (Ventolin Neb Soln) 2.5 mg 1X  ONCE NEB  Last administered on 

3/2/19at 01:00;  Start 3/2/19 at 01:00;  Stop 3/2/19 at 01:01;  Status DC


Lactobacillus Rhamnosus (Culturelle) 1 cap BID PO  Last administered on 3/4/

19at 08:09;  Start 3/2/19 at 21:00


Nitroglycerin/ Dextrose 250 ml @  1.5 mls/hr CONT  PRN IV SEE I/O RECORD Last 

administered on 3/2/19at 23:10;  Start 3/2/19 at 23:00


Lisinopril (Prinivil) 10 mg DAILY PO  Last administered on 3/4/19at 08:10;  

Start 3/3/19 at 11:30;  Stop 3/4/19 at 08:24;  Status DC


Lisinopril (Prinivil) 20 mg DAILY PO ;  Start 3/4/19 at 09:00


Prednisone (Prednisone) 50 mg DAILY08 PO ;  Start 3/4/19 at 08:00


Lisinopril (Prinivil) 10 mg 1X  ONCE PO  Last administered on 3/4/19at 14:27;  

Start 3/4/19 at 14:15;  Stop 3/4/19 at 14:16;  Status DC


Hydralazine HCl (Apresoline Inj) 10 mg PRN Q4HRS  PRN IVP ELEVATED BP, SEE 

COMMENTS Last administered on 3/4/19at 14:27;  Start 3/4/19 at 14:15





Active Scripts


Active


Reported


Ibuprofen 400 Mg Tablet 400 Mg PO PRN Q6HRS PRN


Duoneb 0.5-3(2.5) Mg/3 Ml (Albuterol/Ipratropium) 3 Ml Ampul.neb 3 Ml NEB QID


Aspir-Low (Aspirin) 81 Mg Tablet.dr 1 Tab PO DAILY


Ventolin Hfa Inhaler (Albuterol Sulfate) 18 Gm Hfa.aer.ad   


Losartan-Hctz 50-12.5 Mg Tab (Losartan/Hydrochlorothiazide) 1 Each Tablet 1 Tab 

PO DAILY


Simvastatin 40 Mg Tablet 40 Mg PO HS


Clopidogrel (Clopidogrel Bisulfate) 75 Mg Tablet 75 Mg PO DAILY


Vitals/I & O





Vital Sign - Last 24 Hours








 3/3/19 3/3/19 3/3/19 3/3/19





 19:03 19:18 19:30 20:00


 


Temp 97.5   





 97.5   


 


Pulse 60   62


 


Resp 16   


 


B/P (MAP) 113/77 (89)   138/72 (94)


 


Pulse Ox 97 97  


 


O2 Delivery Nasal Cannula Nasal Cannula Nasal Cannula 


 


O2 Flow Rate 4.0 4.0 4.0 


 


    





    





 3/3/19 3/3/19 3/3/19 3/3/19





 21:00 22:00 22:40 23:51


 


Temp   98.0 





   98.0 


 


Pulse 62 60 59 


 


Resp   18 


 


B/P (MAP) 140/74 (96) 147/76 (99) 143/63 (89) 


 


Pulse Ox   94 99


 


O2 Delivery   Nasal Cannula BiPAP/CPAP


 


O2 Flow Rate   4.0 


 


    





    





 3/4/19 3/4/19 3/4/19 3/4/19





 00:01 01:00 02:00 02:16


 


Pulse 60 54 54 


 


B/P (MAP) 140/67 (91) 111/65 (80) 117/66 (83) 


 


Pulse Ox    99


 


O2 Delivery    BiPAP/CPAP





 3/4/19 3/4/19 3/4/19 3/4/19





 02:59 04:00 05:00 06:00


 


Temp 97.5   





 97.5   


 


Pulse 54 54 52 52


 


Resp 24   


 


B/P (MAP) 117/68 (84) 137/74 (95) 151/72 (98) 135/67 (89)


 


Pulse Ox 98   


 


O2 Delivery BiPAP/CPAP   


 


    





    





 3/4/19 3/4/19 3/4/19 3/4/19





 07:25 08:00 08:01 08:10


 


Temp 97.4   





 97.4   


 


Pulse 56   56


 


Resp 24   


 


B/P (MAP) 148/74 (98)   162/68


 


Pulse Ox 97  97 


 


O2 Delivery Nasal Cannula Nasal Cannula Nasal Cannula 


 


O2 Flow Rate 4.0 4.0 4.0 


 


    





    





 3/4/19 3/4/19 3/4/19 3/4/19





 11:00 11:15 14:27 14:27


 


Temp 97.5   





 97.5   


 


Pulse 65  56 60


 


Resp 24   


 


B/P (MAP) 168/77 (107)  176/77 176/77


 


Pulse Ox 96 96  


 


O2 Delivery Nasal Cannula Nasal Cannula  


 


O2 Flow Rate 3.0 3.0  


 


    





    





 3/4/19 3/4/19  





 15:15 15:25  


 


Temp 97.6   





 97.6   


 


Pulse 82   


 


Resp 24   


 


B/P (MAP) 178/75 (109)   


 


Pulse Ox 97   


 


O2 Delivery Nasal Cannula Nasal Cannula  


 


O2 Flow Rate 4.0 3.0  














Intake and Output   


 


 3/3/19 3/3/19 3/4/19





 15:00 23:00 07:00


 


Intake Total 700 ml  1193 ml


 


Output Total 600 ml 1010 ml 300 ml


 


Balance 100 ml -1010 ml 893 ml

















JESSEE RODNEY MD Mar 4, 2019 17:32

## 2019-03-04 NOTE — PN
DATE:  03/03/2019



LOCATION:  He is in room 106, ICU.



SUBJECTIVE:  The patient is awake, alert, feels like he is breathing much better

today, was on BiPAP overnight again, but off for the day.



OBJECTIVE:

VITAL SIGNS:  Stable.  He is afebrile.  He remains on 4 liters per nasal cannula

oxygen.

CHEST:  Reveals much fewer rhonchi and better breath sounds today.

HEART:  Has some irregularity which are mainly PACs.  He remains on the

bradycardic side, but is asymptomatic.

ABDOMEN:  Soft.

EXTREMITIES:  Without cyanosis, clubbing or edema.



Platelet count this morning is 123,000.  Blood pressures were increased and the

patient has been placed on a nitroglycerin drip by Cardiology and lisinopril was

started this morning.



IMPRESSION:

1.  Exacerbation of chronic obstructive pulmonary disease, improving.

2.  Acute on chronic hypoxic hypercarbic respiratory failure, improving.

3.  History of cerebrovascular accident.

4.  Coronary artery disease.

5.  Hypertension.



PLAN:  I believe he can transfer to the telemetry floor and we will discuss with

nursing the same.  Otherwise, we will continue present medications with plans on

discontinuing of nitro drip once blood pressures begin to recede from the

addition hopefully of the lisinopril.  The patient at home was taking Hyzaar and

this has not been restarted since admission, but the lisinopril is today, so we

will see how he does with the same.

 



______________________________

JOHNATHAN EAST MD



DR:  VARSHA/skylar  JOB#:  [id number] / [id number]

DD:  03/03/2019 12:04  DT:  03/04/2019 05:24

## 2019-03-04 NOTE — PDOC
Provider Note


Provider Note


alert, bp ok, lucid/ no focal signs- still diffuse wheezes, cv rr- inc 

lisinopril, po pred now, rest same- may need rehab re weakness, will see pt











GUS CHERY MD Mar 4, 2019 08:23

## 2019-03-04 NOTE — NUR
SS following for discharge planning. SS reviewed pt chart. Pt is from home with spouse and 
is currently requiring oxygen. PT/OT ordered. SS will await PT/OT evaluations and 
recommendations and will proceed accordingly with discharge planning.

## 2019-03-04 NOTE — PDOC
PULMONARY PROGRESS NOTES


Subjective


LESS SOA OFF BIPAP


NO INCREASE COUGH


Vitals





Vital Signs








  Date Time  Temp Pulse Resp B/P (MAP) Pulse Ox O2 Delivery O2 Flow Rate FiO2


 


3/4/19 08:10  56  162/68    


 


3/4/19 08:01     97 Nasal Cannula 4.0 


 


3/4/19 07:25 97.4  24     





 97.4       








ROS:  No Nausea, No Chest Pain, No Abdominal Pain, No Increase Cough


General:  Alert


Lungs:  Clear, Crackles


Cardiovascular:  S1


Abdomen:  Soft


Neuro Exam:  Alert


Extremities:  No Edema


Skin:  Warm


Labs





Laboratory Tests








Test


 3/3/19


03:00 3/4/19


03:15


 


Platelet Count


 123 x10^3/uL


(140-400) 119 x10^3/uL


(140-400)








Laboratory Tests








Test


 3/4/19


03:15


 


Platelet Count


 119 x10^3/uL


(140-400)








Medications





Active Scripts








 Medications  Dose


 Route/Sig


 Max Daily Dose Days Date Category


 


 Ibuprofen 400 Mg


 Tablet  400 Mg


 PO PRN Q6HRS PRN


   2/9/18 Reported


 


 Duoneb 0.5-3(2.5)


 Mg/3 Ml


  (Albuterol/Ipratropium)


 3 Ml Ampul.neb  3 Ml


 NEB QID


   2/9/18 Reported


 


 Aspir-Low


  (Aspirin) 81 Mg


 Tablet.dr  1 Tab


 PO DAILY


   4/21/17 Reported


 


 Ventolin Hfa


 Inhaler


  (Albuterol


 Sulfate) 18 Gm


 Hfa.aer.ad  


 


   4/21/17 Reported


 


 Losartan-Hctz


 50-12.5 Mg Tab


  (Losartan/Hydrochlorothiazide)


 1 Each Tablet  1 Tab


 PO DAILY


   4/21/17 Reported


 


 Simvastatin 40 Mg


 Tablet  40 Mg


 PO HS


   6/4/15 Reported


 


 Clopidogrel


  (Clopidogrel


 Bisulfate) 75 Mg


 Tablet  75 Mg


 PO DAILY


   6/4/15 Reported











Impression


.


IMPRESSION:


1.  Acute-on-chronic respiratory failure, multifactorial.


2.  Abnormal CT chest, revealing nodular infiltrates in the bases, suspect


infection, possibly Pseudomonas in a patient with underlying chronic obstructive


pulmonary disease.


3.  Acute exacerbation of chronic obstructive pulmonary disease.


4.  Elevated troponin per Cardiology.


5.  CT angiogram revealing no evidence of pulmonary embolism.


6.  Multiple comorbidities, as indicated above.


7.  Tobacco dependence, in remission.


8.  Peripheral vascular disease.





Plan


.


CHECK 5 M WALK AND NOCT DESAT


D/C PLANING


BETTER TODAY


PRN AND QHS BIPAP


AVOID SEDATING MED


STEROIDS


ANTIBX











SISILLO,SABATO MD Mar 4, 2019 09:56

## 2019-03-05 VITALS — SYSTOLIC BLOOD PRESSURE: 146 MMHG | DIASTOLIC BLOOD PRESSURE: 78 MMHG

## 2019-03-05 VITALS — SYSTOLIC BLOOD PRESSURE: 167 MMHG | DIASTOLIC BLOOD PRESSURE: 81 MMHG

## 2019-03-05 VITALS — DIASTOLIC BLOOD PRESSURE: 87 MMHG | SYSTOLIC BLOOD PRESSURE: 137 MMHG

## 2019-03-05 VITALS — DIASTOLIC BLOOD PRESSURE: 78 MMHG | SYSTOLIC BLOOD PRESSURE: 170 MMHG

## 2019-03-05 VITALS — DIASTOLIC BLOOD PRESSURE: 91 MMHG | SYSTOLIC BLOOD PRESSURE: 151 MMHG

## 2019-03-05 VITALS — DIASTOLIC BLOOD PRESSURE: 93 MMHG | SYSTOLIC BLOOD PRESSURE: 136 MMHG

## 2019-03-05 VITALS — DIASTOLIC BLOOD PRESSURE: 74 MMHG | SYSTOLIC BLOOD PRESSURE: 146 MMHG

## 2019-03-05 RX ADMIN — PANTOPRAZOLE SODIUM SCH MG: 40 TABLET, DELAYED RELEASE ORAL at 05:59

## 2019-03-05 RX ADMIN — IPRATROPIUM BROMIDE AND ALBUTEROL SULFATE SCH ML: .5; 3 SOLUTION RESPIRATORY (INHALATION) at 15:22

## 2019-03-05 RX ADMIN — IPRATROPIUM BROMIDE AND ALBUTEROL SULFATE SCH ML: .5; 3 SOLUTION RESPIRATORY (INHALATION) at 10:55

## 2019-03-05 RX ADMIN — IPRATROPIUM BROMIDE AND ALBUTEROL SULFATE SCH ML: .5; 3 SOLUTION RESPIRATORY (INHALATION) at 08:03

## 2019-03-05 RX ADMIN — CLOPIDOGREL BISULFATE SCH MG: 75 TABLET ORAL at 08:42

## 2019-03-05 RX ADMIN — LISINOPRIL SCH MG: 20 TABLET ORAL at 08:41

## 2019-03-05 RX ADMIN — ASPIRIN SCH MG: 81 TABLET, COATED ORAL at 08:42

## 2019-03-05 RX ADMIN — IPRATROPIUM BROMIDE AND ALBUTEROL SULFATE SCH ML: .5; 3 SOLUTION RESPIRATORY (INHALATION) at 19:54

## 2019-03-05 RX ADMIN — SIMVASTATIN SCH MG: 40 TABLET, FILM COATED ORAL at 20:50

## 2019-03-05 RX ADMIN — Medication SCH CAP: at 08:40

## 2019-03-05 RX ADMIN — Medication SCH CAP: at 20:50

## 2019-03-05 NOTE — PDOC
PULMONARY PROGRESS NOTES


Subjective


LESS SOA OFF BIPAP


NO INCREASE COUGH


Vitals





Vital Signs








  Date Time  Temp Pulse Resp B/P (MAP) Pulse Ox O2 Delivery O2 Flow Rate FiO2


 


3/5/19 08:41  56  167/81    


 


3/5/19 08:03     98 Nasal Cannula 2.0 


 


3/5/19 07:00 97.7  22     





 97.7       








ROS:  No Nausea, No Chest Pain, No Abdominal Pain, No Increase Cough


General:  Alert


Lungs:  Clear, Crackles


Cardiovascular:  S1


Abdomen:  Soft


Neuro Exam:  Alert


Extremities:  No Edema


Skin:  Warm


Labs





Laboratory Tests








Test


 3/4/19


03:15


 


Platelet Count


 119 x10^3/uL


(140-400)








Medications





Active Scripts








 Medications  Dose


 Route/Sig


 Max Daily Dose Days Date Category


 


 Ibuprofen 400 Mg


 Tablet  400 Mg


 PO PRN Q6HRS PRN


   2/9/18 Reported


 


 Duoneb 0.5-3(2.5)


 Mg/3 Ml


  (Albuterol/Ipratropium)


 3 Ml Ampul.neb  3 Ml


 NEB QID


   2/9/18 Reported


 


 Aspir-Low


  (Aspirin) 81 Mg


 Tablet.dr  1 Tab


 PO DAILY


   4/21/17 Reported


 


 Ventolin Hfa


 Inhaler


  (Albuterol


 Sulfate) 18 Gm


 Hfa.aer.ad  


 


   4/21/17 Reported


 


 Losartan-Hctz


 50-12.5 Mg Tab


  (Losartan/Hydrochlorothiazide)


 1 Each Tablet  1 Tab


 PO DAILY


   4/21/17 Reported


 


 Simvastatin 40 Mg


 Tablet  40 Mg


 PO HS


   6/4/15 Reported


 


 Clopidogrel


  (Clopidogrel


 Bisulfate) 75 Mg


 Tablet  75 Mg


 PO DAILY


   6/4/15 Reported











Impression


.


IMPRESSION:


1.  Acute-on-chronic respiratory failure, multifactorial.


2.  Abnormal CT chest, revealing nodular infiltrates in the bases, suspect


infection, possibly Pseudomonas in a patient with underlying chronic obstructive


pulmonary disease.


3.  Acute exacerbation of chronic obstructive pulmonary disease.


4.  Elevated troponin per Cardiology.


5.  CT angiogram revealing no evidence of pulmonary embolism.


6.  Multiple comorbidities, as indicated above.


7.  Tobacco dependence, in remission.


8.  Peripheral vascular disease.





Plan


.


CHECK 6 M WALK AND NOCT DESAT


D/C PLANING


BETTER TODAY


PRN AND QHS BIPAP


AVOID SEDATING MED


STEROIDS


ANTIBX











ERNESTINA MIGUEL MD Mar 5, 2019 09:15

## 2019-03-05 NOTE — PDOC
CARDIO Progress Notes


Date and Time


Date of Service


3/5/2019


Time of Evaluation


1400





Subjective


Subjective:  No Chest Pain, No shortness of breath, No Palpitations





Vitals


Vitals





Vital Signs








  Date Time  Temp Pulse Resp B/P (MAP) Pulse Ox O2 Delivery O2 Flow Rate FiO2


 


3/5/19 11:00 97.4 49 20 146/74 (98) 96 Nasal Cannula 2.0 





 97.4       








Weight


Weight [ ]





Input and Output


Intake and Output











Intake and Output 


 


 3/5/19





 07:00


 


Intake Total 1830 ml


 


Output Total 1550 ml


 


Balance 280 ml


 


 


 


Intake Oral 1830 ml


 


Output Urine Total 1550 ml


 


# Voids 1


 


# Bowel Movements 1











Microbiology


Micro





Microbiology


2/28/19 Blood Culture - Preliminary, Resulted


          NO GROWTH AFTER 4 DAYS





Physical Exam


HEENT:  Neck Supple W Full Motion


Chest:  Symmetric


LUNGS:  Clear to Auscultation


Heart:  S1S2, RRR (SR/SB)


Abdomen:  Soft N/T


Extremities:  No Edema, No Calf Tenderness


Neurology:  alert, oriented, follow commands





Assessment


Assessment


1. Acute on chronic respiratory failure with AECOPD


2. Elevated troponin: type 2 due to hypoxia, peaked at 0.6. EF and WM nml. 


3. CAD; mild disease per recent C


4. Asymptomatic SB: no pauses or blocks. Lowest at 38 mainly while asleep. 


5. HTN: labile periods. 


6. HLP





Recommendations


1. Continue with secondary prevention measures. If BP remains labile then could 

intensify ACEi and change HCTZ to chlorthalidone. 


2. Continue with ASA and plavix for now and will reeval as outpt. No AV yadira 

blocking agents


3. Follow up in office in 4 weeks. Will arrange for outpt event monitor.











JARAD JONES Mar 5, 2019 14:25

## 2019-03-05 NOTE — NUR
SS following up with discharge planning. PT recommended skilled nursing unit. SS met with pt 
and spouse in room to discuss skilled nursing unit. Pt and pt's spouse declined skilled 
nursing unit. Pt reported that he wanted to go home. SS discussed home healthcare options 
with pt and spouse. Pt and spouse agreeable to home healthcare at discharge and reported 
having no preference of company. Pt's RN notified. SS will await discharge orders for home 
healthcare and will proceed accordingly with discharge planning.

## 2019-03-05 NOTE — DISCH
DISCHARGE INSTRUCTIONS


Condition on Discharge


Condition on Discharge:  Stable





Activity After Discharge


Activity Instructions for Disc:  Activity as tolerated


Bathing Instructions:  No Tub Bath until see 


Lifting Instructions after Dis:  No heavy lifting, No pulling or pushing, Do 

not lift >10 pounds


Exercise Instruction after Dis:  Progress as tolerated


Driving Instructions after Dis:  Do not drive


Weight Bearing Status after Di:  As tolerated





Diet after Discharge


Diet after Discharge:  Cardiac


Diet Texture:  Regular





Wound Incision Care


Wound/Incision Care:  May get incision wet





Checks after Discharge


Checks after discharge:  Check your Temp as needed





Contacting the  after DC


Call your doctor for:  Concerns you may have





Follow-Up


Follow up with:  dr steward 1 week





Treatment/Equipment after DC


Adaptive Equipment Issued:  None











GUS CHERY MD Mar 5, 2019 08:14

## 2019-03-06 VITALS — SYSTOLIC BLOOD PRESSURE: 147 MMHG | DIASTOLIC BLOOD PRESSURE: 83 MMHG

## 2019-03-06 VITALS — SYSTOLIC BLOOD PRESSURE: 109 MMHG | DIASTOLIC BLOOD PRESSURE: 76 MMHG

## 2019-03-06 VITALS — DIASTOLIC BLOOD PRESSURE: 82 MMHG | SYSTOLIC BLOOD PRESSURE: 154 MMHG

## 2019-03-06 LAB
ANION GAP SERPL CALC-SCNC: 4 MMOL/L (ref 6–14)
BUN SERPL-MCNC: 27 MG/DL (ref 8–26)
CALCIUM SERPL-MCNC: 8.7 MG/DL (ref 8.5–10.1)
CHLORIDE SERPL-SCNC: 104 MMOL/L (ref 98–107)
CHOLEST SERPL-MCNC: 146 MG/DL (ref 0–200)
CHOLEST/HDLC SERPL: 2.5 {RATIO}
CO2 SERPL-SCNC: 36 MMOL/L (ref 21–32)
CREAT SERPL-MCNC: 0.8 MG/DL (ref 0.7–1.3)
GFR SERPLBLD BASED ON 1.73 SQ M-ARVRAT: 95.8 ML/MIN
GLUCOSE SERPL-MCNC: 99 MG/DL (ref 70–99)
HDLC SERPL-MCNC: 58 MG/DL (ref 40–60)
LDLC: 70 MG/DL (ref 0–100)
MAGNESIUM SERPL-MCNC: 2.7 MG/DL (ref 1.8–2.4)
POTASSIUM SERPL-SCNC: 3.5 MMOL/L (ref 3.5–5.1)
SODIUM SERPL-SCNC: 144 MMOL/L (ref 136–145)
TRIGL SERPL-MCNC: 88 MG/DL (ref 0–150)
VLDLC: 18 MG/DL (ref 0–40)

## 2019-03-06 RX ADMIN — IPRATROPIUM BROMIDE AND ALBUTEROL SULFATE SCH ML: .5; 3 SOLUTION RESPIRATORY (INHALATION) at 07:55

## 2019-03-06 RX ADMIN — LISINOPRIL SCH MG: 20 TABLET ORAL at 08:32

## 2019-03-06 RX ADMIN — PANTOPRAZOLE SODIUM SCH MG: 40 TABLET, DELAYED RELEASE ORAL at 08:31

## 2019-03-06 RX ADMIN — ASPIRIN SCH MG: 81 TABLET, COATED ORAL at 08:31

## 2019-03-06 RX ADMIN — Medication SCH CAP: at 08:31

## 2019-03-06 RX ADMIN — IPRATROPIUM BROMIDE AND ALBUTEROL SULFATE SCH ML: .5; 3 SOLUTION RESPIRATORY (INHALATION) at 11:54

## 2019-03-06 RX ADMIN — CLOPIDOGREL BISULFATE SCH MG: 75 TABLET ORAL at 08:31

## 2019-03-06 NOTE — PDOC
CARDIO Progress Notes


Date and Time


Date of Service


3/6/2019


Time of Evaluation


1140





Subjective


Subjective:  No Chest Pain, No shortness of breath, No Palpitations





Vitals


Vitals





Vital Signs








  Date Time  Temp Pulse Resp B/P (MAP) Pulse Ox O2 Delivery O2 Flow Rate FiO2


 


3/6/19 11:00 98.0 66 18 109/76 (87) 89 Room Air  





 98.0       


 


3/6/19 08:00       3.0 








Weight


Weight [ ]





Input and Output


Intake and Output











Intake and Output 


 


 3/6/19





 07:00


 


Intake Total 350 ml


 


Output Total 1350 ml


 


Balance -1000 ml


 


 


 


Intake Oral 350 ml


 


Output Urine Total 1350 ml











Laboratory


Labs





Laboratory Tests








Test


 3/6/19


04:30


 


Sodium Level


 144 mmol/L


(136-145)


 


Potassium Level


 3.5 mmol/L


(3.5-5.1)


 


Chloride Level


 104 mmol/L


()


 


Carbon Dioxide Level


 36 mmol/L


(21-32)


 


Anion Gap 4 (6-14) 


 


Blood Urea Nitrogen


 27 mg/dL


(8-26)


 


Creatinine


 0.8 mg/dL


(0.7-1.3)


 


Estimated GFR


(Cockcroft-Gault) 95.8 





 


Glucose Level


 99 mg/dL


(70-99)


 


Calcium Level


 8.7 mg/dL


(8.5-10.1)


 


Magnesium Level


 2.7 mg/dL


(1.8-2.4)


 


Triglycerides Level


 88 mg/dL


(0-150)


 


Cholesterol Level


 146 mg/dL


(0-200)


 


LDL Cholesterol, Calculated


 70 mg/dL


(0-100)


 


VLDL Cholesterol, Calculated


 18 mg/dL


(0-40)


 


Non-HDL Cholesterol Calculated


 88 mg/dL


(0-129)


 


HDL Cholesterol


 58 mg/dL


(40-60)


 


Cholesterol/HDL Ratio 2.5 











Microbiology


Micro





Microbiology


2/28/19 Blood Culture - Final, Complete


          NO GROWTH AFTER 5 DAYS





Physical Exam


HEENT:  Neck Supple W Full Motion


Chest:  Symmetric


LUNGS:  Other (basilar wheeze)


Heart:  S1S2, RRR (SR/SB)


Abdomen:  Soft N/T


Extremities:  No Edema, No Calf Tenderness


Neurology:  alert, oriented, follow commands





Assessment


Assessment


1. Acute on chronic respiratory failure with AECOPD


2. Elevated troponin: type 2 due to hypoxia, peaked at 0.6. EF and WM nml. 


3. CAD; mild disease per recent C


4. Asymptomatic SB: no pauses or blocks. Lowest at 38 mainly while asleep. x1 

brief burst of SVT


5. HTN: better controlled


6. HLP





Recommendations


1. Continue with secondary prevention measures. Continue with lisinopril HCTZ 

combo


2. Continue with ASA and plavix for now and will reeval as outpt. No AV yadira 

blocking agents


3. Follow up in office in 4 weeks. Will arrange for outpt event monitor.











JARAD JONES Mar 6, 2019 12:20

## 2019-03-06 NOTE — PDOC
PULMONARY PROGRESS NOTES


Subjective


LESS SOA OFF BIPAP


NO INCREASE COUGH


Vitals





Vital Signs








  Date Time  Temp Pulse Resp B/P (MAP) Pulse Ox O2 Delivery O2 Flow Rate FiO2


 


3/6/19 08:32  61  147/83    


 


3/6/19 08:00      Nasal Cannula 3.0 


 


3/6/19 07:57     94   


 


3/6/19 07:00 97.7  18     





 97.7       








ROS:  No Nausea, No Chest Pain, No Abdominal Pain, No Increase Cough


General:  Alert


Lungs:  Clear, Crackles


Cardiovascular:  S1


Abdomen:  Soft


Neuro Exam:  Alert


Extremities:  No Edema


Skin:  Warm


Labs





Laboratory Tests








Test


 3/6/19


04:30


 


Sodium Level


 144 mmol/L


(136-145)


 


Potassium Level


 3.5 mmol/L


(3.5-5.1)


 


Chloride Level


 104 mmol/L


()


 


Carbon Dioxide Level


 36 mmol/L


(21-32)


 


Anion Gap 4 (6-14) 


 


Blood Urea Nitrogen


 27 mg/dL


(8-26)


 


Creatinine


 0.8 mg/dL


(0.7-1.3)


 


Estimated GFR


(Cockcroft-Gault) 95.8 





 


Glucose Level


 99 mg/dL


(70-99)


 


Calcium Level


 8.7 mg/dL


(8.5-10.1)


 


Magnesium Level


 2.7 mg/dL


(1.8-2.4)


 


Triglycerides Level


 88 mg/dL


(0-150)


 


Cholesterol Level


 146 mg/dL


(0-200)


 


LDL Cholesterol, Calculated


 70 mg/dL


(0-100)


 


VLDL Cholesterol, Calculated


 18 mg/dL


(0-40)


 


Non-HDL Cholesterol Calculated


 88 mg/dL


(0-129)


 


HDL Cholesterol


 58 mg/dL


(40-60)


 


Cholesterol/HDL Ratio 2.5 








Laboratory Tests








Test


 3/6/19


04:30


 


Sodium Level


 144 mmol/L


(136-145)


 


Potassium Level


 3.5 mmol/L


(3.5-5.1)


 


Chloride Level


 104 mmol/L


()


 


Carbon Dioxide Level


 36 mmol/L


(21-32)


 


Anion Gap 4 (6-14) 


 


Blood Urea Nitrogen


 27 mg/dL


(8-26)


 


Creatinine


 0.8 mg/dL


(0.7-1.3)


 


Estimated GFR


(Cockcroft-Gault) 95.8 





 


Glucose Level


 99 mg/dL


(70-99)


 


Calcium Level


 8.7 mg/dL


(8.5-10.1)


 


Magnesium Level


 2.7 mg/dL


(1.8-2.4)


 


Triglycerides Level


 88 mg/dL


(0-150)


 


Cholesterol Level


 146 mg/dL


(0-200)


 


LDL Cholesterol, Calculated


 70 mg/dL


(0-100)


 


VLDL Cholesterol, Calculated


 18 mg/dL


(0-40)


 


Non-HDL Cholesterol Calculated


 88 mg/dL


(0-129)


 


HDL Cholesterol


 58 mg/dL


(40-60)


 


Cholesterol/HDL Ratio 2.5 








Medications





Active Scripts








 Medications  Dose


 Route/Sig


 Max Daily Dose Days Date Category


 


 Ibuprofen 400 Mg


 Tablet  400 Mg


 PO PRN Q6HRS PRN


   2/9/18 Reported


 


 Duoneb 0.5-3(2.5)


 Mg/3 Ml


  (Albuterol/Ipratropium)


 3 Ml Ampul.neb  3 Ml


 NEB QID


   2/9/18 Reported


 


 Aspir-Low


  (Aspirin) 81 Mg


 Tablet.dr  1 Tab


 PO DAILY


   4/21/17 Reported


 


 Ventolin Hfa


 Inhaler


  (Albuterol


 Sulfate) 18 Gm


 Hfa.aer.ad  


 


   4/21/17 Reported


 


 Losartan-Hctz


 50-12.5 Mg Tab


  (Losartan/Hydrochlorothiazide)


 1 Each Tablet  1 Tab


 PO DAILY


   4/21/17 Reported


 


 Simvastatin 40 Mg


 Tablet  40 Mg


 PO HS


   6/4/15 Reported


 


 Clopidogrel


  (Clopidogrel


 Bisulfate) 75 Mg


 Tablet  75 Mg


 PO DAILY


   6/4/15 Reported











Impression


.


IMPRESSION:


1.  Acute-on-chronic respiratory failure, multifactorial.


2.  Abnormal CT chest, revealing nodular infiltrates in the bases, suspect


infection, possibly Pseudomonas in a patient with underlying chronic obstructive


pulmonary disease.


3.  Acute exacerbation of chronic obstructive pulmonary disease.


4.  Elevated troponin per Cardiology.


5.  CT angiogram revealing no evidence of pulmonary embolism.


6.  Multiple comorbidities, as indicated above.


7.  Tobacco dependence, in remission.


8.  Peripheral vascular disease.





Plan


.


CHECK 6 M WALK AND NOCT DESAT


D/C PLANING


BETTER TODAY


PRN AND QHS BIPAP


AVOID SEDATING MED


STEROIDS


BOBOX











ERNESTINA MIGUEL MD Mar 6, 2019 10:10

## 2019-03-06 NOTE — NUR
Discharge Note:



MARGOT REHMAN



Discharge instructions and discharge home medications reviewed with Patient and a copy 
given. All questions have been answered and understanding verbalized. 



The following instructions and handouts were given: Fellow up with Dr. Forman in 4 to 6 
weeks.



Fellow up with Dr. Liu on 4/4/19 at 0830.

Discontinued IV lines, skin intact.



Patient discharged to  home with wheelchair]

## 2019-03-06 NOTE — NUR
SS following up with discharge planning. SS phoned and faxed referral and discharge orders 
for home healthcare to [facility], [phone]; fax [phone]. Pt's RN 
notified.

## 2019-03-06 NOTE — DS
DATE OF DISCHARGE:  



HOSPITAL SUMMARY:  The patient is a 68-year-old white male with oxygen dependent

COPD who still smokes at times and came in with respiratory failure, hypercapnia

and respiratory acidosis.  His pH was 7.22 on admission with a pCO2 around 90

and came down to his baseline of pCO2 of 50-60 and a pH of 7.32 with treatment. 

CBC showed a mildly low platelet count of 119,000, but stable and the chemistry

was unremarkable with mildly elevated troponin consistent with demand ischemia. 

Cholesterol was low at 146, LDL 70.  Chest x-ray was clear and chest CTA did not

show any evidence of pulmonary emboli.  Significant COPD was seen as well.  He

was on BiPAP in the ICU and upon the floor and slowly progressed to where he was

able to take oral meds and is comfortable to be followed as an outpatient at

this point.



FINAL DIAGNOSES:

1.  Oxygen dependent chronic obstructive pulmonary disease with severe acute

exacerbation, respiratory acidosis secondary to hypercapnia.

2.  Demand ischemia secondary to severe respiratory acidosis.

3.  Chronic tobacco abuse.

4.  Mild thrombocytopenia.



OPERATIONS, PROCEDURES, AND COMPLICATIONS:  None.



CONSULTATIONS:

1.  Bobo Forman MD.

2.  Abbe Person MD.



DISPOSITION:  Home meds remain the same.  Five more days of Levaquin and then a

taper prednisone schedule and he will see his pulmonologist at [facility]

in 2 weeks.  We will follow up in our office with repeat platelet count to be

sure the thrombocytopenia was transient as it was not preexisting when he came

to the hospital.  Continued tobacco avoidance encouraged.  Prognosis is poor

because of the severity of disease and social issues.

 



______________________________

GUS CHERY MD



DR:  CARMENCITA/skylar  JOB#:  [id number] / [id number]

DD:  03/06/2019 08:07  DT:  03/06/2019 15:05

## 2019-03-08 NOTE — RESP
DATE OF SERVICE:  03/04/2019



ATTENDING PHYSICIAN:  Dr. Dave Carmona.



The patient underwent nocturnal desaturation study on room air on 03/04/2019. 

There was no significant periods of oxyhemoglobin desaturation below 88%.



IMPRESSION:  No evidence of desaturation below 88%.



PLAN:  Polysomnogram if clinically warranted.

 



______________________________

ERNESTINA MIGUEL MD



DR:  MABEL/nts  JOB#:  [id number] / [id number]

DD:  03/08/2019 14:44  DT:  03/08/2019 14:51

## 2019-05-05 ENCOUNTER — HOSPITAL ENCOUNTER (INPATIENT)
Dept: HOSPITAL 61 - ER | Age: 69
LOS: 27 days | Discharge: HOSPICE | DRG: 870 | End: 2019-06-01
Attending: FAMILY MEDICINE | Admitting: FAMILY MEDICINE
Payer: COMMERCIAL

## 2019-05-05 VITALS — DIASTOLIC BLOOD PRESSURE: 69 MMHG | SYSTOLIC BLOOD PRESSURE: 118 MMHG

## 2019-05-05 VITALS — SYSTOLIC BLOOD PRESSURE: 105 MMHG | DIASTOLIC BLOOD PRESSURE: 62 MMHG

## 2019-05-05 VITALS — SYSTOLIC BLOOD PRESSURE: 92 MMHG | DIASTOLIC BLOOD PRESSURE: 73 MMHG

## 2019-05-05 VITALS — DIASTOLIC BLOOD PRESSURE: 49 MMHG | SYSTOLIC BLOOD PRESSURE: 120 MMHG

## 2019-05-05 VITALS — SYSTOLIC BLOOD PRESSURE: 109 MMHG | DIASTOLIC BLOOD PRESSURE: 68 MMHG

## 2019-05-05 VITALS — SYSTOLIC BLOOD PRESSURE: 169 MMHG | DIASTOLIC BLOOD PRESSURE: 75 MMHG

## 2019-05-05 VITALS — SYSTOLIC BLOOD PRESSURE: 147 MMHG | DIASTOLIC BLOOD PRESSURE: 69 MMHG

## 2019-05-05 VITALS — DIASTOLIC BLOOD PRESSURE: 70 MMHG | SYSTOLIC BLOOD PRESSURE: 128 MMHG

## 2019-05-05 VITALS — DIASTOLIC BLOOD PRESSURE: 60 MMHG | SYSTOLIC BLOOD PRESSURE: 103 MMHG

## 2019-05-05 VITALS — SYSTOLIC BLOOD PRESSURE: 157 MMHG | DIASTOLIC BLOOD PRESSURE: 70 MMHG

## 2019-05-05 VITALS — SYSTOLIC BLOOD PRESSURE: 154 MMHG | DIASTOLIC BLOOD PRESSURE: 66 MMHG

## 2019-05-05 VITALS — SYSTOLIC BLOOD PRESSURE: 118 MMHG | DIASTOLIC BLOOD PRESSURE: 70 MMHG

## 2019-05-05 VITALS — DIASTOLIC BLOOD PRESSURE: 61 MMHG | SYSTOLIC BLOOD PRESSURE: 155 MMHG

## 2019-05-05 VITALS — DIASTOLIC BLOOD PRESSURE: 77 MMHG | SYSTOLIC BLOOD PRESSURE: 154 MMHG

## 2019-05-05 VITALS — DIASTOLIC BLOOD PRESSURE: 67 MMHG | SYSTOLIC BLOOD PRESSURE: 169 MMHG

## 2019-05-05 VITALS — DIASTOLIC BLOOD PRESSURE: 55 MMHG | SYSTOLIC BLOOD PRESSURE: 74 MMHG

## 2019-05-05 VITALS — DIASTOLIC BLOOD PRESSURE: 59 MMHG | SYSTOLIC BLOOD PRESSURE: 127 MMHG

## 2019-05-05 VITALS — DIASTOLIC BLOOD PRESSURE: 66 MMHG | SYSTOLIC BLOOD PRESSURE: 125 MMHG

## 2019-05-05 VITALS — SYSTOLIC BLOOD PRESSURE: 142 MMHG | DIASTOLIC BLOOD PRESSURE: 59 MMHG

## 2019-05-05 VITALS — DIASTOLIC BLOOD PRESSURE: 66 MMHG | SYSTOLIC BLOOD PRESSURE: 117 MMHG

## 2019-05-05 VITALS — DIASTOLIC BLOOD PRESSURE: 52 MMHG | SYSTOLIC BLOOD PRESSURE: 82 MMHG

## 2019-05-05 VITALS — SYSTOLIC BLOOD PRESSURE: 141 MMHG | DIASTOLIC BLOOD PRESSURE: 71 MMHG

## 2019-05-05 VITALS — SYSTOLIC BLOOD PRESSURE: 122 MMHG | DIASTOLIC BLOOD PRESSURE: 70 MMHG

## 2019-05-05 VITALS — DIASTOLIC BLOOD PRESSURE: 46 MMHG | SYSTOLIC BLOOD PRESSURE: 67 MMHG

## 2019-05-05 VITALS — DIASTOLIC BLOOD PRESSURE: 63 MMHG | SYSTOLIC BLOOD PRESSURE: 93 MMHG

## 2019-05-05 VITALS — DIASTOLIC BLOOD PRESSURE: 65 MMHG | SYSTOLIC BLOOD PRESSURE: 113 MMHG

## 2019-05-05 VITALS — DIASTOLIC BLOOD PRESSURE: 59 MMHG | SYSTOLIC BLOOD PRESSURE: 143 MMHG

## 2019-05-05 VITALS — SYSTOLIC BLOOD PRESSURE: 195 MMHG | DIASTOLIC BLOOD PRESSURE: 77 MMHG

## 2019-05-05 VITALS — WEIGHT: 163.44 LBS | BODY MASS INDEX: 22.14 KG/M2 | HEIGHT: 72 IN

## 2019-05-05 VITALS — DIASTOLIC BLOOD PRESSURE: 52 MMHG | SYSTOLIC BLOOD PRESSURE: 64 MMHG

## 2019-05-05 VITALS — DIASTOLIC BLOOD PRESSURE: 56 MMHG | SYSTOLIC BLOOD PRESSURE: 148 MMHG

## 2019-05-05 DIAGNOSIS — E43: ICD-10-CM

## 2019-05-05 DIAGNOSIS — Z99.81: ICD-10-CM

## 2019-05-05 DIAGNOSIS — I25.10: ICD-10-CM

## 2019-05-05 DIAGNOSIS — Z79.01: ICD-10-CM

## 2019-05-05 DIAGNOSIS — J43.9: ICD-10-CM

## 2019-05-05 DIAGNOSIS — R65.21: ICD-10-CM

## 2019-05-05 DIAGNOSIS — Z90.49: ICD-10-CM

## 2019-05-05 DIAGNOSIS — G93.40: ICD-10-CM

## 2019-05-05 DIAGNOSIS — Z86.73: ICD-10-CM

## 2019-05-05 DIAGNOSIS — K21.0: ICD-10-CM

## 2019-05-05 DIAGNOSIS — R13.12: ICD-10-CM

## 2019-05-05 DIAGNOSIS — Z79.899: ICD-10-CM

## 2019-05-05 DIAGNOSIS — N40.0: ICD-10-CM

## 2019-05-05 DIAGNOSIS — I10: ICD-10-CM

## 2019-05-05 DIAGNOSIS — J96.22: ICD-10-CM

## 2019-05-05 DIAGNOSIS — N28.9: ICD-10-CM

## 2019-05-05 DIAGNOSIS — J69.0: ICD-10-CM

## 2019-05-05 DIAGNOSIS — E78.00: ICD-10-CM

## 2019-05-05 DIAGNOSIS — Z79.82: ICD-10-CM

## 2019-05-05 DIAGNOSIS — Z66: ICD-10-CM

## 2019-05-05 DIAGNOSIS — E78.5: ICD-10-CM

## 2019-05-05 DIAGNOSIS — Z88.0: ICD-10-CM

## 2019-05-05 DIAGNOSIS — E86.0: ICD-10-CM

## 2019-05-05 DIAGNOSIS — Z86.718: ICD-10-CM

## 2019-05-05 DIAGNOSIS — Z88.8: ICD-10-CM

## 2019-05-05 DIAGNOSIS — F17.210: ICD-10-CM

## 2019-05-05 DIAGNOSIS — Z53.20: ICD-10-CM

## 2019-05-05 DIAGNOSIS — A41.9: Primary | ICD-10-CM

## 2019-05-05 DIAGNOSIS — J96.21: ICD-10-CM

## 2019-05-05 LAB
% ATYL: 1 % (ref 0–0)
% BANDS: 41 % (ref 0–9)
% LYMPHS: 3 % (ref 24–48)
% MONOS: 3 % (ref 0–10)
% SEGS: 52 % (ref 35–66)
ALBUMIN SERPL-MCNC: 2.9 G/DL (ref 3.4–5)
ALBUMIN/GLOB SERPL: 0.7 {RATIO} (ref 1–1.7)
ALP SERPL-CCNC: 63 U/L (ref 46–116)
ALT SERPL-CCNC: 23 U/L (ref 16–63)
ANION GAP SERPL CALC-SCNC: 7 MMOL/L (ref 6–14)
ANISOCYTOSIS BLD QL SMEAR: SLIGHT
APTT BLD: 35 SEC (ref 24–38)
APTT PPP: YELLOW S
AST SERPL-CCNC: 25 U/L (ref 15–37)
BACTERIA #/AREA URNS HPF: 0 /HPF
BASE EXCESS ABG: -1 MMOL/L (ref -3–3)
BASE EXCESS ABG: 0 MMOL/L (ref -3–3)
BASE EXCESS ABG: 2 MMOL/L (ref -3–3)
BASE EXCESS ABG: 2 MMOL/L (ref -3–3)
BASOPHILS # BLD AUTO: 0.1 X10^3/UL (ref 0–0.2)
BASOPHILS NFR BLD: 1 % (ref 0–3)
BILIRUB SERPL-MCNC: 0.2 MG/DL (ref 0.2–1)
BILIRUB UR QL STRIP: NEGATIVE
BUN SERPL-MCNC: 34 MG/DL (ref 8–26)
BUN/CREAT SERPL: 21 (ref 6–20)
CALCIUM SERPL-MCNC: 8.7 MG/DL (ref 8.5–10.1)
CHLORIDE SERPL-SCNC: 99 MMOL/L (ref 98–107)
CK SERPL-CCNC: 47 U/L (ref 39–308)
CO2 SERPL-SCNC: 33 MMOL/L (ref 21–32)
CREAT SERPL-MCNC: 1.6 MG/DL (ref 0.7–1.3)
D DIMER PPP FEU-MCNC: 2.36 UG/MLFEU (ref 0–0.5)
EOSINOPHIL NFR BLD: 0 % (ref 0–3)
EOSINOPHIL NFR BLD: 0 X10^3/UL (ref 0–0.7)
ERYTHROCYTE [DISTWIDTH] IN BLOOD BY AUTOMATED COUNT: 17.8 % (ref 11.5–14.5)
FIBRINOGEN PPP-MCNC: CLEAR MG/DL
GFR SERPLBLD BASED ON 1.73 SQ M-ARVRAT: 42.9 ML/MIN
GLOBULIN SER-MCNC: 4.3 G/DL (ref 2.2–3.8)
GLUCOSE SERPL-MCNC: 203 MG/DL (ref 70–99)
HCO3 BLDA-SCNC: 27 MMOL/L (ref 21–28)
HCO3 BLDA-SCNC: 29 MMOL/L (ref 21–28)
HCO3 BLDA-SCNC: 31 MMOL/L (ref 21–28)
HCO3 BLDA-SCNC: 32 MMOL/L (ref 21–28)
HCT VFR BLD CALC: 44.5 % (ref 39–53)
HGB BLD-MCNC: 14.3 G/DL (ref 13–17.5)
HYALINE CASTS #/AREA URNS LPF: (no result) /HPF
INSPIRATION SETTING TIME VENT: 36
INSPIRATION SETTING TIME VENT: 50
LYMPHOCYTES # BLD: 0.3 X10^3/UL (ref 1–4.8)
LYMPHOCYTES NFR BLD AUTO: 5 % (ref 24–48)
MAGNESIUM SERPL-MCNC: 2.4 MG/DL (ref 1.8–2.4)
MCH RBC QN AUTO: 28 PG (ref 25–35)
MCHC RBC AUTO-ENTMCNC: 32 G/DL (ref 31–37)
MCV RBC AUTO: 88 FL (ref 79–100)
MONO #: 0.2 X10^3/UL (ref 0–1.1)
MONOCYTES NFR BLD: 3 % (ref 0–9)
NEUT #: 5.9 X10^3UL (ref 1.8–7.7)
NEUTROPHILS NFR BLD AUTO: 91 % (ref 31–73)
NITRITE UR QL STRIP: NEGATIVE
OVALOCYTES BLD QL SMEAR: (no result)
PCO2 BLDA: 60 MMHG (ref 35–46)
PCO2 BLDA: 65 MMHG (ref 35–46)
PCO2 BLDA: 70 MMHG (ref 35–46)
PCO2 BLDA: 82 MMHG (ref 35–46)
PH UR STRIP: 6 [PH]
PLATELET # BLD AUTO: 122 X10^3/UL (ref 140–400)
PLATELET # BLD EST: (no result) 10*3/UL
PO2 BLDA: 49 MMHG (ref 65–108)
PO2 BLDA: 68 MMHG (ref 65–108)
PO2 BLDA: 90 MMHG (ref 65–108)
PO2 BLDA: 91 MMHG (ref 65–108)
POTASSIUM SERPL-SCNC: 3.9 MMOL/L (ref 3.5–5.1)
PROT SERPL-MCNC: 7.2 G/DL (ref 6.4–8.2)
PROT UR STRIP-MCNC: 30 MG/DL
PROTHROMBIN TIME: 13.1 SEC (ref 11.7–14)
RBC # BLD AUTO: 5.08 X10^6/UL (ref 4.3–5.7)
RBC #/AREA URNS HPF: (no result) /HPF (ref 0–2)
SAO2 % BLDA: 81 % (ref 92–99)
SAO2 % BLDA: 91 % (ref 92–99)
SAO2 % BLDA: 96 % (ref 92–99)
SAO2 % BLDA: 97 % (ref 92–99)
SODIUM SERPL-SCNC: 139 MMOL/L (ref 136–145)
SQUAMOUS #/AREA URNS LPF: (no result) /LPF
UROBILINOGEN UR-MCNC: 0.2 MG/DL
WBC # BLD AUTO: 6.5 X10^3/UL (ref 4–11)
WBC #/AREA URNS HPF: (no result) /HPF (ref 0–4)

## 2019-05-05 PROCEDURE — 71275 CT ANGIOGRAPHY CHEST: CPT

## 2019-05-05 PROCEDURE — 88175 CYTOPATH C/V AUTO FLUID REDO: CPT

## 2019-05-05 PROCEDURE — 71045 X-RAY EXAM CHEST 1 VIEW: CPT

## 2019-05-05 PROCEDURE — 87102 FUNGUS ISOLATION CULTURE: CPT

## 2019-05-05 PROCEDURE — 74018 RADEX ABDOMEN 1 VIEW: CPT

## 2019-05-05 PROCEDURE — 83880 ASSAY OF NATRIURETIC PEPTIDE: CPT

## 2019-05-05 PROCEDURE — 96375 TX/PRO/DX INJ NEW DRUG ADDON: CPT

## 2019-05-05 PROCEDURE — 85027 COMPLETE CBC AUTOMATED: CPT

## 2019-05-05 PROCEDURE — 94003 VENT MGMT INPAT SUBQ DAY: CPT

## 2019-05-05 PROCEDURE — 81001 URINALYSIS AUTO W/SCOPE: CPT

## 2019-05-05 PROCEDURE — 85610 PROTHROMBIN TIME: CPT

## 2019-05-05 PROCEDURE — 85007 BL SMEAR W/DIFF WBC COUNT: CPT

## 2019-05-05 PROCEDURE — 94760 N-INVAS EAR/PLS OXIMETRY 1: CPT

## 2019-05-05 PROCEDURE — 94640 AIRWAY INHALATION TREATMENT: CPT

## 2019-05-05 PROCEDURE — 83735 ASSAY OF MAGNESIUM: CPT

## 2019-05-05 PROCEDURE — 99291 CRITICAL CARE FIRST HOUR: CPT

## 2019-05-05 PROCEDURE — 84484 ASSAY OF TROPONIN QUANT: CPT

## 2019-05-05 PROCEDURE — 80048 BASIC METABOLIC PNL TOTAL CA: CPT

## 2019-05-05 PROCEDURE — 93005 ELECTROCARDIOGRAM TRACING: CPT

## 2019-05-05 PROCEDURE — 85379 FIBRIN DEGRADATION QUANT: CPT

## 2019-05-05 PROCEDURE — 36600 WITHDRAWAL OF ARTERIAL BLOOD: CPT

## 2019-05-05 PROCEDURE — 87040 BLOOD CULTURE FOR BACTERIA: CPT

## 2019-05-05 PROCEDURE — 74230 X-RAY XM SWLNG FUNCJ C+: CPT

## 2019-05-05 PROCEDURE — 96365 THER/PROPH/DIAG IV INF INIT: CPT

## 2019-05-05 PROCEDURE — 31720 CLEARANCE OF AIRWAYS: CPT

## 2019-05-05 PROCEDURE — 87116 MYCOBACTERIA CULTURE: CPT

## 2019-05-05 PROCEDURE — 80053 COMPREHEN METABOLIC PANEL: CPT

## 2019-05-05 PROCEDURE — 84145 PROCALCITONIN (PCT): CPT

## 2019-05-05 PROCEDURE — 82805 BLOOD GASES W/O2 SATURATION: CPT

## 2019-05-05 PROCEDURE — 31622 DX BRONCHOSCOPE/WASH: CPT

## 2019-05-05 PROCEDURE — 87070 CULTURE OTHR SPECIMN AEROBIC: CPT

## 2019-05-05 PROCEDURE — 87205 SMEAR GRAM STAIN: CPT

## 2019-05-05 PROCEDURE — 5A1955Z RESPIRATORY VENTILATION, GREATER THAN 96 CONSECUTIVE HOURS: ICD-10-PCS | Performed by: FAMILY MEDICINE

## 2019-05-05 PROCEDURE — 43246 EGD PLACE GASTROSTOMY TUBE: CPT

## 2019-05-05 PROCEDURE — 5A09357 ASSISTANCE WITH RESPIRATORY VENTILATION, LESS THAN 24 CONSECUTIVE HOURS, CONTINUOUS POSITIVE AIRWAY PRESSURE: ICD-10-PCS | Performed by: FAMILY MEDICINE

## 2019-05-05 PROCEDURE — 93970 EXTREMITY STUDY: CPT

## 2019-05-05 PROCEDURE — 94660 CPAP INITIATION&MGMT: CPT

## 2019-05-05 PROCEDURE — 82550 ASSAY OF CK (CPK): CPT

## 2019-05-05 PROCEDURE — 0BH17EZ INSERTION OF ENDOTRACHEAL AIRWAY INTO TRACHEA, VIA NATURAL OR ARTIFICIAL OPENING: ICD-10-PCS | Performed by: FAMILY MEDICINE

## 2019-05-05 PROCEDURE — 85730 THROMBOPLASTIN TIME PARTIAL: CPT

## 2019-05-05 PROCEDURE — 85025 COMPLETE CBC W/AUTO DIFF WBC: CPT

## 2019-05-05 PROCEDURE — 82962 GLUCOSE BLOOD TEST: CPT

## 2019-05-05 PROCEDURE — 94002 VENT MGMT INPAT INIT DAY: CPT

## 2019-05-05 PROCEDURE — 36415 COLL VENOUS BLD VENIPUNCTURE: CPT

## 2019-05-05 PROCEDURE — 83605 ASSAY OF LACTIC ACID: CPT

## 2019-05-05 RX ADMIN — IPRATROPIUM BROMIDE AND ALBUTEROL SULFATE SCH ML: .5; 3 SOLUTION RESPIRATORY (INHALATION) at 11:59

## 2019-05-05 RX ADMIN — BACITRACIN SCH MLS/HR: 5000 INJECTION, POWDER, FOR SOLUTION INTRAMUSCULAR at 19:00

## 2019-05-05 RX ADMIN — IPRATROPIUM BROMIDE AND ALBUTEROL SULFATE SCH ML: .5; 3 SOLUTION RESPIRATORY (INHALATION) at 20:13

## 2019-05-05 RX ADMIN — BACITRACIN SCH MLS/HR: 5000 INJECTION, POWDER, FOR SOLUTION INTRAMUSCULAR at 19:07

## 2019-05-05 RX ADMIN — METHYLPREDNISOLONE SODIUM SUCCINATE SCH MG: 40 INJECTION, POWDER, FOR SOLUTION INTRAMUSCULAR; INTRAVENOUS at 18:59

## 2019-05-05 RX ADMIN — METHYLPREDNISOLONE SODIUM SUCCINATE SCH MG: 40 INJECTION, POWDER, FOR SOLUTION INTRAMUSCULAR; INTRAVENOUS at 21:50

## 2019-05-05 RX ADMIN — IPRATROPIUM BROMIDE AND ALBUTEROL SULFATE SCH ML: .5; 3 SOLUTION RESPIRATORY (INHALATION) at 16:26

## 2019-05-05 RX ADMIN — SIMVASTATIN SCH MG: 40 TABLET, FILM COATED ORAL at 21:13

## 2019-05-05 RX ADMIN — LOSARTAN POTASSIUM SCH MG: 50 TABLET ORAL at 12:00

## 2019-05-05 RX ADMIN — CLOPIDOGREL BISULFATE SCH MG: 75 TABLET ORAL at 18:59

## 2019-05-05 RX ADMIN — AZITHROMYCIN MONOHYDRATE SCH MLS/HR: 500 INJECTION, POWDER, LYOPHILIZED, FOR SOLUTION INTRAVENOUS at 12:00

## 2019-05-05 RX ADMIN — BACITRACIN SCH MLS/HR: 5000 INJECTION, POWDER, FOR SOLUTION INTRAMUSCULAR at 21:51

## 2019-05-05 NOTE — RAD
EXAM: Abdomen, single view; chest, single view.

 

HISTORY: Endotracheal tube placement.

 

COMPARISON: 5/5/2019.

 

FINDINGS: Frontal views of the abdomen and pelvis are obtained. There is 

an endotracheal tube within the mid trachea. There is a nasogastric tube 

within the stomach. There is emphysema with suspected lower lobe 

atelectasis, interstitial infiltrate or chronic interstitial changes. 

There is no pleural effusion. The heart is normal in size. There is 

nonspecific air-filled bowel within the upper abdomen.

 

IMPRESSION:

1. Endotracheal tube and nasogastric tube in expected position.

2. Emphysema with suspected lower lobe atelectasis, interstitial 

infiltrate or chronic interstitial changes.

 

Electronically signed by: Kamila Spivey MD (5/5/2019 3:09 PM) [facility]

## 2019-05-05 NOTE — EKG
[facility]

              [address] [place] [address]

Test Date:    2019               Test Time:    07:15:51

Pat Name:     MARGOT REHMAN            Department:   

Patient ID:   [id number]           Room:         [facility]

Gender:       M                        Technician:   

:          1950               Requested By: LILLIANA FITZGERALD

Order Number: [id number]           Reading MD:   Abbe Liu

                                 Measurements

Intervals                              Axis          

Rate:         120                      P:            90

IL:           114                      QRS:          83

QRSD:         120                      T:            54

QT:           324                                    

QTc:          463                                    

                           Interpretive Statements

SINUS TACHYCARDIA

S1,S2,S3 PATTERN

INCOMPLETE RIGHT BUNDLE BRANCH BLOCK

CONSIDER RIGHT VENTRICULAR HYPERTROPHY

NONSPECIFIC ST-T WAVE CHANGES.



Electronically Signed On 2019 15:58:17 CDT by Abbe Liu

## 2019-05-05 NOTE — PDOC
GENERAL


General:


see dictated H&P.





VITAL SIGNS


Vital Signs:





Vital Signs








  Date Time  Temp Pulse Resp B/P (MAP) Pulse Ox O2 Delivery O2 Flow Rate FiO2


 


5/5/19 08:11  102 18 116/66 (83) 93 BiPAP/CPAP  


 


5/5/19 07:06 98.2      4.0 





 98.2       











ALLERGIES


Allergies:





Allergies








Coded Allergies Type Severity Reaction Last Updated Verified


 


  Penicillins Allergy Severe Shortness of Air 1/15/14 Yes


 


  codeine Allergy Severe Shortness of Air 1/15/14 Yes











MEDS


Medications:





Current Medications








 Medications


  (Trade)  Dose


 Ordered  Sig/Tin  Start Time


 Stop Time Status Last Admin


Dose Admin


 


 Albuterol/


 Ipratropium


  (Duoneb)  3 ml  1X  ONCE  5/5/19 07:15


 5/5/19 07:21 DC 5/5/19 07:49


3 ML


 


 Ceftriaxone Sodium


  (Rocephin)  1 gm  1X  ONCE  5/5/19 07:45


 5/5/19 07:48 DC 5/5/19 08:01


1 GM


 


 Methylprednisolone


 Sodium Succinate


  (SOLU-Medrol


 125MG VIAL)  125 mg  1X  ONCE  5/5/19 07:15


 5/5/19 07:21 DC 5/5/19 07:40


125 MG


 


 Sodium Chloride  1,000 ml @ 


 150 mls/hr  Q6H40M  5/5/19 08:25


 5/6/19 08:24   





 


 Vancomycin HCl  250 ml @ 


 250 mls/hr  1X  ONCE  5/5/19 07:45


 5/5/19 08:44 UNV  





 


 Vancomycin HCl 2


 gm/Sodium Chloride  500 ml @ 


 250 mls/hr  1X  ONCE  5/5/19 08:00


 5/5/19 09:59 DC 5/5/19 08:00


250 MLS/HR











LAB


Lab:





Laboratory Tests








Test


 5/5/19


07:20 5/5/19


07:30


 


O2 Saturation 81 % (92-99)  


 


Arterial Blood pH


 7.26


(7.35-7.45) 





 


Arterial Blood pCO2 at


Patient Temp 70 mmHg


(35-46) 





 


Arterial Blood pO2 at Patient


Temp 49 mmHg


() 





 


Arterial Blood HCO3


 31 mmol/L


(21-28) 





 


Arterial Blood Base Excess


 2 mmol/L


(-3-3) 





 


FiO2 36  


 


White Blood Count


 


 6.5 x10^3/uL


(4.0-11.0)


 


Red Blood Count


 


 5.08 x10^6/uL


(4.30-5.70)


 


Hemoglobin


 


 14.3 g/dL


(13.0-17.5)


 


Hematocrit


 


 44.5 %


(39.0-53.0)


 


Mean Corpuscular Volume  88 fL () 


 


Mean Corpuscular Hemoglobin  28 pg (25-35) 


 


Mean Corpuscular Hemoglobin


Concent 


 32 g/dL


(31-37)


 


Red Cell Distribution Width


 


 17.8 %


(11.5-14.5)


 


Platelet Count


 


 122 x10^3/uL


(140-400)


 


Neutrophils (%) (Auto)  91 % (31-73) 


 


Lymphocytes (%) (Auto)  5 % (24-48) 


 


Monocytes (%) (Auto)  3 % (0-9) 


 


Eosinophils (%) (Auto)  0 % (0-3) 


 


Basophils (%) (Auto)  1 % (0-3) 


 


Neutrophils # (Auto)


 


 5.9 x10^3uL


(1.8-7.7)


 


Lymphocytes # (Auto)


 


 0.3 x10^3/uL


(1.0-4.8)


 


Monocytes # (Auto)


 


 0.2 x10^3/uL


(0.0-1.1)


 


Eosinophils # (Auto)


 


 0.0 x10^3/uL


(0.0-0.7)


 


Basophils # (Auto)


 


 0.1 x10^3/uL


(0.0-0.2)


 


Segmented Neutrophils %  52 % (35-66) 


 


Band Neutrophils %  41 % (0-9) 


 


Lymphocytes %  3 % (24-48) 


 


Atypical Lymphocytes %


(Manual) 


 1 % (0-0) 





 


Monocytes %  3 % (0-10) 


 


Platelet Estimate


 


 Decreased


(ADEQUATE)


 


Anisocytosis  Slight 


 


Ovalocytes  Few 


 


Prothrombin Time


 


 13.1 SEC


(11.7-14.0)


 


Prothromb Time International


Ratio 


 1.0 (0.8-1.1) 





 


Activated Partial


Thromboplast Time 


 35 SEC (24-38) 





 


D-Dimer (Joanne)


 


 2.36 ug/mlFEU


(0.00-0.50)


 


Sodium Level


 


 139 mmol/L


(136-145)


 


Potassium Level


 


 3.9 mmol/L


(3.5-5.1)


 


Chloride Level


 


 99 mmol/L


()


 


Carbon Dioxide Level


 


 33 mmol/L


(21-32)


 


Anion Gap  7 (6-14) 


 


Blood Urea Nitrogen


 


 34 mg/dL


(8-26)


 


Creatinine


 


 1.6 mg/dL


(0.7-1.3)


 


Estimated GFR


(Cockcroft-Gault) 


 42.9 





 


BUN/Creatinine Ratio  21 (6-20) 


 


Glucose Level


 


 203 mg/dL


(70-99)


 


Lactic Acid Level


 


 2.1 mmol/L


(0.4-2.0)


 


Calcium Level


 


 8.7 mg/dL


(8.5-10.1)


 


Magnesium Level


 


 2.4 mg/dL


(1.8-2.4)


 


Total Bilirubin


 


 0.2 mg/dL


(0.2-1.0)


 


Aspartate Amino Transf


(AST/SGOT) 


 25 U/L (15-37) 





 


Alanine Aminotransferase


(ALT/SGPT) 


 23 U/L (16-63) 





 


Alkaline Phosphatase


 


 63 U/L


()


 


Creatine Kinase


 


 47 U/L


()


 


Troponin I Quantitative


 


 0.046 ng/mL


(0.000-0.055)


 


NT-Pro-B-Type Natriuretic


Peptide 


 166 pg/mL


(0-124)


 


Total Protein


 


 7.2 g/dL


(6.4-8.2)


 


Albumin


 


 2.9 g/dL


(3.4-5.0)


 


Albumin/Globulin Ratio  0.7 (1.0-1.7) 

















JOHNATHAN EAST MD               May 5, 2019 10:37

## 2019-05-05 NOTE — RAD
EXAM: Abdomen, single view; chest, single view.

 

HISTORY: Endotracheal tube placement.

 

COMPARISON: 5/5/2019.

 

FINDINGS: Frontal views of the abdomen and pelvis are obtained. There is 

an endotracheal tube within the mid trachea. There is a nasogastric tube 

within the stomach. There is emphysema with suspected lower lobe 

atelectasis, interstitial infiltrate or chronic interstitial changes. 

There is no pleural effusion. The heart is normal in size. There is 

nonspecific air-filled bowel within the upper abdomen.

 

IMPRESSION:

1. Endotracheal tube and nasogastric tube in expected position.

2. Emphysema with suspected lower lobe atelectasis, interstitial 

infiltrate or chronic interstitial changes.

 

Electronically signed by: Kamila Spivey MD (5/5/2019 3:09 PM) [facility]

## 2019-05-05 NOTE — NUR
[id number]   Transfer from 6th floor to ICU  per bed in acute resp distress precipitated by 
existing lung disease,prob pneumonia Rx prior to admit on home antibiotics, and persistent 
smoking history. Increased PCo2/decreased PH requiring Bipap. Lethargic,w appropriate 
conversation on transfer.Sluggish in  orientation questions but appropriate. VSS,fluids at 
150/H. Repeat ABG called per RT w call back from Dr Issac beal orders to intubate. Anesthesia to 
unit for emergent intubation. Vented,tavera/NG placed w radiology confirmation. Sedatives as 
ordered,low gage pressor with decrease BP. Wife/daughter at bedside. Continue on flow sheets

## 2019-05-05 NOTE — NUR
1730 Noted change in EKG pattern. Underlying rhythm S promise 30's w  bigeminal PACs for 
combined rate 62. Palpable rhythm at radial site,. Dr Carmona informed ,no new orders. 
Repeat ABGs to Dr Davenport

## 2019-05-05 NOTE — EKG
[facility]

              [address] [place] [address]

Test Date:    2019               Test Time:    17:06:42

Pat Name:     MARGOT REHMAN            Department:   

Patient ID:   [id number]           Room:         112 1

Gender:       M                        Technician:   

:          1950               Requested By: ZULMA KNOWLES

Order Number: [id number]           Reading MD:   Abbe Liu

                                 Measurements

Intervals                              Axis          

Rate:         62                       P:            90

MO:           140                      QRS:          79

QRSD:         124                      T:            40

QT:           436                                    

QTc:          445                                    

                           Interpretive Statements

SINUS RHYTHM

ATRIAL PREMATURE COMPLEX(ES), BIGEMINY

RIGHT BUNDLE BRANCH BLOCK

QRS(T) CONTOUR ABNORMALITY

CONSIDER ANTEROLATERAL MYOCARDIAL DAMAGE





Electronically Signed On 2019 16:04:13 CDT by Abbe Liu

## 2019-05-05 NOTE — HP
ADMIT DATE:  05/05/2019



CHIEF COMPLAINT AND HISTORY OF PRESENT ILLNESS:  This is a 69-year-old male with

history of COPD with 3 liters of home oxygen, brought in via EMS because of

shortness of breath.  He states he has been sick for the last 2 weeks with a

cough productive of phlegm.  He has taken an antibiotic and steroids with

minimal improvement during this time, but has gotten worse again, bringing him

to the Emergency Room on the day of admission where the patient was found to be

in acute hypoxic hypercarbic respiratory failure and admitted, currently on

BiPAP.



PAST MEDICAL HISTORY:  Remarkable for COPD, coronary artery disease, prior CVA,

hypertension, hyperlipidemia, DVT.



PAST SURGICAL HISTORY:  He has had a prior cholecystectomy and left femoral

bypass as well as a prior neck surgery.



SOCIAL HISTORY:  He is a smoker and continues to do so.  He is a nondrinker,

does not use drugs.  Lives at home with his wife.



FAMILY HISTORY:  Noncontributory.



MEDICATIONS:  Brought with the patient, listed on the computer and have been

addressed.



ALLERGIES:  HE IS ALLERGIC TO PENICILLIN AND CODEINE.



REVIEW OF SYSTEMS:  As mentioned above.



PHYSICAL EXAMINATION:

GENERAL:  He is well-developed, well-nourished white male, nontoxic appearance,

on BiPAP.

VITAL SIGNS:  Stable.  He is afebrile.  Pulse is elevated in about the 100

range.

HEAD, EYES, EARS, NOSE AND THROAT:  Remarkable for the BiPAP.

NECK:  Supple, no adenopathy or thyromegaly.

CHEST:  Decreased breath sounds with bilateral expiratory wheezes throughout.

HEART:  Slightly tachycardic, regular without S3, S4, or murmur.

ABDOMEN:  Soft, nontender, without hepatosplenomegaly or mass.

EXTREMITIES:  Without cyanosis, clubbing or edema.

NEUROLOGIC:  Nonfocal.  He is alert and oriented at this point in time after

being confused in the Emergency Room, likely related to hypercarbia from over

oxygenation on his way to the hospital.



LABORATORY DATA:  Today, reveals a white count of 6500, hemoglobin 14.3,

platelet count is 122,000.  He does have a marked left shift with 91%

neutrophils and 41% bands.  Initial blood gas showed a pH of 7.26, pCO2 of 70,

pO2 of 49 on 36% FiO2 with a bicarbonate of 31.  Creatinine was 1.6 on

admission, I am unsure of his baseline.  Lactic acid was elevated at 2.1. 

D-dimer was elevated at 2.36.  Chest x-ray shows emphysema with suspected stable

superimposed bilateral lower lobe chronic interstitial changes or interstitial

infiltrate without any consolidation.



IMPRESSION:

1.  Exacerbation of chronic obstructive pulmonary disease with acute hypercarbic

hypoxic respiratory failure.

2.  Probable sepsis with bandemia as well as leukocytosis and tachycardia.

3.  Renal insufficiency, unsure as to acute versus chronic.

4.  Elevated D-dimer.

5.  Elevated blood sugar.



PLAN:  The patient has been admitted.  Pulmonary has been consulted.  The

patient will be covered with antibiotics, pulmonary toilet, etc., monitored,

managed and treated appropriately.

 



______________________________

JOHNATHAN EAST MD



DR:  VARSHA/skylar  JOB#:  [id number] / [id number]

DD:  05/05/2019 10:56  DT:  05/05/2019 11:23



GUS Hill MD

## 2019-05-05 NOTE — NUR
0700 -0803  Condition grave. vent alarm w check on patient. Found to be inn PEA. Code 
initiated w ACLS protocol followed. Continue on Code Blue sheet.

Family informed of change in status w negative outcome despite resuscitative measures. news 
difficult but were not expecting a positive outcome. Immediate family at bedside. Personal 
belongings  home w son Bo damico . Arrangements per family.  To morgue per protocol

## 2019-05-05 NOTE — RAD
EXAM: Chest, single view.

 

HISTORY: Shortness of breath.

 

COMPARISON: 5/2/2019

 

FINDINGS: A frontal view of the chest obtained. There is hyperinflation 

and hyperlucency due to emphysema. There is superimposed bilateral lower 

lobe interstitial opacity likely due to chronic interstitial changes or 

interstitial infiltrate. There is no consolidation, pleural effusion or 

pneumothorax. The heart is normal in size.

 

IMPRESSION: Emphysema with suspected stable superimposed bilateral lower 

lobe chronic interstitial changes or interstitial infiltrate. No 

consolidation is seen.

 

Electronically signed by: Kamila Spivey MD (5/5/2019 7:55 AM) [facility]

## 2019-05-05 NOTE — PHYS DOC
Past Medical History


Past Medical History:  CAD, COPD, CVA, DVT, High Cholesterol, Hypertension


Additional Past Medical Histor:  emphysema


Past Surgical History:  Cholecystectomy, Other


Additional Past Surgical Histo:  Left femoral bypass 2007, Neck


Smoking:  Cigarettes


Alcohol Use:  None


Drug Use:  None





Adult General


Chief Complaint


Chief Complaint:  SHORTNESS OF BREATH





HPI


HPI





Patient is a 69  year old male with history of COPD on 3 L home oxygen and curre

ntly smoking brought in by EMS because of shortness of breath. Patient is 

partially confused and answers "I do not Know" to the most of the questions. 

Patient wife states he has had increasing of shortness of breath and cough for 

more than 2 weeks and his primary care physician started him on Levaquin and 

Medrol Dosepak 9 days ago with partial improvement of his condition. Patient 

condition became worse after finishing Medrol Dosepak 4 days ago patient was not

able to eat for the last couple days and this morning tried to get breathing 

treatment and had a bowel incontinence without loss of consciousness or fall. 

Patient denies chest pain and fever and chills and had productive cough with yel

low sputum for the last 2 weeks.  EMS reported that patient was not on oxygen 

with O2 sat of low 70s and CPAP was started with increase of O2 sat to low 90s.





Review of Systems


Review of Systems





Constitutional: Denies fever or chills []


Eyes: Denies change in visual acuity, redness, or eye pain []


HENT: Denies nasal congestion or sore throat []


Respiratory: Reports cough and shortness of breath


Cardiovascular: No additional information not addressed in HPI []


GI: Denies abdominal pain, nausea, vomiting, bloody stools or diarrhea []


: Denies dysuria or hematuria []


Musculoskeletal: Denies back pain or joint pain []


Integument: Denies rash or skin lesions []


Neurologic: Denies headache, focal weakness or sensory changes []


Endocrine: Denies polyuria or polydipsia []





All other systems were reviewed and found to be within normal limits, except as 

documented in this note.





Current Medications


Current Medications





Current Medications








 Medications


  (Trade)  Dose


 Ordered  Sig/Tin  Start Time


 Stop Time Status Last Admin


Dose Admin


 


 Albuterol/


 Ipratropium


  (Duoneb)  3 ml  1X  ONCE  19 07:15


 19 07:21 DC 19 07:49


3 ML


 


 Methylprednisolone


 Sodium Succinate


  (SOLU-Medrol


 125MG VIAL)  125 mg  1X  ONCE  19 07:15


 19 07:21 DC 19 07:40


125 MG


 


 Sodium Chloride  1,000 ml @ 


 1,000 mls/hr  Q1H  19 07:14


 19 08:13 DC 19 07:30


1,000 MLS/HR











Allergies


Allergies





Allergies








Coded Allergies Type Severity Reaction Last Updated Verified


 


  Penicillins Allergy Severe Shortness of Air 1/15/14 Yes


 


  codeine Allergy Severe Shortness of Air 1/15/14 Yes











Physical Exam


Physical Exam





Constitutional: Well nourished, moderate distress, non-toxic appearance. []


HENT: Normocephalic, atraumatic, oropharynx dry.


Eyes: PERRLA, EOMI, conjunctiva normal, no discharge. [] 


Neck: Normal range of motion, no tenderness, supple, no stridor. [] 


Cardiovascular: Tachycardia, no murmur []


Lungs & Thorax:  Respiratory distress with intercostal retraction and 

hyperventilation, bilateral wheezing and rhonchi with decrease of air movement.


Abdomen: Bowel sounds normal, soft, no tenderness, no masses, no pulsatile 

masses. [] 


Skin: Warm, dry, no erythema, no rash. [] 


Back: No tenderness, no CVA tenderness. [] 


Extremities: No tenderness, no cyanosis, no clubbing, ROM intact, no edema. [] 


Neurologic: Alert, confused, normal motor function


Psychologic: Affect anxious.





Current Patient Data


Vital Signs





                                   Vital Signs








  Date Time  Temp Pulse Resp B/P (MAP) Pulse Ox O2 Delivery O2 Flow Rate FiO2


 


19 07:26  118 22 99/68 (78) 83 Nasal Cannula  


 


19 07:06 98.2      4.0 





 98.2       








Lab Values





                                Laboratory Tests








Test


 19


07:20 19


07:30


 


O2 Saturation 81 % (92-99)  L 


 


Arterial Blood pH


 7.26


(7.35-7.45)  L 





 


Arterial Blood pCO2 at


Patient Temp 70 mmHg


(35-46)  *H 





 


Arterial Blood pO2 at Patient


Temp 49 mmHg


()  *L 





 


Arterial Blood HCO3


 31 mmol/L


(21-28)  H 





 


Arterial Blood Base Excess


 2 mmol/L


(-3-3) 





 


FiO2 36   


 


White Blood Count


 


 6.5 x10^3/uL


(4.0-11.0)


 


Red Blood Count


 


 5.08 x10^6/uL


(4.30-5.70)


 


Hemoglobin


 


 14.3 g/dL


(13.0-17.5)


 


Hematocrit


 


 44.5 %


(39.0-53.0)


 


Mean Corpuscular Volume


 


 88 fL ()





 


Mean Corpuscular Hemoglobin  28 pg (25-35)  


 


Mean Corpuscular Hemoglobin


Concent 


 32 g/dL


(31-37)


 


Red Cell Distribution Width


 


 17.8 %


(11.5-14.5)  H


 


Platelet Count


 


 122 x10^3/uL


(140-400)  L


 


Neutrophils (%) (Auto)  91 % (31-73)  H


 


Lymphocytes (%) (Auto)  5 % (24-48)  L


 


Monocytes (%) (Auto)  3 % (0-9)  


 


Eosinophils (%) (Auto)  0 % (0-3)  


 


Basophils (%) (Auto)  1 % (0-3)  


 


Neutrophils # (Auto)


 


 5.9 x10^3uL


(1.8-7.7)


 


Lymphocytes # (Auto)


 


 0.3 x10^3/uL


(1.0-4.8)  L


 


Monocytes # (Auto)


 


 0.2 x10^3/uL


(0.0-1.1)


 


Eosinophils # (Auto)


 


 0.0 x10^3/uL


(0.0-0.7)


 


Basophils # (Auto)


 


 0.1 x10^3/uL


(0.0-0.2)


 


Platelet Estimate  Pending  


 


Prothrombin Time


 


 13.1 SEC


(11.7-14.0)


 


Prothrombin Time INR  1.0 (0.8-1.1)  


 


PTT


 


 35 SEC (24-38)





 


D-Dimer (Joanne)


 


 2.36 ug/mlFEU


(0.00-0.50)  H


 


Sodium Level


 


 139 mmol/L


(136-145)


 


Potassium Level


 


 3.9 mmol/L


(3.5-5.1)


 


Chloride Level


 


 99 mmol/L


()


 


Carbon Dioxide Level


 


 33 mmol/L


(21-32)  H


 


Anion Gap  7 (6-14)  


 


Blood Urea Nitrogen


 


 34 mg/dL


(8-26)  H


 


Creatinine


 


 1.6 mg/dL


(0.7-1.3)  H


 


Estimated GFR


(Cockcroft-Gault) 


 42.9  





 


BUN/Creatinine Ratio  21 (6-20)  H


 


Glucose Level


 


 203 mg/dL


(70-99)  H


 


Lactic Acid Level


 


 2.1 mmol/L


(0.4-2.0)  H


 


Calcium Level


 


 8.7 mg/dL


(8.5-10.1)


 


Magnesium Level


 


 2.4 mg/dL


(1.8-2.4)


 


Total Bilirubin


 


 0.2 mg/dL


(0.2-1.0)


 


Aspartate Amino Transferase


(AST) 


 25 U/L (15-37)





 


Alanine Aminotransferase (ALT)


 


 23 U/L (16-63)





 


Alkaline Phosphatase


 


 63 U/L


()


 


Creatine Kinase


 


 47 U/L


()


 


Troponin I Quantitative


 


 0.046 ng/mL


(0.000-0.055)


 


NT-Pro-B-Type Natriuretic


Peptide 


 166 pg/mL


(0-124)  H


 


Total Protein


 


 7.2 g/dL


(6.4-8.2)


 


Albumin


 


 2.9 g/dL


(3.4-5.0)  L


 


Albumin/Globulin Ratio


 


 0.7 (1.0-1.7)


L





                                Laboratory Tests


19 07:30








                                Laboratory Tests


19 07:30











EKG


EKG


EKG interpreted by me. EKG at 0715 showed sinus tachycardia at rate of 120, 

incomplete right bundle branch block, LVH, normal VT and QT intervals, T-wave 

abnormality in anteroseptal leads, no acute ST and T-wave abnormalities.





Radiology/Procedures


Radiology/Procedures


[facility]


                    [address]  [place], [place] [address] [phone]


                                        


                                 IMAGING REPORT





                                     Signed





PATIENT: MARGOT REHMAN   ACCOUNT: [id number]     MRN#: [id number]


: 1950           LOCATION: ER              AGE: 69


SEX: M                    EXAM DT: 19         ACCESSION#: [id number]


STATUS: REG ER            ORD. PHYSICIAN: LILLIANA FITZGERALD MD   


REASON: shortness of breath


PROCEDURE: PORTABLE CHEST 1V





EXAM: Chest, single view.


 


HISTORY: Shortness of breath.


 


COMPARISON: 2019


 


FINDINGS: A frontal view of the chest obtained. There is hyperinflation 


and hyperlucency due to emphysema. There is superimposed bilateral lower 


lobe interstitial opacity likely due to chronic interstitial changes or 


interstitial infiltrate. There is no consolidation, pleural effusion or 


pneumothorax. The heart is normal in size.


 


IMPRESSION: Emphysema with suspected stable superimposed bilateral lower 


lobe chronic interstitial changes or interstitial infiltrate. No 


consolidation is seen.


 


Electronically signed by: Kamila Perez MD (2019 7:55 AM) [facility]














DICTATED and SIGNED BY:     KAMILA PEREZ MD


DATE:     19 0755





Course & Med Decision Making


Course & Med Decision Making


Pertinent Labs and Imaging studies reviewed. (See chart for details)





Evolution of patient in ER showed 69-year-old male patient with history of COPD 

on home oxygen had O2 sat of low 70s without home oxygen this morning and 

brought by EMS on CPAP. Patient had a PCO2 of 70 and PO2 of 48 on 3 L of oxygen 

and BiPAP was started with increase of O2 sat to 95%. Patient treated with IV 

fluid, DuoNeb, Solu-Medrol and Rocephin and vancomycin ER with improvement of 

his condition. X-ray did not show infiltration. Patient had increase of 

BUN/creatinine compared to his baseline. D-dimer was more than 2 and VQ scan is 

pending. Patient did not have history of diabetes mellitus. Blood sugar was 203 

with history of recent prednisone use. Lactic acid was 2.1. Patient requiring 

admission for further evaluation and treatment. Discussed with Dr. Gus Carmona

 who is in agreement with admission. Discussed findings and plan with patient 

and family, who acknowledge understanding and agreement.





Dragon Disclaimer


Dragon Disclaimer


This electronic medical record was generated, in whole or in part, using a voice

 recognition dictation system.





Departure


Departure


Impression:  


   Primary Impression:  


   Acute respiratory distress


   Additional Impressions:  


   Sepsis


   Acute exacerbation of chronic obstructive pulmonary disease (COPD)


   CO2 narcosis


   Acute renal insufficiency


   Elevated blood sugar level


   Elevated d-dimer


Disposition:   ADMITTED AS INPATIENT (at 0821)


Admitting Physician:  Gus Carmona (accepted admission at 0 820)


Condition:  GUARDED


Referrals:  


GUS CARMONA MD (PCP)





Critical Care Time


 Critical care time was 60 minutes exclusive of procedures.





Problem Qualifiers











LILLIANA FITZGERALD MD              May 5, 2019 08:25

## 2019-05-05 NOTE — NUR
Trf pt to room 112 at 1147 per Dr Dave Carmona.  

-------------------------------------------------------------------------------

Addendum: 05/05/19 at 1205 by ISHAN ALMODOVAR RN RN

-------------------------------------------------------------------------------

Report given to DANE Vines prior to pt transfer

## 2019-05-05 NOTE — NUR
Received report from DANE Singer in ED.  Pt came to unit at 0902.  Pt was talking upon 
arrival, then fell asleep was was hard to arouse.  Received hx and meds from wife, Tracy at 
bedside.

## 2019-05-06 VITALS — SYSTOLIC BLOOD PRESSURE: 166 MMHG | DIASTOLIC BLOOD PRESSURE: 76 MMHG

## 2019-05-06 VITALS — DIASTOLIC BLOOD PRESSURE: 66 MMHG | SYSTOLIC BLOOD PRESSURE: 135 MMHG

## 2019-05-06 VITALS — SYSTOLIC BLOOD PRESSURE: 117 MMHG | DIASTOLIC BLOOD PRESSURE: 56 MMHG

## 2019-05-06 VITALS — SYSTOLIC BLOOD PRESSURE: 121 MMHG | DIASTOLIC BLOOD PRESSURE: 59 MMHG

## 2019-05-06 VITALS — DIASTOLIC BLOOD PRESSURE: 67 MMHG | SYSTOLIC BLOOD PRESSURE: 148 MMHG

## 2019-05-06 VITALS — SYSTOLIC BLOOD PRESSURE: 100 MMHG | DIASTOLIC BLOOD PRESSURE: 52 MMHG

## 2019-05-06 VITALS — SYSTOLIC BLOOD PRESSURE: 134 MMHG | DIASTOLIC BLOOD PRESSURE: 62 MMHG

## 2019-05-06 VITALS — SYSTOLIC BLOOD PRESSURE: 116 MMHG | DIASTOLIC BLOOD PRESSURE: 52 MMHG

## 2019-05-06 VITALS — DIASTOLIC BLOOD PRESSURE: 64 MMHG | SYSTOLIC BLOOD PRESSURE: 118 MMHG

## 2019-05-06 VITALS — SYSTOLIC BLOOD PRESSURE: 102 MMHG | DIASTOLIC BLOOD PRESSURE: 52 MMHG

## 2019-05-06 VITALS — SYSTOLIC BLOOD PRESSURE: 147 MMHG | DIASTOLIC BLOOD PRESSURE: 62 MMHG

## 2019-05-06 VITALS — SYSTOLIC BLOOD PRESSURE: 108 MMHG | DIASTOLIC BLOOD PRESSURE: 56 MMHG

## 2019-05-06 VITALS — SYSTOLIC BLOOD PRESSURE: 116 MMHG | DIASTOLIC BLOOD PRESSURE: 66 MMHG

## 2019-05-06 VITALS — DIASTOLIC BLOOD PRESSURE: 69 MMHG | SYSTOLIC BLOOD PRESSURE: 84 MMHG

## 2019-05-06 VITALS — DIASTOLIC BLOOD PRESSURE: 55 MMHG | SYSTOLIC BLOOD PRESSURE: 96 MMHG

## 2019-05-06 VITALS — SYSTOLIC BLOOD PRESSURE: 115 MMHG | DIASTOLIC BLOOD PRESSURE: 63 MMHG

## 2019-05-06 VITALS — DIASTOLIC BLOOD PRESSURE: 59 MMHG | SYSTOLIC BLOOD PRESSURE: 129 MMHG

## 2019-05-06 VITALS — SYSTOLIC BLOOD PRESSURE: 134 MMHG | DIASTOLIC BLOOD PRESSURE: 74 MMHG

## 2019-05-06 VITALS — DIASTOLIC BLOOD PRESSURE: 69 MMHG | SYSTOLIC BLOOD PRESSURE: 135 MMHG

## 2019-05-06 VITALS — DIASTOLIC BLOOD PRESSURE: 69 MMHG | SYSTOLIC BLOOD PRESSURE: 148 MMHG

## 2019-05-06 VITALS — DIASTOLIC BLOOD PRESSURE: 73 MMHG | SYSTOLIC BLOOD PRESSURE: 102 MMHG

## 2019-05-06 VITALS — SYSTOLIC BLOOD PRESSURE: 112 MMHG | DIASTOLIC BLOOD PRESSURE: 60 MMHG

## 2019-05-06 VITALS — DIASTOLIC BLOOD PRESSURE: 78 MMHG | SYSTOLIC BLOOD PRESSURE: 164 MMHG

## 2019-05-06 VITALS — DIASTOLIC BLOOD PRESSURE: 60 MMHG | SYSTOLIC BLOOD PRESSURE: 112 MMHG

## 2019-05-06 VITALS — DIASTOLIC BLOOD PRESSURE: 58 MMHG | SYSTOLIC BLOOD PRESSURE: 110 MMHG

## 2019-05-06 VITALS — DIASTOLIC BLOOD PRESSURE: 57 MMHG | SYSTOLIC BLOOD PRESSURE: 131 MMHG

## 2019-05-06 VITALS — DIASTOLIC BLOOD PRESSURE: 75 MMHG | SYSTOLIC BLOOD PRESSURE: 168 MMHG

## 2019-05-06 VITALS — DIASTOLIC BLOOD PRESSURE: 62 MMHG | SYSTOLIC BLOOD PRESSURE: 121 MMHG

## 2019-05-06 VITALS — DIASTOLIC BLOOD PRESSURE: 60 MMHG | SYSTOLIC BLOOD PRESSURE: 115 MMHG

## 2019-05-06 VITALS — SYSTOLIC BLOOD PRESSURE: 127 MMHG | DIASTOLIC BLOOD PRESSURE: 57 MMHG

## 2019-05-06 VITALS — DIASTOLIC BLOOD PRESSURE: 72 MMHG | SYSTOLIC BLOOD PRESSURE: 119 MMHG

## 2019-05-06 LAB
ANION GAP SERPL CALC-SCNC: 8 MMOL/L (ref 6–14)
BASE EXCESS ABG: -1 MMOL/L (ref -3–3)
BASE EXCESS ABG: -4 MMOL/L (ref -3–3)
BASOPHILS # BLD AUTO: 0 X10^3/UL (ref 0–0.2)
BASOPHILS NFR BLD: 0 % (ref 0–3)
BUN SERPL-MCNC: 32 MG/DL (ref 8–26)
CALCIUM SERPL-MCNC: 8 MG/DL (ref 8.5–10.1)
CHLORIDE SERPL-SCNC: 109 MMOL/L (ref 98–107)
CO2 SERPL-SCNC: 26 MMOL/L (ref 21–32)
CREAT SERPL-MCNC: 1.2 MG/DL (ref 0.7–1.3)
EOSINOPHIL NFR BLD: 0 % (ref 0–3)
EOSINOPHIL NFR BLD: 0 X10^3/UL (ref 0–0.7)
ERYTHROCYTE [DISTWIDTH] IN BLOOD BY AUTOMATED COUNT: 17.7 % (ref 11.5–14.5)
GFR SERPLBLD BASED ON 1.73 SQ M-ARVRAT: 59.9 ML/MIN
GLUCOSE SERPL-MCNC: 179 MG/DL (ref 70–99)
HCO3 BLDA-SCNC: 23 MMOL/L (ref 21–28)
HCO3 BLDA-SCNC: 27 MMOL/L (ref 21–28)
HCT VFR BLD CALC: 39.3 % (ref 39–53)
HGB BLD-MCNC: 12.8 G/DL (ref 13–17.5)
INSPIRATION SETTING TIME VENT: 40
INSPIRATION SETTING TIME VENT: 40
LYMPHOCYTES # BLD: 0.2 X10^3/UL (ref 1–4.8)
LYMPHOCYTES NFR BLD AUTO: 4 % (ref 24–48)
MCH RBC QN AUTO: 29 PG (ref 25–35)
MCHC RBC AUTO-ENTMCNC: 33 G/DL (ref 31–37)
MCV RBC AUTO: 88 FL (ref 79–100)
MONO #: 0.2 X10^3/UL (ref 0–1.1)
MONOCYTES NFR BLD: 4 % (ref 0–9)
NEUT #: 4.6 X10^3UL (ref 1.8–7.7)
NEUTROPHILS NFR BLD AUTO: 91 % (ref 31–73)
PCO2 BLDA: 51 MMHG (ref 35–46)
PCO2 BLDA: 58 MMHG (ref 35–46)
PLATELET # BLD AUTO: 112 X10^3/UL (ref 140–400)
PO2 BLDA: 83 MMHG (ref 65–108)
PO2 BLDA: 83 MMHG (ref 65–108)
POTASSIUM SERPL-SCNC: 4.7 MMOL/L (ref 3.5–5.1)
RBC # BLD AUTO: 4.45 X10^6/UL (ref 4.3–5.7)
SAO2 % BLDA: 95 % (ref 92–99)
SAO2 % BLDA: 95 % (ref 92–99)
SODIUM SERPL-SCNC: 143 MMOL/L (ref 136–145)
WBC # BLD AUTO: 5.1 X10^3/UL (ref 4–11)

## 2019-05-06 PROCEDURE — 0B9F8ZX DRAINAGE OF RIGHT LOWER LUNG LOBE, VIA NATURAL OR ARTIFICIAL OPENING ENDOSCOPIC, DIAGNOSTIC: ICD-10-PCS | Performed by: INTERNAL MEDICINE

## 2019-05-06 PROCEDURE — 0B9D8ZX DRAINAGE OF RIGHT MIDDLE LUNG LOBE, VIA NATURAL OR ARTIFICIAL OPENING ENDOSCOPIC, DIAGNOSTIC: ICD-10-PCS | Performed by: INTERNAL MEDICINE

## 2019-05-06 RX ADMIN — SIMVASTATIN SCH MG: 40 TABLET, FILM COATED ORAL at 20:44

## 2019-05-06 RX ADMIN — MIDAZOLAM PRN MLS/HR: 5 INJECTION, SOLUTION INTRAMUSCULAR; INTRAVENOUS at 03:20

## 2019-05-06 RX ADMIN — LOSARTAN POTASSIUM SCH MG: 50 TABLET ORAL at 09:39

## 2019-05-06 RX ADMIN — CLOPIDOGREL BISULFATE SCH MG: 75 TABLET ORAL at 09:39

## 2019-05-06 RX ADMIN — CEFTRIAXONE SCH GM: 1 INJECTION, POWDER, FOR SOLUTION INTRAMUSCULAR; INTRAVENOUS at 09:38

## 2019-05-06 RX ADMIN — METHYLPREDNISOLONE SODIUM SUCCINATE SCH MG: 40 INJECTION, POWDER, FOR SOLUTION INTRAMUSCULAR; INTRAVENOUS at 21:39

## 2019-05-06 RX ADMIN — IPRATROPIUM BROMIDE AND ALBUTEROL SULFATE SCH ML: .5; 3 SOLUTION RESPIRATORY (INHALATION) at 20:25

## 2019-05-06 RX ADMIN — IPRATROPIUM BROMIDE AND ALBUTEROL SULFATE SCH ML: .5; 3 SOLUTION RESPIRATORY (INHALATION) at 16:28

## 2019-05-06 RX ADMIN — BACITRACIN SCH MLS/HR: 5000 INJECTION, POWDER, FOR SOLUTION INTRAMUSCULAR at 04:36

## 2019-05-06 RX ADMIN — ASPIRIN 81 MG SCH MG: 81 TABLET ORAL at 09:39

## 2019-05-06 RX ADMIN — AZITHROMYCIN MONOHYDRATE SCH MLS/HR: 500 INJECTION, POWDER, LYOPHILIZED, FOR SOLUTION INTRAVENOUS at 09:40

## 2019-05-06 RX ADMIN — CLOPIDOGREL BISULFATE SCH MG: 75 TABLET ORAL at 15:34

## 2019-05-06 RX ADMIN — ASPIRIN 81 MG SCH MG: 81 TABLET ORAL at 15:34

## 2019-05-06 RX ADMIN — IPRATROPIUM BROMIDE AND ALBUTEROL SULFATE SCH ML: .5; 3 SOLUTION RESPIRATORY (INHALATION) at 08:26

## 2019-05-06 RX ADMIN — METHYLPREDNISOLONE SODIUM SUCCINATE SCH MG: 40 INJECTION, POWDER, FOR SOLUTION INTRAMUSCULAR; INTRAVENOUS at 15:36

## 2019-05-06 RX ADMIN — LOSARTAN POTASSIUM SCH MG: 50 TABLET ORAL at 09:00

## 2019-05-06 RX ADMIN — IPRATROPIUM BROMIDE AND ALBUTEROL SULFATE SCH ML: .5; 3 SOLUTION RESPIRATORY (INHALATION) at 12:28

## 2019-05-06 RX ADMIN — METHYLPREDNISOLONE SODIUM SUCCINATE SCH MG: 40 INJECTION, POWDER, FOR SOLUTION INTRAMUSCULAR; INTRAVENOUS at 05:58

## 2019-05-06 RX ADMIN — ENOXAPARIN SODIUM SCH MG: 40 INJECTION SUBCUTANEOUS at 15:34

## 2019-05-06 RX ADMIN — FAMOTIDINE SCH MG: 10 INJECTION, SOLUTION INTRAVENOUS at 20:44

## 2019-05-06 NOTE — PDOC
Provider Note


Provider Note


vss, on vent- labs ok but platelets 112K, will follow - add lovenox proph dose 

as off asa/plavix re PVD stents- rest same, wean as tolerated











GUS CHERY MD              May 6, 2019 08:59

## 2019-05-06 NOTE — NUR
SS following for discharge planning. SS reviewed pt chart. Pt is from home with spouse and 
is currently on the vent. Pt was discharged from [facility] in March of 2019 
and was previously on services with [facility], [phone]; fax [phone]. 
SS will continue to follow for discharge planning.

## 2019-05-06 NOTE — RAD
Bilateral lower extremity venous ultrasound, 5/6/2019:

 

History: Bilateral leg edema

 

Duplex evaluation of the deep veins in the lower extremities was performed

including grayscale, color-flow and spectral Doppler analysis. The femoral

and popliteal veins demonstrate normal compressibility and normal 

responses to distal augmentation maneuvers.  Color imaging of those 

vessels shows no evidence of intraluminal clot.  The visualized deep veins

in both calves are patent. 

 

IMPRESSION:   There is no sonographic evidence of deep vein thrombosis in 

either lower extremity. 

 

Electronically signed by: Rick Moritz, MD (5/6/2019 10:36 AM) [facility]

## 2019-05-06 NOTE — OP
DATE OF SURGERY:  



PROCEDURE:  Bronchoscopy.



INDICATIONS:

1.   Abnormal chest x-ray, pneumonia.

2.  Respiratory failure.



DESCRIPTION OF PROCEDURE:  Informed consent was obtained from the patient's

wife, she agreed to proceed with the procedure.  Bronch was introduced through

the endotracheal tube.  The distal trachea was visualized.  Some white

secretions seen in the distal trachea, and they were seen in the right mainstem

bronchus as well as in the right middle lobe and minimally in the right lower

lobe.  There was some hyperemia at the opening of the right middle lobe.  All

subsegments of right upper, right middle and right lower lobe were examined.  No

endobronchial lesion seen.  Bronchoalveolar lavage performed from the right

middle lobe and right lower lobe.  Bronch was introduced into the left lung.  No

significant secretions seen in the left upper lobe.  Minimal secretion seen at

the lingula and no significant secretions in the left lower lobe.



IMPRESSION:

1.  Mild white secretions seen in the right middle and right lower lobe and

minimally in the lingula.

2.  Bronchoalveolar lavage performed from the right middle lobe and right lower

lobe and sent for appropriate cultures.

3.  No endobronchial lesions seen.

4.  Follow the culture results.

 



______________________________

ZULMA KNOWLES MD



DR:  EDUAR/skylar  JOB#:  [id number] / [id number]

DD:  05/06/2019 11:02  DT:  05/06/2019 11:10

## 2019-05-06 NOTE — RAD
CTA of the chest with contrast, 5/6/2019:

 

HISTORY: Respiratory failure, elevated d-dimer

 

Multidetector CT imaging was performed following an IV bolus injection of 

iodinated contrast material. Multiplanar reconstructions were produced 

including coronal and sagittal MIP images.

 

The central pulmonary arteries are well opacified and no filling defects 

are seen to suggest pulmonary emboli. There is moderate calcific plaquing 

of the thoracic aorta without evidence of aneurysm. Several scattered 

coronary artery calcifications are noted. An ET tube is in place with its 

tip located well above the milly. An NG tube extends into the stomach.

 

There are emphysematous changes in the lungs. There are multinodular 

tree-in-bud type opacities in the lower chest as well as scattered 

reticular opacities and interlobular septal thickening. These findings 

have worsened, particularly in the posterior aspect of the right lower 

lobe where there is mild confluent infiltrate. An inflammatory etiology is

likely. No significant pleural fluid is evident.

 

Several right renal cysts are noted. There are mild scattered degenerative

changes in the spine.

 

 

IMPRESSION:

1. No CT evidence of central pulmonary emboli.

2. Emphysema.

3. Worsening tree-in-bud and interstitial opacities predominantly in the 

lower chest with mild confluent infiltrate posteriorly in the right lower 

lobe. An inflammatory/infectious etiology is most likely.

 

 

PQRS Compliance Statement:

 

One or more of the following individualized dose reduction techniques were

utilized for this examination:

1. Automated exposure control

2. Adjustment of the mA and/or kV according to patient size

3. Use of iterative reconstruction technique

 

Electronically signed by: Rick Moritz, MD (5/6/2019 2:41 PM) [facility]

## 2019-05-06 NOTE — NUR
No sedation vacation or trial per Dr. Davenport. Titrating per patient's BP on levophed. 
Decreased Versed. Family at bedside.

## 2019-05-06 NOTE — CONS
DATE OF CONSULTATION:  



ATTENDING PHYSICIAN:  Dr. Marcus Vazquez



REASON FOR CONSULTATION:  Respiratory failure.



HISTORY OF PRESENT ILLNESS:  The patient is a 69-year-old male with history of

suspected severe COPD with chronic hypoxia, on 3 liters of oxygen.  He was

brought into the hospital yesterday as he has been sick for the last 2 weeks

with cough productive of sputum.  The wife states it has been white.  Wife

states that he has been treated with a prolonged course of antibiotic, Levaquin.

 The patient initially was admitted on the floor, but because of his persistent

dyspnea, he was moved to the ICU.  At the time when consult was called to me

yesterday his pH was 7.26, pCO2 of 70 and a pO2 of 49 with bicarbonate of 31. 

Since then, the patient was placed on BiPAP and then he struggled to improve on

the BiPAP.  His pCO2 went up to 82 with a pH of 7.21 and a pO2 of 68 while on

BiPAP.  At that time, he appeared to be more lethargic as well, as a result I

requested Anesthesia to intubate the patient.  Since intubation, he has been on

mechanical ventilation.  His latest ABGs have shown a pH of 7.27, pCO2 of 51 and

a pO2 of 83 with a bicarbonate of 23, which is down from initial of 31.  His BUN

and creatinine is 32 and 1.2.  He is currently also requiring vasopressor,

Levophed.  He is up to 5 mcg, he was initially on 12 mcg.  Consultation

requested for further evaluation and management.  I have also reviewed his CT

chest from March of this year and he has reticular nodular infiltrates, worse in

the right lower lobe.  His chest x-ray is now showing more accentuation of the

infiltrates and likely related to pneumonia.



PAST MEDICAL HISTORY:  Significant for history of COPD, suspect chronic hypoxic

respiratory failure, coronary artery disease, history of prior CVA,

hypertension, hyperlipidemia, DVT.



PAST SURGICAL HISTORY:  Prior cholecystectomy and left femoral bypass surgery.



SOCIAL HISTORY:  He has been a smoker for 50 years, continues to smoke

cigarettes.  Nonalcoholic.



FAMILY HISTORY:  Noncontributory.



ALLERGIES:  PENICILLIN AND CODEINE.



REVIEW OF SYSTEMS:  Unable to obtain from the patient.



CURRENT MEDICATIONS:  Reviewed as listed in the MRAD.



PHYSICAL EXAMINATION:

VITAL SIGNS:  Reviewed.  Blood pressure is stable on 5 mcg of Levophed.  He is

afebrile.  Pulse ox is 99% on 40% FiO2 on assist control.

HEENT:  Sclerae nonicteric.

NECK:  Supple.

LUNGS:  Diminished breath sounds.

CARDIOVASCULAR:  Regular rate and rhythm.

ABDOMEN:  Soft, obese.

EXTREMITIES:  With trace ankle edema.



LABORATORY DATA:  Reviewed.  BUN 32, creatinine 1.2.  INR is 1.0.  D-dimer is

2.36.  White cell count 5.1, hemoglobin 12.8 and platelets are 112.



IMPRESSION:

1.  Acute-on-chronic hypoxic and hypercapnic respiratory failure, likely related

to acute exacerbation of chronic obstructive pulmonary disease and suspected

progressive interstitial pneumonia.

2.  Abnormal chest x-ray with bilateral interstitial infiltrates, more on the

right than on the left.  He had an abnormal CT chest in March of this year where

he had reticulonodular interstitial infiltrates and it appears to have

progressed.  He would benefit from a diagnostic bronchoscopy to rule out any

resistant organisms.

3.  I cannot exclude the possibility of interstitial edema; however, his

echocardiogram done in March showed a normal ejection fraction and no

significant valvular heart disease.

4.  Abnormal D-dimer, which can be nonspecific finding, but we will obtain CTA

chest.  He has history of DVT longer then 10 years ago.  We will obtain Dopplers
of lower

extremities as well.

5.  Azotemia.

6.  Shock, currently he is requiring Levophed.  Likely combination of

hypovolemic and septic shock.

7.  Abnormal ABG with persistent acidosis. initially acute on chronic 
hypercapnic and now with metabolic component.



RECOMMENDATIONS:

1.  Continue with present assist control mode.  His ABGs have shown improvement

of hir hypercapnia; however, his acidosis is now metabolic .  He would

benefit from 2 amps of bicarbonate and I will follow ABGs.

2.  Continue present antibiotics.

3.  Bronchoscopy today to rule out any resistant organisms, need to cover for

gram-negative and gram-positive organisms.

4.  Obtain CTA chest along with venous Dopplers of lower extremities.

5.  Bronchodilators.

6.  Start enteral nutrition.

7.  Deep venous thrombosis prophylaxis.

8.  Stress ulcer prophylaxis.

9.  Discussed with the patient's wife and RN and we will follow along with you.



Critical care time 40 minutes.

 



______________________________

ZULMA KNOWLES MD



DR:  EDUAR/skylar  JOB#:  [id number] / [id number]

DD:  05/06/2019 09:40  DT:  05/06/2019 10:01

NILESH

## 2019-05-07 VITALS — DIASTOLIC BLOOD PRESSURE: 73 MMHG | SYSTOLIC BLOOD PRESSURE: 123 MMHG

## 2019-05-07 VITALS — SYSTOLIC BLOOD PRESSURE: 98 MMHG | DIASTOLIC BLOOD PRESSURE: 51 MMHG

## 2019-05-07 VITALS — DIASTOLIC BLOOD PRESSURE: 54 MMHG | SYSTOLIC BLOOD PRESSURE: 109 MMHG

## 2019-05-07 VITALS — DIASTOLIC BLOOD PRESSURE: 72 MMHG | SYSTOLIC BLOOD PRESSURE: 119 MMHG

## 2019-05-07 VITALS — DIASTOLIC BLOOD PRESSURE: 51 MMHG | SYSTOLIC BLOOD PRESSURE: 102 MMHG

## 2019-05-07 VITALS — DIASTOLIC BLOOD PRESSURE: 59 MMHG | SYSTOLIC BLOOD PRESSURE: 118 MMHG

## 2019-05-07 VITALS — SYSTOLIC BLOOD PRESSURE: 109 MMHG | DIASTOLIC BLOOD PRESSURE: 56 MMHG

## 2019-05-07 VITALS — SYSTOLIC BLOOD PRESSURE: 108 MMHG | DIASTOLIC BLOOD PRESSURE: 59 MMHG

## 2019-05-07 VITALS — DIASTOLIC BLOOD PRESSURE: 64 MMHG | SYSTOLIC BLOOD PRESSURE: 114 MMHG

## 2019-05-07 VITALS — DIASTOLIC BLOOD PRESSURE: 55 MMHG | SYSTOLIC BLOOD PRESSURE: 112 MMHG

## 2019-05-07 VITALS — DIASTOLIC BLOOD PRESSURE: 65 MMHG | SYSTOLIC BLOOD PRESSURE: 104 MMHG

## 2019-05-07 VITALS — SYSTOLIC BLOOD PRESSURE: 121 MMHG | DIASTOLIC BLOOD PRESSURE: 77 MMHG

## 2019-05-07 VITALS — SYSTOLIC BLOOD PRESSURE: 107 MMHG | DIASTOLIC BLOOD PRESSURE: 55 MMHG

## 2019-05-07 VITALS — SYSTOLIC BLOOD PRESSURE: 122 MMHG | DIASTOLIC BLOOD PRESSURE: 66 MMHG

## 2019-05-07 VITALS — DIASTOLIC BLOOD PRESSURE: 62 MMHG | SYSTOLIC BLOOD PRESSURE: 128 MMHG

## 2019-05-07 VITALS — SYSTOLIC BLOOD PRESSURE: 116 MMHG | DIASTOLIC BLOOD PRESSURE: 59 MMHG

## 2019-05-07 VITALS — DIASTOLIC BLOOD PRESSURE: 66 MMHG | SYSTOLIC BLOOD PRESSURE: 117 MMHG

## 2019-05-07 VITALS — DIASTOLIC BLOOD PRESSURE: 52 MMHG | SYSTOLIC BLOOD PRESSURE: 101 MMHG

## 2019-05-07 VITALS — SYSTOLIC BLOOD PRESSURE: 107 MMHG | DIASTOLIC BLOOD PRESSURE: 56 MMHG

## 2019-05-07 VITALS — SYSTOLIC BLOOD PRESSURE: 93 MMHG | DIASTOLIC BLOOD PRESSURE: 49 MMHG

## 2019-05-07 VITALS — DIASTOLIC BLOOD PRESSURE: 58 MMHG | SYSTOLIC BLOOD PRESSURE: 119 MMHG

## 2019-05-07 VITALS — DIASTOLIC BLOOD PRESSURE: 67 MMHG | SYSTOLIC BLOOD PRESSURE: 114 MMHG

## 2019-05-07 VITALS — DIASTOLIC BLOOD PRESSURE: 67 MMHG | SYSTOLIC BLOOD PRESSURE: 112 MMHG

## 2019-05-07 VITALS — DIASTOLIC BLOOD PRESSURE: 68 MMHG | SYSTOLIC BLOOD PRESSURE: 110 MMHG

## 2019-05-07 LAB
ANION GAP SERPL CALC-SCNC: 4 MMOL/L (ref 6–14)
BASE EXCESS ABG: 1 MMOL/L (ref -3–3)
BUN SERPL-MCNC: 40 MG/DL (ref 8–26)
CALCIUM SERPL-MCNC: 8.3 MG/DL (ref 8.5–10.1)
CHLORIDE SERPL-SCNC: 111 MMOL/L (ref 98–107)
CO2 SERPL-SCNC: 30 MMOL/L (ref 21–32)
CREAT SERPL-MCNC: 1.1 MG/DL (ref 0.7–1.3)
ERYTHROCYTE [DISTWIDTH] IN BLOOD BY AUTOMATED COUNT: 17.8 % (ref 11.5–14.5)
GFR SERPLBLD BASED ON 1.73 SQ M-ARVRAT: 66.2 ML/MIN
GLUCOSE SERPL-MCNC: 206 MG/DL (ref 70–99)
HCO3 BLDA-SCNC: 28 MMOL/L (ref 21–28)
HCT VFR BLD CALC: 36.8 % (ref 39–53)
HGB BLD-MCNC: 11.7 G/DL (ref 13–17.5)
INSPIRATION SETTING TIME VENT: 40
MCH RBC QN AUTO: 28 PG (ref 25–35)
MCHC RBC AUTO-ENTMCNC: 32 G/DL (ref 31–37)
MCV RBC AUTO: 88 FL (ref 79–100)
PCO2 BLDA: 56 MMHG (ref 35–46)
PLATELET # BLD AUTO: 111 X10^3/UL (ref 140–400)
PO2 BLDA: 80 MMHG (ref 65–108)
POTASSIUM SERPL-SCNC: 4.3 MMOL/L (ref 3.5–5.1)
RBC # BLD AUTO: 4.18 X10^6/UL (ref 4.3–5.7)
SAO2 % BLDA: 95 % (ref 92–99)
SODIUM SERPL-SCNC: 145 MMOL/L (ref 136–145)
WBC # BLD AUTO: 5.8 X10^3/UL (ref 4–11)

## 2019-05-07 RX ADMIN — METHYLPREDNISOLONE SODIUM SUCCINATE SCH MG: 40 INJECTION, POWDER, FOR SOLUTION INTRAMUSCULAR; INTRAVENOUS at 22:00

## 2019-05-07 RX ADMIN — ENOXAPARIN SODIUM SCH MG: 40 INJECTION SUBCUTANEOUS at 08:39

## 2019-05-07 RX ADMIN — METHYLPREDNISOLONE SODIUM SUCCINATE SCH MG: 40 INJECTION, POWDER, FOR SOLUTION INTRAMUSCULAR; INTRAVENOUS at 06:07

## 2019-05-07 RX ADMIN — AZITHROMYCIN MONOHYDRATE SCH MLS/HR: 500 INJECTION, POWDER, LYOPHILIZED, FOR SOLUTION INTRAVENOUS at 07:49

## 2019-05-07 RX ADMIN — FAMOTIDINE SCH MG: 10 INJECTION, SOLUTION INTRAVENOUS at 07:51

## 2019-05-07 RX ADMIN — METHYLPREDNISOLONE SODIUM SUCCINATE SCH MG: 40 INJECTION, POWDER, FOR SOLUTION INTRAMUSCULAR; INTRAVENOUS at 14:30

## 2019-05-07 RX ADMIN — IPRATROPIUM BROMIDE AND ALBUTEROL SULFATE SCH ML: .5; 3 SOLUTION RESPIRATORY (INHALATION) at 13:25

## 2019-05-07 RX ADMIN — FAMOTIDINE SCH MG: 10 INJECTION, SOLUTION INTRAVENOUS at 20:35

## 2019-05-07 RX ADMIN — IPRATROPIUM BROMIDE AND ALBUTEROL SULFATE SCH ML: .5; 3 SOLUTION RESPIRATORY (INHALATION) at 07:32

## 2019-05-07 RX ADMIN — SIMVASTATIN SCH MG: 40 TABLET, FILM COATED ORAL at 20:36

## 2019-05-07 RX ADMIN — CEFTRIAXONE SCH GM: 1 INJECTION, POWDER, FOR SOLUTION INTRAMUSCULAR; INTRAVENOUS at 07:49

## 2019-05-07 RX ADMIN — MIDAZOLAM PRN MLS/HR: 5 INJECTION, SOLUTION INTRAMUSCULAR; INTRAVENOUS at 18:27

## 2019-05-07 RX ADMIN — IPRATROPIUM BROMIDE AND ALBUTEROL SULFATE SCH ML: .5; 3 SOLUTION RESPIRATORY (INHALATION) at 19:46

## 2019-05-07 RX ADMIN — IPRATROPIUM BROMIDE AND ALBUTEROL SULFATE SCH ML: .5; 3 SOLUTION RESPIRATORY (INHALATION) at 11:35

## 2019-05-07 RX ADMIN — CLOPIDOGREL BISULFATE SCH MG: 75 TABLET ORAL at 07:48

## 2019-05-07 RX ADMIN — LOSARTAN POTASSIUM SCH MG: 50 TABLET ORAL at 07:51

## 2019-05-07 RX ADMIN — ASPIRIN 81 MG SCH MG: 81 TABLET ORAL at 07:48

## 2019-05-07 NOTE — PATHOLOGY
Note

[facility] Accession Number: [id number]

   TESTS               RESULT  FLAG  UNITS    REF RANGE  LAB

------------------------------------------------------------

   Clinician Provided Cytology Information

   No. of containers..01 Other (Miscellaneous)

Source:                                                   01

   BAL RML/RLL MIXED

DIAGNOSIS:                                                02

   BAL RML/RLL MIXED

   NEGATIVE FOR MALIGNANT CELLS.

   BRONCHIAL EPITHELIAL CELLS, PULMONARY MACROPHAGES, AND INFLAMMATORY CELLS

   PRESENT.

Signed out by:                                            02

   Rome Patterson MD, Pathologist

   NPI- [id number]

Performed by:                                             01

   Ambreen Robbins, Cytotechnologist ([facility])

Gross description:                                        01

   5ML, WHITE, CLOUDY

   /LCS



------------------------------------------------------------

    FLAG LEGEND:

    L-Low Normal,H-High Normal,LL-Alert Low,HH-Alert High

    <-Panic Low,>-Panic High,A-Abnormal,AA-Critical Abnormal

------------------------------------------------------------



Performed at:

[address]

   [place]  [address]

   Juno Marie MD, Phone: [phone]

02 [facility]

   [address] [place]  [address]

   Rome Patterson MD, Phone: [phone]

Specimen Comment: A courtesy copy of this report has been sent to

Specimen Comment: [phone], [phone].

Specimen Comment: Report sent to DR KNOWLES / DR CHERY

Specimen Comment: A duplicate report has been generated due to demographic updates.

***Performed at:  [address] [place] [address] [address], [place], [place]  [id number]

   MD Juno Marie MD Phone:  [phone]

## 2019-05-07 NOTE — PDOC
Provider Note


Provider Note


vss, bp lowish, fair output- labs ok - bronch cultures pending- cxr noted, cont 

lovenox w/ asa/plavix re high dvt risk











GUS CHERY MD              May 7, 2019 08:40

## 2019-05-07 NOTE — RAD
Portable chest, 5/7/2019:

 

HISTORY: Respiratory failure

 

Comparison is made to the study of 5/5/2019. The ET tube tip lies well 

above the milly. An NG tube extends into the stomach. The heart size is 

normal. Slight prominence of the basilar pulmonary markings is unchanged. 

Basilar pulmonary infiltrate was better demonstrated on the recent CT 

study. No new pulmonary abnormality is seen. There is no evidence of 

pleural fluid.

 

IMPRESSION: No significant change since 5/5/2019.

 

Electronically signed by: Rick Moritz, MD (5/7/2019 7:39 AM) [facility]

## 2019-05-07 NOTE — PDOC
PULMONARY PROGRESS NOTES


Subjective


PT INTUBATED


SEDATED ON AC MODE


Vitals





Vital Signs








  Date Time  Temp Pulse Resp B/P (MAP) Pulse Ox O2 Delivery O2 Flow Rate FiO2


 


5/7/19 08:54     94 Ventilator  


 


5/7/19 08:00  52 20 104/65 (78)    


 


5/7/19 07:00 98.6      4.0 





 98.6       








Lungs:  Crackles


Cardiovascular:  S1, S2


Abdomen:  Soft


Extremities:  No Edema


Skin:  Warm


Labs





Laboratory Tests








Test


 5/5/19


11:15 5/5/19


13:40 5/5/19


15:45 5/5/19


18:25


 


Lactic Acid Level


 1.2 mmol/L


(0.4-2.0) 


 


 





 


O2 Saturation  91 % (92-99)  96 % (92-99)  97 % (92-99) 


 


Arterial Blood pH


 


 7.21


(7.35-7.45) 7.26


(7.35-7.45) 7.28


(7.35-7.45)


 


Arterial Blood pCO2 at


Patient Temp 


 82 mmHg


(35-46) 65 mmHg


(35-46) 60 mmHg


(35-46)


 


Arterial Blood pO2 at Patient


Temp 


 68 mmHg


() 91 mmHg


() 90 mmHg


()


 


Arterial Blood HCO3


 


 32 mmol/L


(21-28) 29 mmol/L


(21-28) 27 mmol/L


(21-28)


 


Arterial Blood Base Excess


 


 2 mmol/L


(-3-3) 0 mmol/L


(-3-3) -1 mmol/L


(-3-3)


 


FiO2  50  50  50 


 


Test


 5/5/19


22:50 5/6/19


05:20 5/6/19


05:25 5/6/19


08:00


 


Urine Collection Type Unknown    


 


Urine Color Yellow    


 


Urine Clarity Clear    


 


Urine pH 6.0    


 


Urine Specific Gravity 1.025    


 


Urine Protein


 30 mg/dL


(NEG-TRACE) 


 


 





 


Urine Glucose (UA)


 Negative mg/dL


(NEG) 


 


 





 


Urine Ketones (Stick)


 Negative mg/dL


(NEG) 


 


 





 


Urine Blood Negative (NEG)    


 


Urine Nitrite Negative (NEG)    


 


Urine Bilirubin Negative (NEG)    


 


Urine Urobilinogen Dipstick


 0.2 mg/dL (0.2


mg/dL) 


 


 





 


Urine Leukocyte Esterase Negative (NEG)    


 


Urine RBC Occ /HPF (0-2)    


 


Urine WBC Occ /HPF (0-4)    


 


Urine Squamous Epithelial


Cells None /LPF 


 


 


 





 


Urine Bacteria 0 /HPF (0-FEW)    


 


Urine Hyaline Casts Moderate /HPF    


 


Urine Mucus Marked /LPF    


 


Sodium Level


 


 143 mmol/L


(136-145) 


 





 


Potassium Level


 


 4.7 mmol/L


(3.5-5.1) 


 





 


Chloride Level


 


 109 mmol/L


() 


 





 


Carbon Dioxide Level


 


 26 mmol/L


(21-32) 


 





 


Anion Gap  8 (6-14)   


 


Blood Urea Nitrogen


 


 32 mg/dL


(8-26) 


 





 


Creatinine


 


 1.2 mg/dL


(0.7-1.3) 


 





 


Estimated GFR


(Cockcroft-Gault) 


 59.9 


 


 





 


Glucose Level


 


 179 mg/dL


(70-99) 


 





 


Calcium Level


 


 8.0 mg/dL


(8.5-10.1) 


 





 


White Blood Count


 


 


 5.1 x10^3/uL


(4.0-11.0) 





 


Red Blood Count


 


 


 4.45 x10^6/uL


(4.30-5.70) 





 


Hemoglobin


 


 


 12.8 g/dL


(13.0-17.5) 





 


Hematocrit


 


 


 39.3 %


(39.0-53.0) 





 


Mean Corpuscular Volume   88 fL ()  


 


Mean Corpuscular Hemoglobin   29 pg (25-35)  


 


Mean Corpuscular Hemoglobin


Concent 


 


 33 g/dL


(31-37) 





 


Red Cell Distribution Width


 


 


 17.7 %


(11.5-14.5) 





 


Platelet Count


 


 


 112 x10^3/uL


(140-400) 





 


Neutrophils (%) (Auto)   91 % (31-73)  


 


Lymphocytes (%) (Auto)   4 % (24-48)  


 


Monocytes (%) (Auto)   4 % (0-9)  


 


Eosinophils (%) (Auto)   0 % (0-3)  


 


Basophils (%) (Auto)   0 % (0-3)  


 


Neutrophils # (Auto)


 


 


 4.6 x10^3uL


(1.8-7.7) 





 


Lymphocytes # (Auto)


 


 


 0.2 x10^3/uL


(1.0-4.8) 





 


Monocytes # (Auto)


 


 


 0.2 x10^3/uL


(0.0-1.1) 





 


Eosinophils # (Auto)


 


 


 0.0 x10^3/uL


(0.0-0.7) 





 


Basophils # (Auto)


 


 


 0.0 x10^3/uL


(0.0-0.2) 





 


O2 Saturation    95 % (92-99) 


 


Arterial Blood pH


 


 


 


 7.27


(7.35-7.45)


 


Arterial Blood pCO2 at


Patient Temp 


 


 


 51 mmHg


(35-46)


 


Arterial Blood pO2 at Patient


Temp 


 


 


 83 mmHg


()


 


Arterial Blood HCO3


 


 


 


 23 mmol/L


(21-28)


 


Arterial Blood Base Excess


 


 


 


 -4 mmol/L


(-3-3)


 


FiO2    40 


 


Test


 5/6/19


12:28 5/7/19


07:35 


 





 


O2 Saturation 95 % (92-99)  95 % (92-99)   


 


Arterial Blood pH


 7.29


(7.35-7.45) 7.32


(7.35-7.45) 


 





 


Arterial Blood pCO2 at


Patient Temp 58 mmHg


(35-46) 56 mmHg


(35-46) 


 





 


Arterial Blood pO2 at Patient


Temp 83 mmHg


() 80 mmHg


() 


 





 


Arterial Blood HCO3


 27 mmol/L


(21-28) 28 mmol/L


(21-28) 


 





 


Arterial Blood Base Excess


 -1 mmol/L


(-3-3) 1 mmol/L


(-3-3) 


 





 


FiO2 40  40   








Laboratory Tests








Test


 5/6/19


12:28 5/7/19


07:35


 


O2 Saturation 95 % (92-99)  95 % (92-99) 


 


Arterial Blood pH


 7.29


(7.35-7.45) 7.32


(7.35-7.45)


 


Arterial Blood pCO2 at


Patient Temp 58 mmHg


(35-46) 56 mmHg


(35-46)


 


Arterial Blood pO2 at Patient


Temp 83 mmHg


() 80 mmHg


()


 


Arterial Blood HCO3


 27 mmol/L


(21-28) 28 mmol/L


(21-28)


 


Arterial Blood Base Excess


 -1 mmol/L


(-3-3) 1 mmol/L


(-3-3)


 


FiO2 40  40 








Medications





Active Scripts








 Medications  Dose


 Route/Sig


 Max Daily Dose Days Date Category


 


 Prednisone 20 Mg


 Tablet  30 Mg


 PO DAILY


  5 3/6/19 Reported


 


 Levaquin


  (Levofloxacin)


 500 Mg Tablet  1 Tab


 PO DAILY


  10 3/6/19 Reported


 


 Ibuprofen 400 Mg


 Tablet  400 Mg


 PO PRN Q6HRS PRN


   2/9/18 Reported


 


 Duoneb 0.5-3(2.5)


 Mg/3 Ml


  (Albuterol/Ipratropium)


 3 Ml Ampul.neb  3 Ml


 NEB QID


   2/9/18 Reported


 


 Aspir-Low


  (Aspirin) 81 Mg


 Tablet.dr  1 Tab


 PO DAILY


   4/21/17 Reported


 


 Ventolin Hfa


 Inhaler


  (Albuterol


 Sulfate) 18 Gm


 Hfa.aer.ad  


 


   4/21/17 Reported


 


 Losartan-Hctz


 50-12.5 Mg Tab


  (Losartan/Hydrochlorothiazide)


 1 Each Tablet  1 Tab


 PO DAILY


   4/21/17 Reported


 


 Simvastatin 40 Mg


 Tablet  40 Mg


 PO HS


   6/4/15 Reported


 


 Clopidogrel


  (Clopidogrel


 Bisulfate) 75 Mg


 Tablet  75 Mg


 PO DAILY


   6/4/15 Reported











Impression


.








IMPRESSION:


1.  Acute-on-chronic hypoxic and hypercapnic respiratory failure


2.  Abnormal chest x-ray with bilateral interstitial infiltrates


3.  Possible a/c diastolic heart failure


4.  Abnormal D-dimer clinical suspicion for PE/DVT low


5.  Azotemia.


6.  Septic shock pt with tachycardia lactic acidosis and abnormal cxr


7.  Abnormal ABG with persistent acidosis


8.  Protein malnutrition POA moderate to severe


9.  S/P BRONCH ON 5/6











VENOUS DOPPLER 5/6


IMPRESSION:   There is no sonographic evidence of deep vein thrombosis in 


either lower extremity. 





IMPRESSION: 5/6


1. No CT evidence of central pulmonary emboli.


2. Emphysema.


3. Worsening tree-in-bud and interstitial opacities predominantly in the 


lower chest with mild confluent infiltrate posteriorly in the right lower 


lobe. An inflammatory/infectious etiology is most likely.





Plan


.


DIAGNOSTIC BRONCH YESTERDAY BAL SO FAR NEGATIVE


AC MODE


ANTIBX


ENTERAL NUTRITION


DVT AND GI PROPH


D/W WIFE AT BEDSIDE


BD


CONSULT DIETICIAN





CCT 35 MIN D/W WIFE











ERNESTINA MIGUEL MD               May 7, 2019 09:01

## 2019-05-08 VITALS — DIASTOLIC BLOOD PRESSURE: 66 MMHG | SYSTOLIC BLOOD PRESSURE: 115 MMHG

## 2019-05-08 VITALS — DIASTOLIC BLOOD PRESSURE: 52 MMHG | SYSTOLIC BLOOD PRESSURE: 101 MMHG

## 2019-05-08 VITALS — DIASTOLIC BLOOD PRESSURE: 72 MMHG | SYSTOLIC BLOOD PRESSURE: 121 MMHG

## 2019-05-08 VITALS — SYSTOLIC BLOOD PRESSURE: 117 MMHG | DIASTOLIC BLOOD PRESSURE: 62 MMHG

## 2019-05-08 VITALS — SYSTOLIC BLOOD PRESSURE: 130 MMHG | DIASTOLIC BLOOD PRESSURE: 61 MMHG

## 2019-05-08 VITALS — SYSTOLIC BLOOD PRESSURE: 100 MMHG | DIASTOLIC BLOOD PRESSURE: 55 MMHG

## 2019-05-08 VITALS — SYSTOLIC BLOOD PRESSURE: 116 MMHG | DIASTOLIC BLOOD PRESSURE: 52 MMHG

## 2019-05-08 VITALS — SYSTOLIC BLOOD PRESSURE: 129 MMHG | DIASTOLIC BLOOD PRESSURE: 66 MMHG

## 2019-05-08 VITALS — SYSTOLIC BLOOD PRESSURE: 119 MMHG | DIASTOLIC BLOOD PRESSURE: 61 MMHG

## 2019-05-08 VITALS — DIASTOLIC BLOOD PRESSURE: 61 MMHG | SYSTOLIC BLOOD PRESSURE: 108 MMHG

## 2019-05-08 VITALS — DIASTOLIC BLOOD PRESSURE: 57 MMHG | SYSTOLIC BLOOD PRESSURE: 123 MMHG

## 2019-05-08 VITALS — SYSTOLIC BLOOD PRESSURE: 103 MMHG | DIASTOLIC BLOOD PRESSURE: 57 MMHG

## 2019-05-08 VITALS — DIASTOLIC BLOOD PRESSURE: 72 MMHG | SYSTOLIC BLOOD PRESSURE: 132 MMHG

## 2019-05-08 VITALS — SYSTOLIC BLOOD PRESSURE: 99 MMHG | DIASTOLIC BLOOD PRESSURE: 53 MMHG

## 2019-05-08 VITALS — DIASTOLIC BLOOD PRESSURE: 59 MMHG | SYSTOLIC BLOOD PRESSURE: 112 MMHG

## 2019-05-08 VITALS — DIASTOLIC BLOOD PRESSURE: 64 MMHG | SYSTOLIC BLOOD PRESSURE: 114 MMHG

## 2019-05-08 VITALS — SYSTOLIC BLOOD PRESSURE: 117 MMHG | DIASTOLIC BLOOD PRESSURE: 56 MMHG

## 2019-05-08 VITALS — DIASTOLIC BLOOD PRESSURE: 55 MMHG | SYSTOLIC BLOOD PRESSURE: 100 MMHG

## 2019-05-08 VITALS — SYSTOLIC BLOOD PRESSURE: 123 MMHG | DIASTOLIC BLOOD PRESSURE: 72 MMHG

## 2019-05-08 VITALS — DIASTOLIC BLOOD PRESSURE: 71 MMHG | SYSTOLIC BLOOD PRESSURE: 121 MMHG

## 2019-05-08 VITALS — SYSTOLIC BLOOD PRESSURE: 114 MMHG | DIASTOLIC BLOOD PRESSURE: 60 MMHG

## 2019-05-08 VITALS — DIASTOLIC BLOOD PRESSURE: 56 MMHG | SYSTOLIC BLOOD PRESSURE: 109 MMHG

## 2019-05-08 VITALS — SYSTOLIC BLOOD PRESSURE: 115 MMHG | DIASTOLIC BLOOD PRESSURE: 59 MMHG

## 2019-05-08 VITALS — DIASTOLIC BLOOD PRESSURE: 67 MMHG | SYSTOLIC BLOOD PRESSURE: 120 MMHG

## 2019-05-08 VITALS — SYSTOLIC BLOOD PRESSURE: 113 MMHG | DIASTOLIC BLOOD PRESSURE: 70 MMHG

## 2019-05-08 LAB
ANION GAP SERPL CALC-SCNC: 3 MMOL/L (ref 6–14)
BASE EXCESS ABG: 2 MMOL/L (ref -3–3)
BUN SERPL-MCNC: 44 MG/DL (ref 8–26)
CALCIUM SERPL-MCNC: 8.5 MG/DL (ref 8.5–10.1)
CHLORIDE SERPL-SCNC: 112 MMOL/L (ref 98–107)
CO2 SERPL-SCNC: 31 MMOL/L (ref 21–32)
CREAT SERPL-MCNC: 0.9 MG/DL (ref 0.7–1.3)
GFR SERPLBLD BASED ON 1.73 SQ M-ARVRAT: 83.4 ML/MIN
GLUCOSE SERPL-MCNC: 200 MG/DL (ref 70–99)
HCO3 BLDA-SCNC: 30 MMOL/L (ref 21–28)
INSPIRATION SETTING TIME VENT: 40
PCO2 BLDA: 65 MMHG (ref 35–46)
PO2 BLDA: 88 MMHG (ref 65–108)
POTASSIUM SERPL-SCNC: 4.5 MMOL/L (ref 3.5–5.1)
SAO2 % BLDA: 95 % (ref 92–99)
SODIUM SERPL-SCNC: 146 MMOL/L (ref 136–145)

## 2019-05-08 RX ADMIN — AZITHROMYCIN MONOHYDRATE SCH MLS/HR: 500 INJECTION, POWDER, LYOPHILIZED, FOR SOLUTION INTRAVENOUS at 08:37

## 2019-05-08 RX ADMIN — METHYLPREDNISOLONE SODIUM SUCCINATE SCH MG: 40 INJECTION, POWDER, FOR SOLUTION INTRAMUSCULAR; INTRAVENOUS at 13:41

## 2019-05-08 RX ADMIN — IPRATROPIUM BROMIDE AND ALBUTEROL SULFATE SCH ML: .5; 3 SOLUTION RESPIRATORY (INHALATION) at 12:06

## 2019-05-08 RX ADMIN — ENOXAPARIN SODIUM SCH MG: 40 INJECTION SUBCUTANEOUS at 08:36

## 2019-05-08 RX ADMIN — IPRATROPIUM BROMIDE AND ALBUTEROL SULFATE SCH ML: .5; 3 SOLUTION RESPIRATORY (INHALATION) at 16:08

## 2019-05-08 RX ADMIN — FAMOTIDINE SCH MG: 10 INJECTION, SOLUTION INTRAVENOUS at 08:34

## 2019-05-08 RX ADMIN — ASPIRIN 81 MG SCH MG: 81 TABLET ORAL at 08:32

## 2019-05-08 RX ADMIN — SIMVASTATIN SCH MG: 40 TABLET, FILM COATED ORAL at 20:32

## 2019-05-08 RX ADMIN — MIDAZOLAM PRN MLS/HR: 5 INJECTION, SOLUTION INTRAMUSCULAR; INTRAVENOUS at 19:44

## 2019-05-08 RX ADMIN — IPRATROPIUM BROMIDE AND ALBUTEROL SULFATE SCH ML: .5; 3 SOLUTION RESPIRATORY (INHALATION) at 08:14

## 2019-05-08 RX ADMIN — CEFTRIAXONE SCH GM: 1 INJECTION, POWDER, FOR SOLUTION INTRAMUSCULAR; INTRAVENOUS at 07:42

## 2019-05-08 RX ADMIN — CLOPIDOGREL BISULFATE SCH MG: 75 TABLET ORAL at 08:32

## 2019-05-08 RX ADMIN — METHYLPREDNISOLONE SODIUM SUCCINATE SCH MG: 40 INJECTION, POWDER, FOR SOLUTION INTRAMUSCULAR; INTRAVENOUS at 22:10

## 2019-05-08 RX ADMIN — METHYLPREDNISOLONE SODIUM SUCCINATE SCH MG: 40 INJECTION, POWDER, FOR SOLUTION INTRAMUSCULAR; INTRAVENOUS at 05:48

## 2019-05-08 RX ADMIN — FAMOTIDINE SCH MG: 10 INJECTION, SOLUTION INTRAVENOUS at 20:32

## 2019-05-08 RX ADMIN — IPRATROPIUM BROMIDE AND ALBUTEROL SULFATE SCH ML: .5; 3 SOLUTION RESPIRATORY (INHALATION) at 20:18

## 2019-05-08 RX ADMIN — LOSARTAN POTASSIUM SCH MG: 50 TABLET ORAL at 08:32

## 2019-05-08 NOTE — RAD
Portable chest, 5/8/2019:

 

HISTORY: Respiratory failure

 

Comparison is made to yesterday's study. The ET tube tip lies well above 

the milly. An NG tube extends into the stomach. The heart size is normal.

There is mild unchanged prominence of the basilar pulmonary markings. A 

density in the left base laterally represents a combination of a small 

focal diaphragmatic eventration and adjacent atelectasis. No significant 

pleural fluid or pneumothorax is seen. No new abnormality is detected.

 

IMPRESSION: No significant change since yesterday's study

 

Electronically signed by: Rick Moritz, MD (5/8/2019 7:51 AM) [facility]

## 2019-05-08 NOTE — PDOC
PULMONARY PROGRESS NOTES


Subjective


PT INTUBATED


SEDATED ON AC MODE


Vitals





Vital Signs








  Date Time  Temp Pulse Resp B/P (MAP) Pulse Ox O2 Delivery O2 Flow Rate FiO2


 


5/8/19 08:34   20  100 Ventilator  


 


5/8/19 08:32  44  132/72    


 


5/8/19 04:00 97.7       





 97.7       


 


5/7/19 10:40       4.0 








Lungs:  Crackles


Cardiovascular:  S1, S2


Abdomen:  Soft


Extremities:  No Edema


Skin:  Warm


Labs





Laboratory Tests








Test


 5/6/19


12:28 5/7/19


07:35 5/7/19


08:55 5/8/19


00:20


 


O2 Saturation 95 % (92-99)  95 % (92-99)   


 


Arterial Blood pH


 7.29


(7.35-7.45) 7.32


(7.35-7.45) 


 





 


Arterial Blood pCO2 at


Patient Temp 58 mmHg


(35-46) 56 mmHg


(35-46) 


 





 


Arterial Blood pO2 at Patient


Temp 83 mmHg


() 80 mmHg


() 


 





 


Arterial Blood HCO3


 27 mmol/L


(21-28) 28 mmol/L


(21-28) 


 





 


Arterial Blood Base Excess


 -1 mmol/L


(-3-3) 1 mmol/L


(-3-3) 


 





 


FiO2 40  40   


 


White Blood Count


 


 


 5.8 x10^3/uL


(4.0-11.0) 





 


Red Blood Count


 


 


 4.18 x10^6/uL


(4.30-5.70) 





 


Hemoglobin


 


 


 11.7 g/dL


(13.0-17.5) 





 


Hematocrit


 


 


 36.8 %


(39.0-53.0) 





 


Mean Corpuscular Volume   88 fL ()  


 


Mean Corpuscular Hemoglobin   28 pg (25-35)  


 


Mean Corpuscular Hemoglobin


Concent 


 


 32 g/dL


(31-37) 





 


Red Cell Distribution Width


 


 


 17.8 %


(11.5-14.5) 





 


Platelet Count


 


 


 111 x10^3/uL


(140-400) 





 


Sodium Level


 


 


 145 mmol/L


(136-145) 





 


Potassium Level


 


 


 4.3 mmol/L


(3.5-5.1) 





 


Chloride Level


 


 


 111 mmol/L


() 





 


Carbon Dioxide Level


 


 


 30 mmol/L


(21-32) 





 


Anion Gap   4 (6-14)  


 


Blood Urea Nitrogen


 


 


 40 mg/dL


(8-26) 





 


Creatinine


 


 


 1.1 mg/dL


(0.7-1.3) 





 


Estimated GFR


(Cockcroft-Gault) 


 


 66.2 


 





 


Glucose Level


 


 


 206 mg/dL


(70-99) 





 


Calcium Level


 


 


 8.3 mg/dL


(8.5-10.1) 





 


Glucose (Fingerstick)


 


 


 


 159 mg/dL


(70-99)


 


Test


 5/8/19


04:10 5/8/19


06:55 5/8/19


08:25 





 


Sodium Level


 146 mmol/L


(136-145) 


 


 





 


Potassium Level


 4.5 mmol/L


(3.5-5.1) 


 


 





 


Chloride Level


 112 mmol/L


() 


 


 





 


Carbon Dioxide Level


 31 mmol/L


(21-32) 


 


 





 


Anion Gap 3 (6-14)    


 


Blood Urea Nitrogen


 44 mg/dL


(8-26) 


 


 





 


Creatinine


 0.9 mg/dL


(0.7-1.3) 


 


 





 


Estimated GFR


(Cockcroft-Gault) 83.4 


 


 


 





 


Glucose Level


 200 mg/dL


(70-99) 


 


 





 


Calcium Level


 8.5 mg/dL


(8.5-10.1) 


 


 





 


Glucose (Fingerstick)


 


 168 mg/dL


(70-99) 


 





 


O2 Saturation   95 % (92-99)  


 


Arterial Blood pH


 


 


 7.29


(7.35-7.45) 





 


Arterial Blood pCO2 at


Patient Temp 


 


 65 mmHg


(35-46) 





 


Arterial Blood pO2 at Patient


Temp 


 


 88 mmHg


() 





 


Arterial Blood HCO3


 


 


 30 mmol/L


(21-28) 





 


Arterial Blood Base Excess


 


 


 2 mmol/L


(-3-3) 





 


FiO2   40  








Laboratory Tests








Test


 5/8/19


00:20 5/8/19


04:10 5/8/19


06:55 5/8/19


08:25


 


Glucose (Fingerstick)


 159 mg/dL


(70-99) 


 168 mg/dL


(70-99) 





 


Sodium Level


 


 146 mmol/L


(136-145) 


 





 


Potassium Level


 


 4.5 mmol/L


(3.5-5.1) 


 





 


Chloride Level


 


 112 mmol/L


() 


 





 


Carbon Dioxide Level


 


 31 mmol/L


(21-32) 


 





 


Anion Gap  3 (6-14)   


 


Blood Urea Nitrogen


 


 44 mg/dL


(8-26) 


 





 


Creatinine


 


 0.9 mg/dL


(0.7-1.3) 


 





 


Estimated GFR


(Cockcroft-Gault) 


 83.4 


 


 





 


Glucose Level


 


 200 mg/dL


(70-99) 


 





 


Calcium Level


 


 8.5 mg/dL


(8.5-10.1) 


 





 


O2 Saturation    95 % (92-99) 


 


Arterial Blood pH


 


 


 


 7.29


(7.35-7.45)


 


Arterial Blood pCO2 at


Patient Temp 


 


 


 65 mmHg


(35-46)


 


Arterial Blood pO2 at Patient


Temp 


 


 


 88 mmHg


()


 


Arterial Blood HCO3


 


 


 


 30 mmol/L


(21-28)


 


Arterial Blood Base Excess


 


 


 


 2 mmol/L


(-3-3)


 


FiO2    40 








Medications





Active Scripts








 Medications  Dose


 Route/Sig


 Max Daily Dose Days Date Category


 


 Prednisone 20 Mg


 Tablet  30 Mg


 PO DAILY


  5 3/6/19 Reported


 


 Levaquin


  (Levofloxacin)


 500 Mg Tablet  1 Tab


 PO DAILY


  10 3/6/19 Reported


 


 Ibuprofen 400 Mg


 Tablet  400 Mg


 PO PRN Q6HRS PRN


   2/9/18 Reported


 


 Duoneb 0.5-3(2.5)


 Mg/3 Ml


  (Albuterol/Ipratropium)


 3 Ml Ampul.neb  3 Ml


 NEB QID


   2/9/18 Reported


 


 Aspir-Low


  (Aspirin) 81 Mg


 Tablet.dr  1 Tab


 PO DAILY


   4/21/17 Reported


 


 Ventolin Hfa


 Inhaler


  (Albuterol


 Sulfate) 18 Gm


 Hfa.aer.ad  


 


   4/21/17 Reported


 


 Losartan-Hctz


 50-12.5 Mg Tab


  (Losartan/Hydrochlorothiazide)


 1 Each Tablet  1 Tab


 PO DAILY


   4/21/17 Reported


 


 Simvastatin 40 Mg


 Tablet  40 Mg


 PO HS


   6/4/15 Reported


 


 Clopidogrel


  (Clopidogrel


 Bisulfate) 75 Mg


 Tablet  75 Mg


 PO DAILY


   6/4/15 Reported











Impression


.








IMPRESSION:


1.  Acute-on-chronic hypoxic and hypercapnic respiratory failure


2.  Abnormal chest x-ray with bilateral interstitial infiltrates


3.  Possible a/c diastolic heart failure


4.  Abnormal D-dimer clinical suspicion for PE/DVT low


5.  Azotemia.


6.  Septic shock pt with tachycardia lactic acidosis and abnormal cxr


7.  Abnormal ABG with persistent acidosis


8.  Protein malnutrition POA moderate to severe


9.  S/P BRONCH ON 5/6











VENOUS DOPPLER 5/6


IMPRESSION:   There is no sonographic evidence of deep vein thrombosis in 


either lower extremity. 





IMPRESSION: 5/6


1. No CT evidence of central pulmonary emboli.


2. Emphysema.


3. Worsening tree-in-bud and interstitial opacities predominantly in the 


lower chest with mild confluent infiltrate posteriorly in the right lower 


lobe. An inflammatory/infectious etiology is most likely.





Plan


.


DIAGNOSTIC BRONCH YESTERDAY BAL SO FAR NEGATIVE


AC MODEFOR NOW


ANTIBX


ENTERAL NUTRITION


DVT AND GI PROPH





BD


CONSULT DIETICIAN





CCT 30 MIN











ERNESTINA MIGUEL MD               May 8, 2019 09:06

## 2019-05-08 NOTE — PDOC
Provider Note


Provider Note


vss, no temp- still on vent, cults neg- platelets stable 111K- cont same regimen











GUS CHERY MD              May 8, 2019 08:45

## 2019-05-08 NOTE — NUR
SS following up with discharge planning. Pt continues on the vent. No discharge needs or 
recommendations noted at this time. SS will continue to follow for discharge planning.

## 2019-05-09 VITALS — DIASTOLIC BLOOD PRESSURE: 56 MMHG | SYSTOLIC BLOOD PRESSURE: 112 MMHG

## 2019-05-09 VITALS — DIASTOLIC BLOOD PRESSURE: 58 MMHG | SYSTOLIC BLOOD PRESSURE: 110 MMHG

## 2019-05-09 VITALS — DIASTOLIC BLOOD PRESSURE: 60 MMHG | SYSTOLIC BLOOD PRESSURE: 121 MMHG

## 2019-05-09 VITALS — SYSTOLIC BLOOD PRESSURE: 114 MMHG | DIASTOLIC BLOOD PRESSURE: 54 MMHG

## 2019-05-09 VITALS — SYSTOLIC BLOOD PRESSURE: 112 MMHG | DIASTOLIC BLOOD PRESSURE: 57 MMHG

## 2019-05-09 VITALS — DIASTOLIC BLOOD PRESSURE: 69 MMHG | SYSTOLIC BLOOD PRESSURE: 132 MMHG

## 2019-05-09 VITALS — SYSTOLIC BLOOD PRESSURE: 119 MMHG | DIASTOLIC BLOOD PRESSURE: 62 MMHG

## 2019-05-09 VITALS — SYSTOLIC BLOOD PRESSURE: 110 MMHG | DIASTOLIC BLOOD PRESSURE: 54 MMHG

## 2019-05-09 VITALS — SYSTOLIC BLOOD PRESSURE: 127 MMHG | DIASTOLIC BLOOD PRESSURE: 58 MMHG

## 2019-05-09 VITALS — SYSTOLIC BLOOD PRESSURE: 116 MMHG | DIASTOLIC BLOOD PRESSURE: 56 MMHG

## 2019-05-09 VITALS — SYSTOLIC BLOOD PRESSURE: 110 MMHG | DIASTOLIC BLOOD PRESSURE: 61 MMHG

## 2019-05-09 VITALS — DIASTOLIC BLOOD PRESSURE: 56 MMHG | SYSTOLIC BLOOD PRESSURE: 107 MMHG

## 2019-05-09 VITALS — SYSTOLIC BLOOD PRESSURE: 100 MMHG | DIASTOLIC BLOOD PRESSURE: 52 MMHG

## 2019-05-09 VITALS — SYSTOLIC BLOOD PRESSURE: 111 MMHG | DIASTOLIC BLOOD PRESSURE: 56 MMHG

## 2019-05-09 VITALS — DIASTOLIC BLOOD PRESSURE: 59 MMHG | SYSTOLIC BLOOD PRESSURE: 115 MMHG

## 2019-05-09 VITALS — SYSTOLIC BLOOD PRESSURE: 111 MMHG | DIASTOLIC BLOOD PRESSURE: 60 MMHG

## 2019-05-09 VITALS — DIASTOLIC BLOOD PRESSURE: 59 MMHG | SYSTOLIC BLOOD PRESSURE: 111 MMHG

## 2019-05-09 VITALS — DIASTOLIC BLOOD PRESSURE: 61 MMHG | SYSTOLIC BLOOD PRESSURE: 125 MMHG

## 2019-05-09 VITALS — DIASTOLIC BLOOD PRESSURE: 56 MMHG | SYSTOLIC BLOOD PRESSURE: 105 MMHG

## 2019-05-09 VITALS — DIASTOLIC BLOOD PRESSURE: 57 MMHG | SYSTOLIC BLOOD PRESSURE: 116 MMHG

## 2019-05-09 VITALS — DIASTOLIC BLOOD PRESSURE: 54 MMHG | SYSTOLIC BLOOD PRESSURE: 128 MMHG

## 2019-05-09 VITALS — DIASTOLIC BLOOD PRESSURE: 74 MMHG | SYSTOLIC BLOOD PRESSURE: 135 MMHG

## 2019-05-09 VITALS — SYSTOLIC BLOOD PRESSURE: 110 MMHG | DIASTOLIC BLOOD PRESSURE: 56 MMHG

## 2019-05-09 LAB
ANION GAP SERPL CALC-SCNC: 4 MMOL/L (ref 6–14)
BASE EXCESS ABG: 5 MMOL/L (ref -3–3)
BASOPHILS # BLD AUTO: 0 X10^3/UL (ref 0–0.2)
BASOPHILS NFR BLD: 0 % (ref 0–3)
BUN SERPL-MCNC: 43 MG/DL (ref 8–26)
CALCIUM SERPL-MCNC: 8.4 MG/DL (ref 8.5–10.1)
CHLORIDE SERPL-SCNC: 111 MMOL/L (ref 98–107)
CO2 SERPL-SCNC: 32 MMOL/L (ref 21–32)
CREAT SERPL-MCNC: 0.9 MG/DL (ref 0.7–1.3)
EOSINOPHIL NFR BLD: 0 % (ref 0–3)
EOSINOPHIL NFR BLD: 0 X10^3/UL (ref 0–0.7)
ERYTHROCYTE [DISTWIDTH] IN BLOOD BY AUTOMATED COUNT: 19 % (ref 11.5–14.5)
GFR SERPLBLD BASED ON 1.73 SQ M-ARVRAT: 83.4 ML/MIN
GLUCOSE SERPL-MCNC: 182 MG/DL (ref 70–99)
HCO3 BLDA-SCNC: 33 MMOL/L (ref 21–28)
HCT VFR BLD CALC: 35 % (ref 39–53)
HGB BLD-MCNC: 11.2 G/DL (ref 13–17.5)
INSPIRATION SETTING TIME VENT: 40
LYMPHOCYTES # BLD: 0.2 X10^3/UL (ref 1–4.8)
LYMPHOCYTES NFR BLD AUTO: 2 % (ref 24–48)
MCH RBC QN AUTO: 28 PG (ref 25–35)
MCHC RBC AUTO-ENTMCNC: 32 G/DL (ref 31–37)
MCV RBC AUTO: 88 FL (ref 79–100)
MONO #: 0.4 X10^3/UL (ref 0–1.1)
MONOCYTES NFR BLD: 4 % (ref 0–9)
NEUT #: 9.5 X10^3UL (ref 1.8–7.7)
NEUTROPHILS NFR BLD AUTO: 94 % (ref 31–73)
PCO2 BLDA: 62 MMHG (ref 35–46)
PLATELET # BLD AUTO: 138 X10^3/UL (ref 140–400)
PO2 BLDA: 89 MMHG (ref 65–108)
POTASSIUM SERPL-SCNC: 4.4 MMOL/L (ref 3.5–5.1)
RBC # BLD AUTO: 3.99 X10^6/UL (ref 4.3–5.7)
SAO2 % BLDA: 96 % (ref 92–99)
SODIUM SERPL-SCNC: 147 MMOL/L (ref 136–145)
WBC # BLD AUTO: 10.2 X10^3/UL (ref 4–11)

## 2019-05-09 RX ADMIN — CEFTRIAXONE SCH GM: 1 INJECTION, POWDER, FOR SOLUTION INTRAMUSCULAR; INTRAVENOUS at 09:42

## 2019-05-09 RX ADMIN — METHYLPREDNISOLONE SODIUM SUCCINATE SCH MG: 40 INJECTION, POWDER, FOR SOLUTION INTRAMUSCULAR; INTRAVENOUS at 21:30

## 2019-05-09 RX ADMIN — ASPIRIN 81 MG SCH MG: 81 TABLET ORAL at 09:18

## 2019-05-09 RX ADMIN — FAMOTIDINE SCH MG: 10 INJECTION, SOLUTION INTRAVENOUS at 09:20

## 2019-05-09 RX ADMIN — SIMVASTATIN SCH MG: 40 TABLET, FILM COATED ORAL at 21:29

## 2019-05-09 RX ADMIN — AZITHROMYCIN MONOHYDRATE SCH MLS/HR: 500 INJECTION, POWDER, LYOPHILIZED, FOR SOLUTION INTRAVENOUS at 09:42

## 2019-05-09 RX ADMIN — METHYLPREDNISOLONE SODIUM SUCCINATE SCH MG: 40 INJECTION, POWDER, FOR SOLUTION INTRAMUSCULAR; INTRAVENOUS at 14:57

## 2019-05-09 RX ADMIN — CLOPIDOGREL BISULFATE SCH MG: 75 TABLET ORAL at 09:19

## 2019-05-09 RX ADMIN — IPRATROPIUM BROMIDE AND ALBUTEROL SULFATE SCH ML: .5; 3 SOLUTION RESPIRATORY (INHALATION) at 12:48

## 2019-05-09 RX ADMIN — LOSARTAN POTASSIUM SCH MG: 50 TABLET ORAL at 09:19

## 2019-05-09 RX ADMIN — IPRATROPIUM BROMIDE AND ALBUTEROL SULFATE SCH ML: .5; 3 SOLUTION RESPIRATORY (INHALATION) at 08:24

## 2019-05-09 RX ADMIN — ENOXAPARIN SODIUM SCH MG: 40 INJECTION SUBCUTANEOUS at 09:19

## 2019-05-09 RX ADMIN — FAMOTIDINE SCH MG: 10 INJECTION, SOLUTION INTRAVENOUS at 21:29

## 2019-05-09 RX ADMIN — METHYLPREDNISOLONE SODIUM SUCCINATE SCH MG: 40 INJECTION, POWDER, FOR SOLUTION INTRAMUSCULAR; INTRAVENOUS at 05:20

## 2019-05-09 RX ADMIN — IPRATROPIUM BROMIDE AND ALBUTEROL SULFATE SCH ML: .5; 3 SOLUTION RESPIRATORY (INHALATION) at 19:52

## 2019-05-09 RX ADMIN — MIDAZOLAM PRN MLS/HR: 5 INJECTION, SOLUTION INTRAMUSCULAR; INTRAVENOUS at 21:45

## 2019-05-09 RX ADMIN — IPRATROPIUM BROMIDE AND ALBUTEROL SULFATE SCH ML: .5; 3 SOLUTION RESPIRATORY (INHALATION) at 15:30

## 2019-05-09 NOTE — NUR
Last bowel movement for patient was on 5/5with a small smear on 5/8, patient continues to 
have tube feeding going at 55CC/HR and patient does have Versed and Fentanyl infusing for 
sedation, bowel sounds are now hypoactive. Paged Dr Carmona, retuned page, notified of 
above, orders received for Dulcolax suppository X1 now, and if no response by morning,  give 
60 cc's of MOM, and Miralax 1 PKG PRN daily. See orders.

## 2019-05-09 NOTE — PDOC
PULMONARY PROGRESS NOTES


Subjective


PT INTUBATED


SEDATED ON AC MODE


Vitals





Vital Signs








  Date Time  Temp Pulse Resp B/P (MAP) Pulse Ox O2 Delivery O2 Flow Rate FiO2


 


5/9/19 08:25     97 Ventilator  


 


5/9/19 06:09   20     


 


5/9/19 06:00  51  105/56 (72)    


 


5/9/19 04:00 99.0       





 99.0       








Lungs:  Crackles


Cardiovascular:  S1, S2


Abdomen:  Soft


Extremities:  No Edema


Skin:  Warm


Labs





Laboratory Tests








Test


 5/7/19


08:55 5/8/19


00:20 5/8/19


04:10 5/8/19


06:55


 


White Blood Count


 5.8 x10^3/uL


(4.0-11.0) 


 


 





 


Red Blood Count


 4.18 x10^6/uL


(4.30-5.70) 


 


 





 


Hemoglobin


 11.7 g/dL


(13.0-17.5) 


 


 





 


Hematocrit


 36.8 %


(39.0-53.0) 


 


 





 


Mean Corpuscular Volume 88 fL ()    


 


Mean Corpuscular Hemoglobin 28 pg (25-35)    


 


Mean Corpuscular Hemoglobin


Concent 32 g/dL


(31-37) 


 


 





 


Red Cell Distribution Width


 17.8 %


(11.5-14.5) 


 


 





 


Platelet Count


 111 x10^3/uL


(140-400) 


 


 





 


Sodium Level


 145 mmol/L


(136-145) 


 146 mmol/L


(136-145) 





 


Potassium Level


 4.3 mmol/L


(3.5-5.1) 


 4.5 mmol/L


(3.5-5.1) 





 


Chloride Level


 111 mmol/L


() 


 112 mmol/L


() 





 


Carbon Dioxide Level


 30 mmol/L


(21-32) 


 31 mmol/L


(21-32) 





 


Anion Gap 4 (6-14)   3 (6-14)  


 


Blood Urea Nitrogen


 40 mg/dL


(8-26) 


 44 mg/dL


(8-26) 





 


Creatinine


 1.1 mg/dL


(0.7-1.3) 


 0.9 mg/dL


(0.7-1.3) 





 


Estimated GFR


(Cockcroft-Gault) 66.2 


 


 83.4 


 





 


Glucose Level


 206 mg/dL


(70-99) 


 200 mg/dL


(70-99) 





 


Calcium Level


 8.3 mg/dL


(8.5-10.1) 


 8.5 mg/dL


(8.5-10.1) 





 


Glucose (Fingerstick)


 


 159 mg/dL


(70-99) 


 168 mg/dL


(70-99)


 


Test


 5/8/19


08:25 5/8/19


17:36 5/9/19


00:00 5/9/19


04:54


 


O2 Saturation 95 % (92-99)    


 


Arterial Blood pH


 7.29


(7.35-7.45) 


 


 





 


Arterial Blood pCO2 at


Patient Temp 65 mmHg


(35-46) 


 


 





 


Arterial Blood pO2 at Patient


Temp 88 mmHg


() 


 


 





 


Arterial Blood HCO3


 30 mmol/L


(21-28) 


 


 





 


Arterial Blood Base Excess


 2 mmol/L


(-3-3) 


 


 





 


FiO2 40    


 


Glucose (Fingerstick)


 


 159 mg/dL


(70-99) 145 mg/dL


(70-99) 





 


White Blood Count


 


 


 


 10.2 x10^3/uL


(4.0-11.0)


 


Red Blood Count


 


 


 


 3.99 x10^6/uL


(4.30-5.70)


 


Hemoglobin


 


 


 


 11.2 g/dL


(13.0-17.5)


 


Hematocrit


 


 


 


 35.0 %


(39.0-53.0)


 


Mean Corpuscular Volume    88 fL () 


 


Mean Corpuscular Hemoglobin    28 pg (25-35) 


 


Mean Corpuscular Hemoglobin


Concent 


 


 


 32 g/dL


(31-37)


 


Red Cell Distribution Width


 


 


 


 19.0 %


(11.5-14.5)


 


Platelet Count


 


 


 


 138 x10^3/uL


(140-400)


 


Neutrophils (%) (Auto)    94 % (31-73) 


 


Lymphocytes (%) (Auto)    2 % (24-48) 


 


Monocytes (%) (Auto)    4 % (0-9) 


 


Eosinophils (%) (Auto)    0 % (0-3) 


 


Basophils (%) (Auto)    0 % (0-3) 


 


Neutrophils # (Auto)


 


 


 


 9.5 x10^3uL


(1.8-7.7)


 


Lymphocytes # (Auto)


 


 


 


 0.2 x10^3/uL


(1.0-4.8)


 


Monocytes # (Auto)


 


 


 


 0.4 x10^3/uL


(0.0-1.1)


 


Eosinophils # (Auto)


 


 


 


 0.0 x10^3/uL


(0.0-0.7)


 


Basophils # (Auto)


 


 


 


 0.0 x10^3/uL


(0.0-0.2)


 


Sodium Level


 


 


 


 147 mmol/L


(136-145)


 


Potassium Level


 


 


 


 4.4 mmol/L


(3.5-5.1)


 


Chloride Level


 


 


 


 111 mmol/L


()


 


Carbon Dioxide Level


 


 


 


 32 mmol/L


(21-32)


 


Anion Gap    4 (6-14) 


 


Blood Urea Nitrogen


 


 


 


 43 mg/dL


(8-26)


 


Creatinine


 


 


 


 0.9 mg/dL


(0.7-1.3)


 


Estimated GFR


(Cockcroft-Gault) 


 


 


 83.4 





 


Glucose Level


 


 


 


 182 mg/dL


(70-99)


 


Calcium Level


 


 


 


 8.4 mg/dL


(8.5-10.1)


 


Test


 5/9/19


05:57 


 


 





 


Glucose (Fingerstick)


 170 mg/dL


(70-99) 


 


 











Laboratory Tests








Test


 5/8/19


17:36 5/9/19


00:00 5/9/19


04:54 5/9/19


05:57


 


Glucose (Fingerstick)


 159 mg/dL


(70-99) 145 mg/dL


(70-99) 


 170 mg/dL


(70-99)


 


White Blood Count


 


 


 10.2 x10^3/uL


(4.0-11.0) 





 


Red Blood Count


 


 


 3.99 x10^6/uL


(4.30-5.70) 





 


Hemoglobin


 


 


 11.2 g/dL


(13.0-17.5) 





 


Hematocrit


 


 


 35.0 %


(39.0-53.0) 





 


Mean Corpuscular Volume   88 fL ()  


 


Mean Corpuscular Hemoglobin   28 pg (25-35)  


 


Mean Corpuscular Hemoglobin


Concent 


 


 32 g/dL


(31-37) 





 


Red Cell Distribution Width


 


 


 19.0 %


(11.5-14.5) 





 


Platelet Count


 


 


 138 x10^3/uL


(140-400) 





 


Neutrophils (%) (Auto)   94 % (31-73)  


 


Lymphocytes (%) (Auto)   2 % (24-48)  


 


Monocytes (%) (Auto)   4 % (0-9)  


 


Eosinophils (%) (Auto)   0 % (0-3)  


 


Basophils (%) (Auto)   0 % (0-3)  


 


Neutrophils # (Auto)


 


 


 9.5 x10^3uL


(1.8-7.7) 





 


Lymphocytes # (Auto)


 


 


 0.2 x10^3/uL


(1.0-4.8) 





 


Monocytes # (Auto)


 


 


 0.4 x10^3/uL


(0.0-1.1) 





 


Eosinophils # (Auto)


 


 


 0.0 x10^3/uL


(0.0-0.7) 





 


Basophils # (Auto)


 


 


 0.0 x10^3/uL


(0.0-0.2) 





 


Sodium Level


 


 


 147 mmol/L


(136-145) 





 


Potassium Level


 


 


 4.4 mmol/L


(3.5-5.1) 





 


Chloride Level


 


 


 111 mmol/L


() 





 


Carbon Dioxide Level


 


 


 32 mmol/L


(21-32) 





 


Anion Gap   4 (6-14)  


 


Blood Urea Nitrogen


 


 


 43 mg/dL


(8-26) 





 


Creatinine


 


 


 0.9 mg/dL


(0.7-1.3) 





 


Estimated GFR


(Cockcroft-Gault) 


 


 83.4 


 





 


Glucose Level


 


 


 182 mg/dL


(70-99) 





 


Calcium Level


 


 


 8.4 mg/dL


(8.5-10.1) 











Medications





Active Scripts








 Medications  Dose


 Route/Sig


 Max Daily Dose Days Date Category


 


 Prednisone 20 Mg


 Tablet  30 Mg


 PO DAILY


  5 3/6/19 Reported


 


 Levaquin


  (Levofloxacin)


 500 Mg Tablet  1 Tab


 PO DAILY


  10 3/6/19 Reported


 


 Ibuprofen 400 Mg


 Tablet  400 Mg


 PO PRN Q6HRS PRN


   2/9/18 Reported


 


 Duoneb 0.5-3(2.5)


 Mg/3 Ml


  (Albuterol/Ipratropium)


 3 Ml Ampul.neb  3 Ml


 NEB QID


   2/9/18 Reported


 


 Aspir-Low


  (Aspirin) 81 Mg


 Tablet.dr  1 Tab


 PO DAILY


   4/21/17 Reported


 


 Ventolin Hfa


 Inhaler


  (Albuterol


 Sulfate) 18 Gm


 Hfa.aer.ad  


 


   4/21/17 Reported


 


 Losartan-Hctz


 50-12.5 Mg Tab


  (Losartan/Hydrochlorothiazide)


 1 Each Tablet  1 Tab


 PO DAILY


   4/21/17 Reported


 


 Simvastatin 40 Mg


 Tablet  40 Mg


 PO HS


   6/4/15 Reported


 


 Clopidogrel


  (Clopidogrel


 Bisulfate) 75 Mg


 Tablet  75 Mg


 PO DAILY


   6/4/15 Reported











Impression


.








IMPRESSION:


1.  Acute-on-chronic hypoxic and hypercapnic respiratory failure


2.  Abnormal chest x-ray with bilateral interstitial infiltrates


3.  Possible a/c diastolic heart failure


4.  Abnormal D-dimer clinical suspicion for PE/DVT low


5.  Azotemia.


6.  Septic shock pt with tachycardia lactic acidosis and abnormal cxr


7.  Abnormal ABG with persistent acidosis


8.  Protein malnutrition POA moderate to severe


9.  S/P BRONCH ON 5/6











VENOUS DOPPLER 5/6


IMPRESSION:   There is no sonographic evidence of deep vein thrombosis in 


either lower extremity. 





IMPRESSION: 5/6


1. No CT evidence of central pulmonary emboli.


2. Emphysema.


3. Worsening tree-in-bud and interstitial opacities predominantly in the 


lower chest with mild confluent infiltrate posteriorly in the right lower 


lobe. An inflammatory/infectious etiology is most likely.





Plan


.


DIAGNOSTIC BRONCH SO FAR CULTURES NEGATIVE


D/W RN DECREASE SEDATION 


TRAIL OF PS


POSSIBLE EXTUBATE IN AM


ABG NOTE OK FOR PT HYPERCAPNIC 


ANTIBX


ENTERAL NUTRITION


DVT AND GI PROPH





BD








CCT 30 MIN











ERNESTINA MIGUEL MD               May 9, 2019 08:53

## 2019-05-09 NOTE — NUR
Sedation vacation  w/o success. Extremely anxious,thrashing w constant alarm of vent. Non 
responsive  to verbal calming. Order for precidex to start as needed to replace Versed 
during weaning process. Will attempt again in am w med changes

## 2019-05-09 NOTE — RAD
Portable chest, 5/9/2019:

 

HISTORY: Respiratory failure

 

Comparison is made yesterday study. The ET tube tip lies well above the 

milly. An NG tube extends into the stomach. The heart size is normal. 

There is mild unchanged prominence of the basilar pulmonary markings. No 

pleural fluid is seen. No new abnormality is detected.

 

IMPRESSION: No significant change since yesterday study.

 

Electronically signed by: Rick Moritz, MD (5/9/2019 7:39 AM) [facility]

## 2019-05-10 VITALS — DIASTOLIC BLOOD PRESSURE: 96 MMHG | SYSTOLIC BLOOD PRESSURE: 138 MMHG

## 2019-05-10 VITALS — SYSTOLIC BLOOD PRESSURE: 128 MMHG | DIASTOLIC BLOOD PRESSURE: 66 MMHG

## 2019-05-10 VITALS — DIASTOLIC BLOOD PRESSURE: 68 MMHG | SYSTOLIC BLOOD PRESSURE: 131 MMHG

## 2019-05-10 VITALS — DIASTOLIC BLOOD PRESSURE: 86 MMHG | SYSTOLIC BLOOD PRESSURE: 118 MMHG

## 2019-05-10 VITALS — SYSTOLIC BLOOD PRESSURE: 131 MMHG | DIASTOLIC BLOOD PRESSURE: 78 MMHG

## 2019-05-10 VITALS — SYSTOLIC BLOOD PRESSURE: 184 MMHG | DIASTOLIC BLOOD PRESSURE: 98 MMHG

## 2019-05-10 VITALS — DIASTOLIC BLOOD PRESSURE: 65 MMHG | SYSTOLIC BLOOD PRESSURE: 108 MMHG

## 2019-05-10 VITALS — DIASTOLIC BLOOD PRESSURE: 72 MMHG | SYSTOLIC BLOOD PRESSURE: 137 MMHG

## 2019-05-10 VITALS — DIASTOLIC BLOOD PRESSURE: 62 MMHG | SYSTOLIC BLOOD PRESSURE: 118 MMHG

## 2019-05-10 VITALS — DIASTOLIC BLOOD PRESSURE: 65 MMHG | SYSTOLIC BLOOD PRESSURE: 84 MMHG

## 2019-05-10 VITALS — DIASTOLIC BLOOD PRESSURE: 79 MMHG | SYSTOLIC BLOOD PRESSURE: 101 MMHG

## 2019-05-10 VITALS — DIASTOLIC BLOOD PRESSURE: 71 MMHG | SYSTOLIC BLOOD PRESSURE: 141 MMHG

## 2019-05-10 VITALS — SYSTOLIC BLOOD PRESSURE: 103 MMHG | DIASTOLIC BLOOD PRESSURE: 80 MMHG

## 2019-05-10 VITALS — SYSTOLIC BLOOD PRESSURE: 118 MMHG | DIASTOLIC BLOOD PRESSURE: 79 MMHG

## 2019-05-10 VITALS — SYSTOLIC BLOOD PRESSURE: 119 MMHG | DIASTOLIC BLOOD PRESSURE: 67 MMHG

## 2019-05-10 VITALS — DIASTOLIC BLOOD PRESSURE: 78 MMHG | SYSTOLIC BLOOD PRESSURE: 119 MMHG

## 2019-05-10 VITALS — SYSTOLIC BLOOD PRESSURE: 113 MMHG | DIASTOLIC BLOOD PRESSURE: 78 MMHG

## 2019-05-10 VITALS — DIASTOLIC BLOOD PRESSURE: 65 MMHG | SYSTOLIC BLOOD PRESSURE: 124 MMHG

## 2019-05-10 VITALS — SYSTOLIC BLOOD PRESSURE: 138 MMHG | DIASTOLIC BLOOD PRESSURE: 90 MMHG

## 2019-05-10 VITALS — SYSTOLIC BLOOD PRESSURE: 123 MMHG | DIASTOLIC BLOOD PRESSURE: 78 MMHG

## 2019-05-10 VITALS — DIASTOLIC BLOOD PRESSURE: 76 MMHG | SYSTOLIC BLOOD PRESSURE: 136 MMHG

## 2019-05-10 VITALS — DIASTOLIC BLOOD PRESSURE: 90 MMHG | SYSTOLIC BLOOD PRESSURE: 161 MMHG

## 2019-05-10 VITALS — DIASTOLIC BLOOD PRESSURE: 80 MMHG | SYSTOLIC BLOOD PRESSURE: 116 MMHG

## 2019-05-10 VITALS — SYSTOLIC BLOOD PRESSURE: 120 MMHG | DIASTOLIC BLOOD PRESSURE: 64 MMHG

## 2019-05-10 LAB
BASE EXCESS ABG: 7 MMOL/L (ref -3–3)
HCO3 BLDA-SCNC: 34 MMOL/L (ref 21–28)
INSPIRATION SETTING TIME VENT: 35
PCO2 BLDA: 61 MMHG (ref 35–46)
PO2 BLDA: 81 MMHG (ref 65–108)
SAO2 % BLDA: 95 % (ref 92–99)

## 2019-05-10 RX ADMIN — METHYLPREDNISOLONE SODIUM SUCCINATE SCH MG: 40 INJECTION, POWDER, FOR SOLUTION INTRAMUSCULAR; INTRAVENOUS at 22:57

## 2019-05-10 RX ADMIN — SIMVASTATIN SCH MG: 40 TABLET, FILM COATED ORAL at 20:42

## 2019-05-10 RX ADMIN — METHYLPREDNISOLONE SODIUM SUCCINATE SCH MG: 40 INJECTION, POWDER, FOR SOLUTION INTRAMUSCULAR; INTRAVENOUS at 15:11

## 2019-05-10 RX ADMIN — METHYLPREDNISOLONE SODIUM SUCCINATE SCH MG: 40 INJECTION, POWDER, FOR SOLUTION INTRAMUSCULAR; INTRAVENOUS at 06:53

## 2019-05-10 RX ADMIN — AZITHROMYCIN MONOHYDRATE SCH MLS/HR: 500 INJECTION, POWDER, LYOPHILIZED, FOR SOLUTION INTRAVENOUS at 14:48

## 2019-05-10 RX ADMIN — FAMOTIDINE SCH MG: 10 INJECTION, SOLUTION INTRAVENOUS at 20:44

## 2019-05-10 RX ADMIN — IPRATROPIUM BROMIDE AND ALBUTEROL SULFATE SCH ML: .5; 3 SOLUTION RESPIRATORY (INHALATION) at 13:17

## 2019-05-10 RX ADMIN — CLOPIDOGREL BISULFATE SCH MG: 75 TABLET ORAL at 14:46

## 2019-05-10 RX ADMIN — ENOXAPARIN SODIUM SCH MG: 40 INJECTION SUBCUTANEOUS at 14:46

## 2019-05-10 RX ADMIN — IPRATROPIUM BROMIDE AND ALBUTEROL SULFATE SCH ML: .5; 3 SOLUTION RESPIRATORY (INHALATION) at 08:28

## 2019-05-10 RX ADMIN — CEFTRIAXONE SCH GM: 1 INJECTION, POWDER, FOR SOLUTION INTRAMUSCULAR; INTRAVENOUS at 14:49

## 2019-05-10 RX ADMIN — IPRATROPIUM BROMIDE AND ALBUTEROL SULFATE SCH ML: .5; 3 SOLUTION RESPIRATORY (INHALATION) at 20:16

## 2019-05-10 RX ADMIN — FAMOTIDINE SCH MG: 10 INJECTION, SOLUTION INTRAVENOUS at 14:46

## 2019-05-10 RX ADMIN — ASPIRIN 81 MG SCH MG: 81 TABLET ORAL at 14:45

## 2019-05-10 RX ADMIN — LOSARTAN POTASSIUM SCH MG: 50 TABLET ORAL at 09:00

## 2019-05-10 RX ADMIN — IPRATROPIUM BROMIDE AND ALBUTEROL SULFATE SCH ML: .5; 3 SOLUTION RESPIRATORY (INHALATION) at 16:19

## 2019-05-10 NOTE — PDOC
PULMONARY PROGRESS NOTES


Subjective


PT INTUBATED


SEDATED ON AC MODE


Vitals





Vital Signs








  Date Time  Temp Pulse Resp B/P (MAP) Pulse Ox O2 Delivery O2 Flow Rate FiO2


 


5/10/19 09:00  48 20 119/67 (84) 99 Ventilator  


 


5/10/19 07:00 98.2       





 98.2       








Lungs:  Crackles


Cardiovascular:  S1, S2


Abdomen:  Soft


Extremities:  No Edema


Skin:  Warm


Labs





Laboratory Tests








Test


 5/8/19


17:36 5/9/19


00:00 5/9/19


04:54 5/9/19


05:57


 


Glucose (Fingerstick)


 159 mg/dL


(70-99) 145 mg/dL


(70-99) 


 170 mg/dL


(70-99)


 


White Blood Count


 


 


 10.2 x10^3/uL


(4.0-11.0) 





 


Red Blood Count


 


 


 3.99 x10^6/uL


(4.30-5.70) 





 


Hemoglobin


 


 


 11.2 g/dL


(13.0-17.5) 





 


Hematocrit


 


 


 35.0 %


(39.0-53.0) 





 


Mean Corpuscular Volume   88 fL ()  


 


Mean Corpuscular Hemoglobin   28 pg (25-35)  


 


Mean Corpuscular Hemoglobin


Concent 


 


 32 g/dL


(31-37) 





 


Red Cell Distribution Width


 


 


 19.0 %


(11.5-14.5) 





 


Platelet Count


 


 


 138 x10^3/uL


(140-400) 





 


Neutrophils (%) (Auto)   94 % (31-73)  


 


Lymphocytes (%) (Auto)   2 % (24-48)  


 


Monocytes (%) (Auto)   4 % (0-9)  


 


Eosinophils (%) (Auto)   0 % (0-3)  


 


Basophils (%) (Auto)   0 % (0-3)  


 


Neutrophils # (Auto)


 


 


 9.5 x10^3uL


(1.8-7.7) 





 


Lymphocytes # (Auto)


 


 


 0.2 x10^3/uL


(1.0-4.8) 





 


Monocytes # (Auto)


 


 


 0.4 x10^3/uL


(0.0-1.1) 





 


Eosinophils # (Auto)


 


 


 0.0 x10^3/uL


(0.0-0.7) 





 


Basophils # (Auto)


 


 


 0.0 x10^3/uL


(0.0-0.2) 





 


Sodium Level


 


 


 147 mmol/L


(136-145) 





 


Potassium Level


 


 


 4.4 mmol/L


(3.5-5.1) 





 


Chloride Level


 


 


 111 mmol/L


() 





 


Carbon Dioxide Level


 


 


 32 mmol/L


(21-32) 





 


Anion Gap   4 (6-14)  


 


Blood Urea Nitrogen


 


 


 43 mg/dL


(8-26) 





 


Creatinine


 


 


 0.9 mg/dL


(0.7-1.3) 





 


Estimated GFR


(Cockcroft-Gault) 


 


 83.4 


 





 


Glucose Level


 


 


 182 mg/dL


(70-99) 





 


Calcium Level


 


 


 8.4 mg/dL


(8.5-10.1) 





 


Test


 5/9/19


08:00 5/10/19


00:33 5/10/19


08:10 





 


O2 Saturation 96 % (92-99)   95 % (92-99)  


 


Arterial Blood pH


 7.34


(7.35-7.45) 


 7.37


(7.35-7.45) 





 


Arterial Blood pCO2 at


Patient Temp 62 mmHg


(35-46) 


 61 mmHg


(35-46) 





 


Arterial Blood pO2 at Patient


Temp 89 mmHg


() 


 81 mmHg


() 





 


Arterial Blood HCO3


 33 mmol/L


(21-28) 


 34 mmol/L


(21-28) 





 


Arterial Blood Base Excess


 5 mmol/L


(-3-3) 


 7 mmol/L


(-3-3) 





 


FiO2 40   35  


 


Glucose (Fingerstick)


 


 158 mg/dL


(70-99) 


 











Laboratory Tests








Test


 5/10/19


00:33 5/10/19


08:10


 


Glucose (Fingerstick)


 158 mg/dL


(70-99) 





 


O2 Saturation  95 % (92-99) 


 


Arterial Blood pH


 


 7.37


(7.35-7.45)


 


Arterial Blood pCO2 at


Patient Temp 


 61 mmHg


(35-46)


 


Arterial Blood pO2 at Patient


Temp 


 81 mmHg


()


 


Arterial Blood HCO3


 


 34 mmol/L


(21-28)


 


Arterial Blood Base Excess


 


 7 mmol/L


(-3-3)


 


FiO2  35 








Medications





Active Scripts








 Medications  Dose


 Route/Sig


 Max Daily Dose Days Date Category


 


 Prednisone 20 Mg


 Tablet  30 Mg


 PO DAILY


  5 3/6/19 Reported


 


 Levaquin


  (Levofloxacin)


 500 Mg Tablet  1 Tab


 PO DAILY


  10 3/6/19 Reported


 


 Ibuprofen 400 Mg


 Tablet  400 Mg


 PO PRN Q6HRS PRN


   2/9/18 Reported


 


 Duoneb 0.5-3(2.5)


 Mg/3 Ml


  (Albuterol/Ipratropium)


 3 Ml Ampul.neb  3 Ml


 NEB QID


   2/9/18 Reported


 


 Aspir-Low


  (Aspirin) 81 Mg


 Tablet.dr  1 Tab


 PO DAILY


   4/21/17 Reported


 


 Ventolin Hfa


 Inhaler


  (Albuterol


 Sulfate) 18 Gm


 Hfa.aer.ad  


 


   4/21/17 Reported


 


 Losartan-Hctz


 50-12.5 Mg Tab


  (Losartan/Hydrochlorothiazide)


 1 Each Tablet  1 Tab


 PO DAILY


   4/21/17 Reported


 


 Simvastatin 40 Mg


 Tablet  40 Mg


 PO HS


   6/4/15 Reported


 


 Clopidogrel


  (Clopidogrel


 Bisulfate) 75 Mg


 Tablet  75 Mg


 PO DAILY


   6/4/15 Reported











Impression


.








IMPRESSION:


1.  Acute-on-chronic hypoxic and hypercapnic respiratory failure


2.  Abnormal chest x-ray with bilateral interstitial infiltrates


3.  Possible a/c diastolic heart failure


4.  Abnormal D-dimer clinical suspicion for PE/DVT low


5.  Azotemia.


6.  Septic shock pt with tachycardia lactic acidosis and abnormal cxr


7.  Abnormal ABG with persistent acidosis


8.  Protein malnutrition POA moderate to severe


9.  S/P BRONCH ON 5/6











VENOUS DOPPLER 5/6


IMPRESSION:   There is no sonographic evidence of deep vein thrombosis in 


either lower extremity. 





IMPRESSION: 5/6


1. No CT evidence of central pulmonary emboli.


2. Emphysema.


3. Worsening tree-in-bud and interstitial opacities predominantly in the 


lower chest with mild confluent infiltrate posteriorly in the right lower 


lobe. An inflammatory/infectious etiology is most likely.





Plan


.


DID NOT DO WELL ON TRIAL


WILL CONTINUE SUPPORT


DIAGNOSTIC BRONCH SO FAR CULTURES NEGATIVE





ABG NOTE OK FOR PT HYPERCAPNIC 


ANTIBX


ENTERAL NUTRITION


DVT AND GI PROPH





BD








CCT 30 MIN











ERNESTINA MIGUEL MD              May 10, 2019 09:35

## 2019-05-10 NOTE — PDOC
Provider Note


Provider Note


still on vent, trying to wean- added miralax and mom- rest of care win -











GUS CHERY MD             May 10, 2019 08:58

## 2019-05-11 VITALS — DIASTOLIC BLOOD PRESSURE: 73 MMHG | SYSTOLIC BLOOD PRESSURE: 110 MMHG

## 2019-05-11 VITALS — DIASTOLIC BLOOD PRESSURE: 72 MMHG | SYSTOLIC BLOOD PRESSURE: 95 MMHG

## 2019-05-11 VITALS — DIASTOLIC BLOOD PRESSURE: 64 MMHG | SYSTOLIC BLOOD PRESSURE: 94 MMHG

## 2019-05-11 VITALS — SYSTOLIC BLOOD PRESSURE: 98 MMHG | DIASTOLIC BLOOD PRESSURE: 72 MMHG

## 2019-05-11 VITALS — SYSTOLIC BLOOD PRESSURE: 117 MMHG | DIASTOLIC BLOOD PRESSURE: 76 MMHG

## 2019-05-11 VITALS — SYSTOLIC BLOOD PRESSURE: 82 MMHG | DIASTOLIC BLOOD PRESSURE: 54 MMHG

## 2019-05-11 VITALS — DIASTOLIC BLOOD PRESSURE: 67 MMHG | SYSTOLIC BLOOD PRESSURE: 84 MMHG

## 2019-05-11 VITALS — DIASTOLIC BLOOD PRESSURE: 81 MMHG | SYSTOLIC BLOOD PRESSURE: 119 MMHG

## 2019-05-11 VITALS — SYSTOLIC BLOOD PRESSURE: 107 MMHG | DIASTOLIC BLOOD PRESSURE: 75 MMHG

## 2019-05-11 VITALS — DIASTOLIC BLOOD PRESSURE: 77 MMHG | SYSTOLIC BLOOD PRESSURE: 113 MMHG

## 2019-05-11 VITALS — SYSTOLIC BLOOD PRESSURE: 85 MMHG | DIASTOLIC BLOOD PRESSURE: 55 MMHG

## 2019-05-11 VITALS — SYSTOLIC BLOOD PRESSURE: 111 MMHG | DIASTOLIC BLOOD PRESSURE: 76 MMHG

## 2019-05-11 VITALS — SYSTOLIC BLOOD PRESSURE: 99 MMHG | DIASTOLIC BLOOD PRESSURE: 75 MMHG

## 2019-05-11 VITALS — DIASTOLIC BLOOD PRESSURE: 69 MMHG | SYSTOLIC BLOOD PRESSURE: 99 MMHG

## 2019-05-11 VITALS — DIASTOLIC BLOOD PRESSURE: 72 MMHG | SYSTOLIC BLOOD PRESSURE: 100 MMHG

## 2019-05-11 VITALS — DIASTOLIC BLOOD PRESSURE: 74 MMHG | SYSTOLIC BLOOD PRESSURE: 94 MMHG

## 2019-05-11 VITALS — SYSTOLIC BLOOD PRESSURE: 100 MMHG | DIASTOLIC BLOOD PRESSURE: 73 MMHG

## 2019-05-11 VITALS — DIASTOLIC BLOOD PRESSURE: 74 MMHG | SYSTOLIC BLOOD PRESSURE: 115 MMHG

## 2019-05-11 VITALS — DIASTOLIC BLOOD PRESSURE: 61 MMHG | SYSTOLIC BLOOD PRESSURE: 99 MMHG

## 2019-05-11 VITALS — DIASTOLIC BLOOD PRESSURE: 74 MMHG | SYSTOLIC BLOOD PRESSURE: 100 MMHG

## 2019-05-11 VITALS — SYSTOLIC BLOOD PRESSURE: 91 MMHG | DIASTOLIC BLOOD PRESSURE: 57 MMHG

## 2019-05-11 VITALS — SYSTOLIC BLOOD PRESSURE: 109 MMHG | DIASTOLIC BLOOD PRESSURE: 75 MMHG

## 2019-05-11 VITALS — DIASTOLIC BLOOD PRESSURE: 53 MMHG | SYSTOLIC BLOOD PRESSURE: 86 MMHG

## 2019-05-11 VITALS — SYSTOLIC BLOOD PRESSURE: 93 MMHG | DIASTOLIC BLOOD PRESSURE: 66 MMHG

## 2019-05-11 LAB
BASE EXCESS ABG: 7 MMOL/L (ref -3–3)
HCO3 BLDA-SCNC: 33 MMOL/L (ref 21–28)
INSPIRATION SETTING TIME VENT: 35
PCO2 BLDA: 55 MMHG (ref 35–46)
PO2 BLDA: 76 MMHG (ref 65–108)
SAO2 % BLDA: 94 % (ref 92–99)

## 2019-05-11 RX ADMIN — FAMOTIDINE SCH MG: 10 INJECTION, SOLUTION INTRAVENOUS at 20:46

## 2019-05-11 RX ADMIN — LOSARTAN POTASSIUM SCH MG: 50 TABLET ORAL at 08:37

## 2019-05-11 RX ADMIN — IPRATROPIUM BROMIDE AND ALBUTEROL SULFATE SCH ML: .5; 3 SOLUTION RESPIRATORY (INHALATION) at 20:07

## 2019-05-11 RX ADMIN — FAMOTIDINE SCH MG: 10 INJECTION, SOLUTION INTRAVENOUS at 08:37

## 2019-05-11 RX ADMIN — METHYLPREDNISOLONE SODIUM SUCCINATE SCH MG: 40 INJECTION, POWDER, FOR SOLUTION INTRAMUSCULAR; INTRAVENOUS at 21:34

## 2019-05-11 RX ADMIN — IPRATROPIUM BROMIDE AND ALBUTEROL SULFATE SCH ML: .5; 3 SOLUTION RESPIRATORY (INHALATION) at 11:53

## 2019-05-11 RX ADMIN — ASPIRIN 81 MG SCH MG: 81 TABLET ORAL at 08:37

## 2019-05-11 RX ADMIN — IPRATROPIUM BROMIDE AND ALBUTEROL SULFATE SCH ML: .5; 3 SOLUTION RESPIRATORY (INHALATION) at 15:49

## 2019-05-11 RX ADMIN — AZITHROMYCIN MONOHYDRATE SCH MLS/HR: 500 INJECTION, POWDER, LYOPHILIZED, FOR SOLUTION INTRAVENOUS at 08:36

## 2019-05-11 RX ADMIN — CEFTRIAXONE SCH GM: 1 INJECTION, POWDER, FOR SOLUTION INTRAMUSCULAR; INTRAVENOUS at 08:36

## 2019-05-11 RX ADMIN — CLOPIDOGREL BISULFATE SCH MG: 75 TABLET ORAL at 08:37

## 2019-05-11 RX ADMIN — IPRATROPIUM BROMIDE AND ALBUTEROL SULFATE SCH ML: .5; 3 SOLUTION RESPIRATORY (INHALATION) at 08:31

## 2019-05-11 RX ADMIN — ENOXAPARIN SODIUM SCH MG: 40 INJECTION SUBCUTANEOUS at 08:38

## 2019-05-11 RX ADMIN — SIMVASTATIN SCH MG: 40 TABLET, FILM COATED ORAL at 20:46

## 2019-05-11 RX ADMIN — METHYLPREDNISOLONE SODIUM SUCCINATE SCH MG: 40 INJECTION, POWDER, FOR SOLUTION INTRAMUSCULAR; INTRAVENOUS at 14:04

## 2019-05-11 RX ADMIN — MIDAZOLAM PRN MLS/HR: 5 INJECTION, SOLUTION INTRAMUSCULAR; INTRAVENOUS at 18:23

## 2019-05-11 RX ADMIN — METHYLPREDNISOLONE SODIUM SUCCINATE SCH MG: 40 INJECTION, POWDER, FOR SOLUTION INTRAMUSCULAR; INTRAVENOUS at 07:46

## 2019-05-11 NOTE — PDOC
PULMONARY PROGRESS NOTES


Subjective


PT INTUBATED


SEDATED ON AC MODE


DID NOT DO WELL ON TRIAL YESTERDAY


Vitals





Vital Signs








  Date Time  Temp Pulse Resp B/P (MAP) Pulse Ox O2 Delivery O2 Flow Rate FiO2


 


5/11/19 11:53     96 Ventilator  


 


5/11/19 11:00  117 20 110/73 (85)    


 


5/11/19 08:00 98.4       





 98.4       


 


5/10/19 22:40       4.0 








Lungs:  Crackles


Cardiovascular:  S1, S2


Abdomen:  Soft


Extremities:  No Edema


Skin:  Warm


Labs





Laboratory Tests








Test


 5/10/19


00:33 5/10/19


08:10 5/11/19


08:00


 


Glucose (Fingerstick)


 158 mg/dL


(70-99) 


 





 


O2 Saturation  95 % (92-99)  94 % (92-99) 


 


Arterial Blood pH


 


 7.37


(7.35-7.45) 7.40


(7.35-7.45)


 


Arterial Blood pCO2 at


Patient Temp 


 61 mmHg


(35-46) 55 mmHg


(35-46)


 


Arterial Blood pO2 at Patient


Temp 


 81 mmHg


() 76 mmHg


()


 


Arterial Blood HCO3


 


 34 mmol/L


(21-28) 33 mmol/L


(21-28)


 


Arterial Blood Base Excess


 


 7 mmol/L


(-3-3) 7 mmol/L


(-3-3)


 


FiO2  35  35 








Laboratory Tests








Test


 5/11/19


08:00


 


O2 Saturation 94 % (92-99) 


 


Arterial Blood pH


 7.40


(7.35-7.45)


 


Arterial Blood pCO2 at


Patient Temp 55 mmHg


(35-46)


 


Arterial Blood pO2 at Patient


Temp 76 mmHg


()


 


Arterial Blood HCO3


 33 mmol/L


(21-28)


 


Arterial Blood Base Excess


 7 mmol/L


(-3-3)


 


FiO2 35 








Medications





Active Scripts








 Medications  Dose


 Route/Sig


 Max Daily Dose Days Date Category


 


 Prednisone 20 Mg


 Tablet  30 Mg


 PO DAILY


  5 3/6/19 Reported


 


 Levaquin


  (Levofloxacin)


 500 Mg Tablet  1 Tab


 PO DAILY


  10 3/6/19 Reported


 


 Ibuprofen 400 Mg


 Tablet  400 Mg


 PO PRN Q6HRS PRN


   2/9/18 Reported


 


 Duoneb 0.5-3(2.5)


 Mg/3 Ml


  (Albuterol/Ipratropium)


 3 Ml Ampul.neb  3 Ml


 NEB QID


   2/9/18 Reported


 


 Aspir-Low


  (Aspirin) 81 Mg


 Tablet.dr Magdaleno Tab


 PO DAILY


   4/21/17 Reported


 


 Ventolin Hfa


 Inhaler


  (Albuterol


 Sulfate) 18 Gm


 Hfa.aer.ad  


 


   4/21/17 Reported


 


 Losartan-Hctz


 50-12.5 Mg Tab


  (Losartan/Hydrochlorothiazide)


 1 Each Tablet  1 Tab


 PO DAILY


   4/21/17 Reported


 


 Simvastatin 40 Mg


 Tablet  40 Mg


 PO HS


   6/4/15 Reported


 


 Clopidogrel


  (Clopidogrel


 Bisulfate) 75 Mg


 Tablet  75 Mg


 PO DAILY


   6/4/15 Reported











Impression


.








IMPRESSION:


1.  Acute-on-chronic hypoxic and hypercapnic respiratory failure


2.  Abnormal chest x-ray with bilateral interstitial infiltrates


3.  Possible a/c diastolic heart failure


4.  Abnormal D-dimer clinical suspicion for PE/DVT low


5.  Azotemia.


6.  Septic shock pt with tachycardia lactic acidosis and abnormal cxr


7.  Abnormal ABG with persistent acidosis


8.  Protein malnutrition POA moderate to severe


9.  S/P BRONCH ON 5/6











VENOUS DOPPLER 5/6


IMPRESSION:   There is no sonographic evidence of deep vein thrombosis in 


either lower extremity. 





IMPRESSION: 5/6


1. No CT evidence of central pulmonary emboli.


2. Emphysema.


3. Worsening tree-in-bud and interstitial opacities predominantly in the 


lower chest with mild confluent infiltrate posteriorly in the right lower 


lobe. An inflammatory/infectious etiology is most likely.





Plan


.


DID NOT DO WELL ON TRIAL YESTERDAY


I THINK WE CAN NOT ADEQUATELY PLACE PT ON TRIAL HE GETS AGITATED


WE MAY NEED TO EXTUBATE ON MONDAY OR TUESDAY WITH OUT TRIAL 





WILL CONTINUE SUPPORT


DIAGNOSTIC BRONCH SO FAR CULTURES NEGATIVE





ABG NOTE OK FOR PT HYPERCAPNIC 


ANTIBX


ENTERAL NUTRITION


DVT AND GI PROPH





BD





COMPLEX DECISION MAKING D/W WITH TEAM


CCT 30 MIN











ERNESTINA MIGUEL MD              May 11, 2019 12:09

## 2019-05-11 NOTE — PDOC
Provider Note


Provider Note


remains sedated , full vent mode re failed t tube trial- labs ok, no new sxs, 

cxr clear - cont all meds same











GUS CHERY MD             May 11, 2019 11:10

## 2019-05-12 VITALS — DIASTOLIC BLOOD PRESSURE: 60 MMHG | SYSTOLIC BLOOD PRESSURE: 133 MMHG

## 2019-05-12 VITALS — DIASTOLIC BLOOD PRESSURE: 59 MMHG | SYSTOLIC BLOOD PRESSURE: 134 MMHG

## 2019-05-12 VITALS — SYSTOLIC BLOOD PRESSURE: 121 MMHG | DIASTOLIC BLOOD PRESSURE: 59 MMHG

## 2019-05-12 VITALS — DIASTOLIC BLOOD PRESSURE: 65 MMHG | SYSTOLIC BLOOD PRESSURE: 152 MMHG

## 2019-05-12 VITALS — DIASTOLIC BLOOD PRESSURE: 73 MMHG | SYSTOLIC BLOOD PRESSURE: 158 MMHG

## 2019-05-12 VITALS — DIASTOLIC BLOOD PRESSURE: 60 MMHG | SYSTOLIC BLOOD PRESSURE: 109 MMHG

## 2019-05-12 VITALS — DIASTOLIC BLOOD PRESSURE: 53 MMHG | SYSTOLIC BLOOD PRESSURE: 89 MMHG

## 2019-05-12 VITALS — DIASTOLIC BLOOD PRESSURE: 69 MMHG | SYSTOLIC BLOOD PRESSURE: 154 MMHG

## 2019-05-12 VITALS — SYSTOLIC BLOOD PRESSURE: 100 MMHG | DIASTOLIC BLOOD PRESSURE: 54 MMHG

## 2019-05-12 VITALS — DIASTOLIC BLOOD PRESSURE: 58 MMHG | SYSTOLIC BLOOD PRESSURE: 114 MMHG

## 2019-05-12 VITALS — DIASTOLIC BLOOD PRESSURE: 54 MMHG | SYSTOLIC BLOOD PRESSURE: 114 MMHG

## 2019-05-12 VITALS — DIASTOLIC BLOOD PRESSURE: 55 MMHG | SYSTOLIC BLOOD PRESSURE: 98 MMHG

## 2019-05-12 VITALS — DIASTOLIC BLOOD PRESSURE: 65 MMHG | SYSTOLIC BLOOD PRESSURE: 153 MMHG

## 2019-05-12 VITALS — SYSTOLIC BLOOD PRESSURE: 101 MMHG | DIASTOLIC BLOOD PRESSURE: 56 MMHG

## 2019-05-12 VITALS — DIASTOLIC BLOOD PRESSURE: 55 MMHG | SYSTOLIC BLOOD PRESSURE: 129 MMHG

## 2019-05-12 VITALS — SYSTOLIC BLOOD PRESSURE: 106 MMHG | DIASTOLIC BLOOD PRESSURE: 54 MMHG

## 2019-05-12 VITALS — DIASTOLIC BLOOD PRESSURE: 50 MMHG | SYSTOLIC BLOOD PRESSURE: 104 MMHG

## 2019-05-12 VITALS — DIASTOLIC BLOOD PRESSURE: 52 MMHG | SYSTOLIC BLOOD PRESSURE: 91 MMHG

## 2019-05-12 VITALS — DIASTOLIC BLOOD PRESSURE: 59 MMHG | SYSTOLIC BLOOD PRESSURE: 106 MMHG

## 2019-05-12 VITALS — SYSTOLIC BLOOD PRESSURE: 119 MMHG | DIASTOLIC BLOOD PRESSURE: 58 MMHG

## 2019-05-12 VITALS — SYSTOLIC BLOOD PRESSURE: 129 MMHG | DIASTOLIC BLOOD PRESSURE: 60 MMHG

## 2019-05-12 VITALS — DIASTOLIC BLOOD PRESSURE: 54 MMHG | SYSTOLIC BLOOD PRESSURE: 87 MMHG

## 2019-05-12 VITALS — SYSTOLIC BLOOD PRESSURE: 103 MMHG | DIASTOLIC BLOOD PRESSURE: 59 MMHG

## 2019-05-12 VITALS — DIASTOLIC BLOOD PRESSURE: 64 MMHG | SYSTOLIC BLOOD PRESSURE: 143 MMHG

## 2019-05-12 LAB
BASE EXCESS ABG: 6 MMOL/L (ref -3–3)
HCO3 BLDA-SCNC: 32 MMOL/L (ref 21–28)
INSPIRATION SETTING TIME VENT: 40
PCO2 BLDA: 56 MMHG (ref 35–46)
PO2 BLDA: 83 MMHG (ref 65–108)
SAO2 % BLDA: 95 % (ref 92–99)

## 2019-05-12 RX ADMIN — FAMOTIDINE SCH MG: 10 INJECTION, SOLUTION INTRAVENOUS at 21:24

## 2019-05-12 RX ADMIN — IPRATROPIUM BROMIDE AND ALBUTEROL SULFATE SCH ML: .5; 3 SOLUTION RESPIRATORY (INHALATION) at 11:48

## 2019-05-12 RX ADMIN — METHYLPREDNISOLONE SODIUM SUCCINATE SCH MG: 40 INJECTION, POWDER, FOR SOLUTION INTRAMUSCULAR; INTRAVENOUS at 14:04

## 2019-05-12 RX ADMIN — CLOPIDOGREL BISULFATE SCH MG: 75 TABLET ORAL at 08:29

## 2019-05-12 RX ADMIN — ENOXAPARIN SODIUM SCH MG: 40 INJECTION SUBCUTANEOUS at 08:30

## 2019-05-12 RX ADMIN — METHYLPREDNISOLONE SODIUM SUCCINATE SCH MG: 40 INJECTION, POWDER, FOR SOLUTION INTRAMUSCULAR; INTRAVENOUS at 21:33

## 2019-05-12 RX ADMIN — MIDAZOLAM PRN MLS/HR: 5 INJECTION, SOLUTION INTRAMUSCULAR; INTRAVENOUS at 21:56

## 2019-05-12 RX ADMIN — ASPIRIN 81 MG SCH MG: 81 TABLET ORAL at 08:29

## 2019-05-12 RX ADMIN — AZITHROMYCIN MONOHYDRATE SCH MLS/HR: 500 INJECTION, POWDER, LYOPHILIZED, FOR SOLUTION INTRAVENOUS at 08:29

## 2019-05-12 RX ADMIN — IPRATROPIUM BROMIDE AND ALBUTEROL SULFATE SCH ML: .5; 3 SOLUTION RESPIRATORY (INHALATION) at 15:31

## 2019-05-12 RX ADMIN — FAMOTIDINE SCH MG: 10 INJECTION, SOLUTION INTRAVENOUS at 08:29

## 2019-05-12 RX ADMIN — IPRATROPIUM BROMIDE AND ALBUTEROL SULFATE SCH ML: .5; 3 SOLUTION RESPIRATORY (INHALATION) at 08:52

## 2019-05-12 RX ADMIN — CEFTRIAXONE SCH GM: 1 INJECTION, POWDER, FOR SOLUTION INTRAMUSCULAR; INTRAVENOUS at 08:29

## 2019-05-12 RX ADMIN — SIMVASTATIN SCH MG: 40 TABLET, FILM COATED ORAL at 21:24

## 2019-05-12 RX ADMIN — METHYLPREDNISOLONE SODIUM SUCCINATE SCH MG: 40 INJECTION, POWDER, FOR SOLUTION INTRAMUSCULAR; INTRAVENOUS at 05:51

## 2019-05-12 RX ADMIN — IPRATROPIUM BROMIDE AND ALBUTEROL SULFATE SCH ML: .5; 3 SOLUTION RESPIRATORY (INHALATION) at 20:10

## 2019-05-12 NOTE — PDOC
Provider Note


Provider Note


bp still lowish, vss, no temp- full vent support- will hold losartan re bp, rest

same - still high dose steroids, labs ok











GUS CHERY MD             May 12, 2019 08:23

## 2019-05-12 NOTE — PDOC
PULMONARY PROGRESS NOTES


Subjective


PT INTUBATED


SEDATED ON AC MODE


DID NOT DO WELL ON TRIAL


Vitals





Vital Signs








  Date Time  Temp Pulse Resp B/P (MAP) Pulse Ox O2 Delivery O2 Flow Rate FiO2


 


5/12/19 15:21   11  93 Ventilator  


 


5/12/19 15:00  51  114/58 (76)    


 


5/12/19 12:00 98.4       





 98.4       








Lungs:  Crackles


Cardiovascular:  S1, S2


Abdomen:  Soft


Extremities:  No Edema


Skin:  Warm


Labs





Laboratory Tests








Test


 5/11/19


08:00 5/12/19


09:15


 


O2 Saturation 94 % (92-99)  95 % (92-99) 


 


Arterial Blood pH


 7.40


(7.35-7.45) 7.38


(7.35-7.45)


 


Arterial Blood pCO2 at


Patient Temp 55 mmHg


(35-46) 56 mmHg


(35-46)


 


Arterial Blood pO2 at Patient


Temp 76 mmHg


() 83 mmHg


()


 


Arterial Blood HCO3


 33 mmol/L


(21-28) 32 mmol/L


(21-28)


 


Arterial Blood Base Excess


 7 mmol/L


(-3-3) 6 mmol/L


(-3-3)


 


FiO2 35  40 








Laboratory Tests








Test


 5/12/19


09:15


 


O2 Saturation 95 % (92-99) 


 


Arterial Blood pH


 7.38


(7.35-7.45)


 


Arterial Blood pCO2 at


Patient Temp 56 mmHg


(35-46)


 


Arterial Blood pO2 at Patient


Temp 83 mmHg


()


 


Arterial Blood HCO3


 32 mmol/L


(21-28)


 


Arterial Blood Base Excess


 6 mmol/L


(-3-3)


 


FiO2 40 








Medications





Active Scripts








 Medications  Dose


 Route/Sig


 Max Daily Dose Days Date Category


 


 Prednisone 20 Mg


 Tablet  30 Mg


 PO DAILY


  5 3/6/19 Reported


 


 Levaquin


  (Levofloxacin)


 500 Mg Tablet  1 Tab


 PO DAILY


  10 3/6/19 Reported


 


 Ibuprofen 400 Mg


 Tablet  400 Mg


 PO PRN Q6HRS PRN


   2/9/18 Reported


 


 Duoneb 0.5-3(2.5)


 Mg/3 Ml


  (Albuterol/Ipratropium)


 3 Ml Ampul.neb  3 Ml


 NEB QID


   2/9/18 Reported


 


 Aspir-Low


  (Aspirin) 81 Mg


 Tablet.dr  1 Tab


 PO DAILY


   4/21/17 Reported


 


 Ventolin Hfa


 Inhaler


  (Albuterol


 Sulfate) 18 Gm


 Hfa.aer.ad  


 


   4/21/17 Reported


 


 Losartan-Hctz


 50-12.5 Mg Tab


  (Losartan/Hydrochlorothiazide)


 1 Each Tablet  1 Tab


 PO DAILY


   4/21/17 Reported


 


 Simvastatin 40 Mg


 Tablet  40 Mg


 PO HS


   6/4/15 Reported


 


 Clopidogrel


  (Clopidogrel


 Bisulfate) 75 Mg


 Tablet  75 Mg


 PO DAILY


   6/4/15 Reported











Impression


.








IMPRESSION:


1.  Acute-on-chronic hypoxic and hypercapnic respiratory failure


2.  Abnormal chest x-ray with bilateral interstitial infiltrates


3.  Possible a/c diastolic heart failure


4.  Abnormal D-dimer clinical suspicion for PE/DVT low


5.  Azotemia.


6.  Septic shock pt with tachycardia lactic acidosis and abnormal cxr


7.  Abnormal ABG with persistent acidosis


8.  Protein malnutrition POA moderate to severe


9.  S/P BRONCH ON 5/6











VENOUS DOPPLER 5/6


IMPRESSION:   There is no sonographic evidence of deep vein thrombosis in 


either lower extremity. 





IMPRESSION: 5/6


1. No CT evidence of central pulmonary emboli.


2. Emphysema.


3. Worsening tree-in-bud and interstitial opacities predominantly in the 


lower chest with mild confluent infiltrate posteriorly in the right lower 


lobe. An inflammatory/infectious etiology is most likely.





Plan


.


POSSIBLE EXTUBATION ON MONDAY


I THINK WE CAN NOT ADEQUATELY PLACE PT ON TRIAL HE GETS AGITATED


WE MAY NEED TO EXTUBATE ON MONDAY OR TUESDAY WITH OUT TRIAL 





WILL CONTINUE SUPPORT


DIAGNOSTIC BRONCH SO FAR CULTURES NEGATIVE





ABG NOTE OK FOR PT HYPERCAPNIC 


ANTIBX


ENTERAL NUTRITION


DVT AND GI PROPH





BD





COMPLEX DECISION MAKING D/W WITH TEAM


CCT 30 MIN











ERNESTINA MIGUEL MD              May 12, 2019 15:26

## 2019-05-12 NOTE — RAD
AP portable chest  radiograph 5/12/2019

 

Clinical History: Respiratory failure.

 

An AP erect portable digital radiograph of the chest was obtained.

 

Comparison study is dated 5/9/2019.

 

The ET tube and NG tube are unchanged in position. The cardiac silhouette 

is normal in size. Atherosclerotic calcification of the thoracic aorta is 

seen. The thoracic aorta is mildly tortuous. No area of consolidation is 

seen. No pneumothorax or pleural effusion is noted. The osseous structures

are unchanged.

 

Impression: No area of consolidation is seen.

 

Electronically signed by: Esteban Wong MD (5/12/2019 10:34 AM) [facility]

## 2019-05-13 VITALS — SYSTOLIC BLOOD PRESSURE: 113 MMHG | DIASTOLIC BLOOD PRESSURE: 54 MMHG

## 2019-05-13 VITALS — DIASTOLIC BLOOD PRESSURE: 83 MMHG | SYSTOLIC BLOOD PRESSURE: 141 MMHG

## 2019-05-13 VITALS — SYSTOLIC BLOOD PRESSURE: 135 MMHG | DIASTOLIC BLOOD PRESSURE: 82 MMHG

## 2019-05-13 VITALS — DIASTOLIC BLOOD PRESSURE: 72 MMHG | SYSTOLIC BLOOD PRESSURE: 129 MMHG

## 2019-05-13 VITALS — SYSTOLIC BLOOD PRESSURE: 155 MMHG | DIASTOLIC BLOOD PRESSURE: 85 MMHG

## 2019-05-13 VITALS — SYSTOLIC BLOOD PRESSURE: 182 MMHG | DIASTOLIC BLOOD PRESSURE: 98 MMHG

## 2019-05-13 VITALS — SYSTOLIC BLOOD PRESSURE: 139 MMHG | DIASTOLIC BLOOD PRESSURE: 68 MMHG

## 2019-05-13 VITALS — SYSTOLIC BLOOD PRESSURE: 178 MMHG | DIASTOLIC BLOOD PRESSURE: 104 MMHG

## 2019-05-13 VITALS — SYSTOLIC BLOOD PRESSURE: 156 MMHG | DIASTOLIC BLOOD PRESSURE: 81 MMHG

## 2019-05-13 VITALS — SYSTOLIC BLOOD PRESSURE: 112 MMHG | DIASTOLIC BLOOD PRESSURE: 69 MMHG

## 2019-05-13 VITALS — DIASTOLIC BLOOD PRESSURE: 72 MMHG | SYSTOLIC BLOOD PRESSURE: 146 MMHG

## 2019-05-13 VITALS — SYSTOLIC BLOOD PRESSURE: 103 MMHG | DIASTOLIC BLOOD PRESSURE: 50 MMHG

## 2019-05-13 VITALS — DIASTOLIC BLOOD PRESSURE: 59 MMHG | SYSTOLIC BLOOD PRESSURE: 130 MMHG

## 2019-05-13 VITALS — DIASTOLIC BLOOD PRESSURE: 57 MMHG | SYSTOLIC BLOOD PRESSURE: 110 MMHG

## 2019-05-13 VITALS — DIASTOLIC BLOOD PRESSURE: 58 MMHG | SYSTOLIC BLOOD PRESSURE: 116 MMHG

## 2019-05-13 VITALS — DIASTOLIC BLOOD PRESSURE: 109 MMHG | SYSTOLIC BLOOD PRESSURE: 189 MMHG

## 2019-05-13 VITALS — SYSTOLIC BLOOD PRESSURE: 206 MMHG | DIASTOLIC BLOOD PRESSURE: 104 MMHG

## 2019-05-13 VITALS — SYSTOLIC BLOOD PRESSURE: 149 MMHG | DIASTOLIC BLOOD PRESSURE: 73 MMHG

## 2019-05-13 VITALS — SYSTOLIC BLOOD PRESSURE: 158 MMHG | DIASTOLIC BLOOD PRESSURE: 86 MMHG

## 2019-05-13 VITALS — SYSTOLIC BLOOD PRESSURE: 131 MMHG | DIASTOLIC BLOOD PRESSURE: 65 MMHG

## 2019-05-13 VITALS — DIASTOLIC BLOOD PRESSURE: 50 MMHG | SYSTOLIC BLOOD PRESSURE: 103 MMHG

## 2019-05-13 VITALS — SYSTOLIC BLOOD PRESSURE: 173 MMHG | DIASTOLIC BLOOD PRESSURE: 90 MMHG

## 2019-05-13 VITALS — DIASTOLIC BLOOD PRESSURE: 52 MMHG | SYSTOLIC BLOOD PRESSURE: 104 MMHG

## 2019-05-13 VITALS — DIASTOLIC BLOOD PRESSURE: 81 MMHG | SYSTOLIC BLOOD PRESSURE: 131 MMHG

## 2019-05-13 VITALS — DIASTOLIC BLOOD PRESSURE: 106 MMHG | SYSTOLIC BLOOD PRESSURE: 186 MMHG

## 2019-05-13 VITALS — SYSTOLIC BLOOD PRESSURE: 185 MMHG | DIASTOLIC BLOOD PRESSURE: 99 MMHG

## 2019-05-13 LAB
BASE EXCESS ABG: 7 MMOL/L (ref -3–3)
HCO3 BLDA-SCNC: 34 MMOL/L (ref 21–28)
INSPIRATION SETTING TIME VENT: 50
PCO2 BLDA: 61 MMHG (ref 35–46)
PO2 BLDA: 89 MMHG (ref 65–108)
SAO2 % BLDA: 96 % (ref 92–99)

## 2019-05-13 RX ADMIN — IPRATROPIUM BROMIDE AND ALBUTEROL SULFATE SCH ML: .5; 3 SOLUTION RESPIRATORY (INHALATION) at 12:01

## 2019-05-13 RX ADMIN — METHYLPREDNISOLONE SODIUM SUCCINATE SCH MG: 40 INJECTION, POWDER, FOR SOLUTION INTRAMUSCULAR; INTRAVENOUS at 06:34

## 2019-05-13 RX ADMIN — IPRATROPIUM BROMIDE AND ALBUTEROL SULFATE SCH ML: .5; 3 SOLUTION RESPIRATORY (INHALATION) at 08:48

## 2019-05-13 RX ADMIN — IPRATROPIUM BROMIDE AND ALBUTEROL SULFATE SCH ML: .5; 3 SOLUTION RESPIRATORY (INHALATION) at 19:49

## 2019-05-13 RX ADMIN — CEFTRIAXONE SCH GM: 1 INJECTION, POWDER, FOR SOLUTION INTRAMUSCULAR; INTRAVENOUS at 10:54

## 2019-05-13 RX ADMIN — DEXMEDETOMIDINE HYDROCHLORIDE PRN MLS/HR: 100 INJECTION, SOLUTION, CONCENTRATE INTRAVENOUS at 14:01

## 2019-05-13 RX ADMIN — METHYLPREDNISOLONE SODIUM SUCCINATE SCH MG: 40 INJECTION, POWDER, FOR SOLUTION INTRAMUSCULAR; INTRAVENOUS at 22:09

## 2019-05-13 RX ADMIN — PROPOFOL PRN MLS/HR: 10 INJECTION, EMULSION INTRAVENOUS at 21:11

## 2019-05-13 RX ADMIN — SIMVASTATIN SCH MG: 40 TABLET, FILM COATED ORAL at 21:11

## 2019-05-13 RX ADMIN — IPRATROPIUM BROMIDE AND ALBUTEROL SULFATE SCH ML: .5; 3 SOLUTION RESPIRATORY (INHALATION) at 15:49

## 2019-05-13 RX ADMIN — PROPOFOL PRN MLS/HR: 10 INJECTION, EMULSION INTRAVENOUS at 17:00

## 2019-05-13 RX ADMIN — FAMOTIDINE SCH MG: 10 INJECTION, SOLUTION INTRAVENOUS at 21:11

## 2019-05-13 RX ADMIN — FAMOTIDINE SCH MG: 10 INJECTION, SOLUTION INTRAVENOUS at 10:55

## 2019-05-13 RX ADMIN — ENOXAPARIN SODIUM SCH MG: 40 INJECTION SUBCUTANEOUS at 10:55

## 2019-05-13 RX ADMIN — AZITHROMYCIN MONOHYDRATE SCH MLS/HR: 500 INJECTION, POWDER, LYOPHILIZED, FOR SOLUTION INTRAVENOUS at 10:54

## 2019-05-13 RX ADMIN — METHYLPREDNISOLONE SODIUM SUCCINATE SCH MG: 40 INJECTION, POWDER, FOR SOLUTION INTRAMUSCULAR; INTRAVENOUS at 14:14

## 2019-05-13 RX ADMIN — DEXMEDETOMIDINE HYDROCHLORIDE PRN MLS/HR: 100 INJECTION, SOLUTION, CONCENTRATE INTRAVENOUS at 15:42

## 2019-05-13 RX ADMIN — ASPIRIN 81 MG SCH MG: 81 TABLET ORAL at 10:55

## 2019-05-13 RX ADMIN — CLOPIDOGREL BISULFATE SCH MG: 75 TABLET ORAL at 10:55

## 2019-05-13 NOTE — PDOC
PULMONARY PROGRESS NOTES


Subjective


PT INTUBATED


OFF SEDATION DOES NOT FOLLOW COMMANDS


ON P1 16


Vitals





Vital Signs








  Date Time  Temp Pulse Resp B/P (MAP) Pulse Ox O2 Delivery O2 Flow Rate FiO2


 


5/13/19 08:00      Mechanical Ventilator  


 


5/13/19 07:00  47 20 103/50 (67) 97   


 


5/13/19 04:00 98.2       





 98.2       








Lungs:  Crackles


Cardiovascular:  S1, S2


Abdomen:  Soft


Extremities:  No Edema


Skin:  Warm


Labs





Laboratory Tests








Test


 5/12/19


09:15 5/13/19


00:09 5/13/19


06:38


 


O2 Saturation 95 % (92-99)   


 


Arterial Blood pH


 7.38


(7.35-7.45) 


 





 


Arterial Blood pCO2 at


Patient Temp 56 mmHg


(35-46) 


 





 


Arterial Blood pO2 at Patient


Temp 83 mmHg


() 


 





 


Arterial Blood HCO3


 32 mmol/L


(21-28) 


 





 


Arterial Blood Base Excess


 6 mmol/L


(-3-3) 


 





 


FiO2 40   


 


Glucose (Fingerstick)


 


 154 mg/dL


(70-99) 179 mg/dL


(70-99)








Laboratory Tests








Test


 5/12/19


09:15 5/13/19


00:09 5/13/19


06:38


 


O2 Saturation 95 % (92-99)   


 


Arterial Blood pH


 7.38


(7.35-7.45) 


 





 


Arterial Blood pCO2 at


Patient Temp 56 mmHg


(35-46) 


 





 


Arterial Blood pO2 at Patient


Temp 83 mmHg


() 


 





 


Arterial Blood HCO3


 32 mmol/L


(21-28) 


 





 


Arterial Blood Base Excess


 6 mmol/L


(-3-3) 


 





 


FiO2 40   


 


Glucose (Fingerstick)


 


 154 mg/dL


(70-99) 179 mg/dL


(70-99)








Medications





Active Scripts








 Medications  Dose


 Route/Sig


 Max Daily Dose Days Date Category


 


 Prednisone 20 Mg


 Tablet  30 Mg


 PO DAILY


  5 3/6/19 Reported


 


 Levaquin


  (Levofloxacin)


 500 Mg Tablet  1 Tab


 PO DAILY


  10 3/6/19 Reported


 


 Ibuprofen 400 Mg


 Tablet  400 Mg


 PO PRN Q6HRS PRN


   2/9/18 Reported


 


 Duoneb 0.5-3(2.5)


 Mg/3 Ml


  (Albuterol/Ipratropium)


 3 Ml Ampul.neb  3 Ml


 NEB QID


   2/9/18 Reported


 


 Aspir-Low


  (Aspirin) 81 Mg


 Tablet.dr  1 Tab


 PO DAILY


   4/21/17 Reported


 


 Ventolin Hfa


 Inhaler


  (Albuterol


 Sulfate) 18 Gm


 Hfa.aer.ad  


 


   4/21/17 Reported


 


 Losartan-Hctz


 50-12.5 Mg Tab


  (Losartan/Hydrochlorothiazide)


 1 Each Tablet  1 Tab


 PO DAILY


   4/21/17 Reported


 


 Simvastatin 40 Mg


 Tablet  40 Mg


 PO HS


   6/4/15 Reported


 


 Clopidogrel


  (Clopidogrel


 Bisulfate) 75 Mg


 Tablet  75 Mg


 PO DAILY


   6/4/15 Reported











Impression


.








IMPRESSION:


1.  Acute-on-chronic hypoxic and hypercapnic respiratory failure


2.  Abnormal chest x-ray with bilateral interstitial infiltrates


3.  Possible a/c diastolic heart failure


4.  Abnormal D-dimer clinical suspicion for PE/DVT low


5.  Azotemia.


6.  Septic shock pt with tachycardia lactic acidosis and abnormal cxr


7.  Abnormal ABG with persistent acidosis


8.  Protein malnutrition POA moderate to severe


9.  S/P BRONCH ON 5/6











VENOUS DOPPLER 5/6


IMPRESSION:   There is no sonographic evidence of deep vein thrombosis in 


either lower extremity. 





IMPRESSION: 5/6


1. No CT evidence of central pulmonary emboli.


2. Emphysema.


3. Worsening tree-in-bud and interstitial opacities predominantly in the 


lower chest with mild confluent infiltrate posteriorly in the right lower 


lobe. An inflammatory/infectious etiology is most likely.





Plan


.


OFF SEDATION ON PS 16 DID NOT RESPOND TO VERBAL COMMANDS


D/W RN MAINTAIN OFF SEDATION


DECREASE PS 


WILL CONTINUE SUPPORT


DIAGNOSTIC BRONCH SO FAR CULTURES NEGATIVE





ABG NOTE OK FOR PT HYPERCAPNIC 


ANTIBX


ENTERAL NUTRITION


DVT AND GI PROPH





BD





COMPLEX DECISION MAKING D/W WITH TEAM


CCT 30 MIN











ERNESTINA MIGUEL MD              May 13, 2019 08:35

## 2019-05-13 NOTE — NUR
SS following up with discharge planning. Pt continues on the vent. Per notes may extubate 
today. Pt remains on IV antibiotics. SS will continue to follow for discharge planning.

## 2019-05-13 NOTE — NUR
At 0015, patient's oxygen saturation decreased to 90%; patient suctioned, sedation boluses 
given, and patient also given a 100% O2 bolus but saturation again decreased to 88%. With 
increasing O2, patient saturation now maintaining 94%. Will continue to monitor and titrate 
O2 to keep saturation >/= 90%.

## 2019-05-13 NOTE — PDOC
Provider Note


Provider Note


vss, status same. pulm may try extubate today, labs same, GUS Arriaga MD             May 13, 2019 08:36

## 2019-05-14 VITALS — SYSTOLIC BLOOD PRESSURE: 156 MMHG | DIASTOLIC BLOOD PRESSURE: 81 MMHG

## 2019-05-14 VITALS — SYSTOLIC BLOOD PRESSURE: 136 MMHG | DIASTOLIC BLOOD PRESSURE: 71 MMHG

## 2019-05-14 VITALS — DIASTOLIC BLOOD PRESSURE: 68 MMHG | SYSTOLIC BLOOD PRESSURE: 103 MMHG

## 2019-05-14 VITALS — SYSTOLIC BLOOD PRESSURE: 140 MMHG | DIASTOLIC BLOOD PRESSURE: 69 MMHG

## 2019-05-14 VITALS — SYSTOLIC BLOOD PRESSURE: 168 MMHG | DIASTOLIC BLOOD PRESSURE: 82 MMHG

## 2019-05-14 VITALS — SYSTOLIC BLOOD PRESSURE: 121 MMHG | DIASTOLIC BLOOD PRESSURE: 72 MMHG

## 2019-05-14 VITALS — SYSTOLIC BLOOD PRESSURE: 147 MMHG | DIASTOLIC BLOOD PRESSURE: 79 MMHG

## 2019-05-14 VITALS — DIASTOLIC BLOOD PRESSURE: 48 MMHG | SYSTOLIC BLOOD PRESSURE: 92 MMHG

## 2019-05-14 VITALS — SYSTOLIC BLOOD PRESSURE: 139 MMHG | DIASTOLIC BLOOD PRESSURE: 71 MMHG

## 2019-05-14 VITALS — DIASTOLIC BLOOD PRESSURE: 54 MMHG | SYSTOLIC BLOOD PRESSURE: 92 MMHG

## 2019-05-14 VITALS — SYSTOLIC BLOOD PRESSURE: 126 MMHG | DIASTOLIC BLOOD PRESSURE: 68 MMHG

## 2019-05-14 VITALS — SYSTOLIC BLOOD PRESSURE: 115 MMHG | DIASTOLIC BLOOD PRESSURE: 61 MMHG

## 2019-05-14 VITALS — SYSTOLIC BLOOD PRESSURE: 97 MMHG | DIASTOLIC BLOOD PRESSURE: 58 MMHG

## 2019-05-14 VITALS — DIASTOLIC BLOOD PRESSURE: 78 MMHG | SYSTOLIC BLOOD PRESSURE: 146 MMHG

## 2019-05-14 VITALS — DIASTOLIC BLOOD PRESSURE: 72 MMHG | SYSTOLIC BLOOD PRESSURE: 120 MMHG

## 2019-05-14 VITALS — SYSTOLIC BLOOD PRESSURE: 159 MMHG | DIASTOLIC BLOOD PRESSURE: 65 MMHG

## 2019-05-14 VITALS — SYSTOLIC BLOOD PRESSURE: 128 MMHG | DIASTOLIC BLOOD PRESSURE: 72 MMHG

## 2019-05-14 VITALS — DIASTOLIC BLOOD PRESSURE: 73 MMHG | SYSTOLIC BLOOD PRESSURE: 134 MMHG

## 2019-05-14 VITALS — SYSTOLIC BLOOD PRESSURE: 152 MMHG | DIASTOLIC BLOOD PRESSURE: 72 MMHG

## 2019-05-14 VITALS — DIASTOLIC BLOOD PRESSURE: 83 MMHG | SYSTOLIC BLOOD PRESSURE: 170 MMHG

## 2019-05-14 VITALS — DIASTOLIC BLOOD PRESSURE: 72 MMHG | SYSTOLIC BLOOD PRESSURE: 128 MMHG

## 2019-05-14 VITALS — SYSTOLIC BLOOD PRESSURE: 128 MMHG | DIASTOLIC BLOOD PRESSURE: 68 MMHG

## 2019-05-14 VITALS — DIASTOLIC BLOOD PRESSURE: 49 MMHG | SYSTOLIC BLOOD PRESSURE: 102 MMHG

## 2019-05-14 LAB
ANION GAP SERPL CALC-SCNC: 4 MMOL/L (ref 6–14)
BASE EXCESS ABG: 6 MMOL/L (ref -3–3)
BASE EXCESS ABG: 9 MMOL/L (ref -3–3)
BUN SERPL-MCNC: 55 MG/DL (ref 8–26)
CALCIUM SERPL-MCNC: 8 MG/DL (ref 8.5–10.1)
CHLORIDE SERPL-SCNC: 112 MMOL/L (ref 98–107)
CO2 SERPL-SCNC: 35 MMOL/L (ref 21–32)
CREAT SERPL-MCNC: 0.7 MG/DL (ref 0.7–1.3)
ERYTHROCYTE [DISTWIDTH] IN BLOOD BY AUTOMATED COUNT: 17.4 % (ref 11.5–14.5)
GFR SERPLBLD BASED ON 1.73 SQ M-ARVRAT: 111.5 ML/MIN
GLUCOSE SERPL-MCNC: 199 MG/DL (ref 70–99)
HCO3 BLDA-SCNC: 31 MMOL/L (ref 21–28)
HCO3 BLDA-SCNC: 35 MMOL/L (ref 21–28)
HCT VFR BLD CALC: 36.8 % (ref 39–53)
HGB BLD-MCNC: 11.9 G/DL (ref 13–17.5)
INSPIRATION SETTING TIME VENT: 40
INSPIRATION SETTING TIME VENT: 40
MCH RBC QN AUTO: 28 PG (ref 25–35)
MCHC RBC AUTO-ENTMCNC: 32 G/DL (ref 31–37)
MCV RBC AUTO: 88 FL (ref 79–100)
PCO2 BLDA: 47 MMHG (ref 35–46)
PCO2 BLDA: 57 MMHG (ref 35–46)
PLATELET # BLD AUTO: 171 X10^3/UL (ref 140–400)
PO2 BLDA: 67 MMHG (ref 65–108)
PO2 BLDA: 80 MMHG (ref 65–108)
POTASSIUM SERPL-SCNC: 3.9 MMOL/L (ref 3.5–5.1)
RBC # BLD AUTO: 4.2 X10^6/UL (ref 4.3–5.7)
SAO2 % BLDA: 92 % (ref 92–99)
SAO2 % BLDA: 95 % (ref 92–99)
SODIUM SERPL-SCNC: 151 MMOL/L (ref 136–145)
WBC # BLD AUTO: 8.6 X10^3/UL (ref 4–11)

## 2019-05-14 RX ADMIN — IPRATROPIUM BROMIDE AND ALBUTEROL SULFATE SCH ML: .5; 3 SOLUTION RESPIRATORY (INHALATION) at 12:39

## 2019-05-14 RX ADMIN — DEXMEDETOMIDINE HYDROCHLORIDE PRN MLS/HR: 100 INJECTION, SOLUTION, CONCENTRATE INTRAVENOUS at 22:30

## 2019-05-14 RX ADMIN — FAMOTIDINE SCH MG: 10 INJECTION, SOLUTION INTRAVENOUS at 09:00

## 2019-05-14 RX ADMIN — METHYLPREDNISOLONE SODIUM SUCCINATE SCH MG: 125 INJECTION, POWDER, FOR SOLUTION INTRAMUSCULAR; INTRAVENOUS at 18:21

## 2019-05-14 RX ADMIN — SIMVASTATIN SCH MG: 40 TABLET, FILM COATED ORAL at 20:56

## 2019-05-14 RX ADMIN — DEXMEDETOMIDINE HYDROCHLORIDE PRN MLS/HR: 100 INJECTION, SOLUTION, CONCENTRATE INTRAVENOUS at 14:29

## 2019-05-14 RX ADMIN — PROPOFOL PRN MLS/HR: 10 INJECTION, EMULSION INTRAVENOUS at 03:14

## 2019-05-14 RX ADMIN — IPRATROPIUM BROMIDE AND ALBUTEROL SULFATE SCH ML: .5; 3 SOLUTION RESPIRATORY (INHALATION) at 20:08

## 2019-05-14 RX ADMIN — METHYLPREDNISOLONE SODIUM SUCCINATE SCH MG: 40 INJECTION, POWDER, FOR SOLUTION INTRAMUSCULAR; INTRAVENOUS at 06:09

## 2019-05-14 RX ADMIN — ASPIRIN 81 MG SCH MG: 81 TABLET ORAL at 08:29

## 2019-05-14 RX ADMIN — DEXMEDETOMIDINE HYDROCHLORIDE PRN MLS/HR: 100 INJECTION, SOLUTION, CONCENTRATE INTRAVENOUS at 11:01

## 2019-05-14 RX ADMIN — DEXMEDETOMIDINE HYDROCHLORIDE PRN MLS/HR: 100 INJECTION, SOLUTION, CONCENTRATE INTRAVENOUS at 16:50

## 2019-05-14 RX ADMIN — DEXMEDETOMIDINE HYDROCHLORIDE PRN MLS/HR: 100 INJECTION, SOLUTION, CONCENTRATE INTRAVENOUS at 18:44

## 2019-05-14 RX ADMIN — ENOXAPARIN SODIUM SCH MG: 40 INJECTION SUBCUTANEOUS at 08:30

## 2019-05-14 RX ADMIN — FAMOTIDINE SCH MG: 10 INJECTION, SOLUTION INTRAVENOUS at 20:57

## 2019-05-14 RX ADMIN — CEFTRIAXONE SCH GM: 1 INJECTION, POWDER, FOR SOLUTION INTRAMUSCULAR; INTRAVENOUS at 08:29

## 2019-05-14 RX ADMIN — DEXMEDETOMIDINE HYDROCHLORIDE PRN MLS/HR: 100 INJECTION, SOLUTION, CONCENTRATE INTRAVENOUS at 07:48

## 2019-05-14 RX ADMIN — AZITHROMYCIN MONOHYDRATE SCH MLS/HR: 500 INJECTION, POWDER, LYOPHILIZED, FOR SOLUTION INTRAVENOUS at 08:29

## 2019-05-14 RX ADMIN — DEXMEDETOMIDINE HYDROCHLORIDE PRN MLS/HR: 100 INJECTION, SOLUTION, CONCENTRATE INTRAVENOUS at 20:41

## 2019-05-14 RX ADMIN — IPRATROPIUM BROMIDE AND ALBUTEROL SULFATE SCH ML: .5; 3 SOLUTION RESPIRATORY (INHALATION) at 08:22

## 2019-05-14 RX ADMIN — CLOPIDOGREL BISULFATE SCH MG: 75 TABLET ORAL at 08:29

## 2019-05-14 RX ADMIN — IPRATROPIUM BROMIDE AND ALBUTEROL SULFATE SCH ML: .5; 3 SOLUTION RESPIRATORY (INHALATION) at 15:38

## 2019-05-14 RX ADMIN — PROPOFOL PRN MLS/HR: 10 INJECTION, EMULSION INTRAVENOUS at 07:02

## 2019-05-14 RX ADMIN — PROPOFOL PRN MLS/HR: 10 INJECTION, EMULSION INTRAVENOUS at 21:20

## 2019-05-14 NOTE — PDOC
PULMONARY PROGRESS NOTES


Subjective


PT INTUBATED


HAS FAILED WEANING TRIALS SO FAR


Vitals





Vital Signs








  Date Time  Temp Pulse Resp B/P (MAP) Pulse Ox O2 Delivery O2 Flow Rate FiO2


 


5/14/19 11:00  62 20 159/65 (96) 99 Ventilator  


 


5/14/19 08:00 99.0       





 99.0       








Lungs:  Other (decrease bs)


Cardiovascular:  S1, S2


Abdomen:  Soft


Extremities:  No Edema


Skin:  Warm


Labs





Laboratory Tests








Test


 5/13/19


00:09 5/13/19


06:38 5/13/19


08:45 5/13/19


23:52


 


Glucose (Fingerstick)


 154 mg/dL


(70-99) 179 mg/dL


(70-99) 


 222 mg/dL


(70-99)


 


O2 Saturation   96 % (92-99)  


 


Arterial Blood pH


 


 


 7.37


(7.35-7.45) 





 


Arterial Blood pCO2 at


Patient Temp 


 


 61 mmHg


(35-46) 





 


Arterial Blood pO2 at Patient


Temp 


 


 89 mmHg


() 





 


Arterial Blood HCO3


 


 


 34 mmol/L


(21-28) 





 


Arterial Blood Base Excess


 


 


 7 mmol/L


(-3-3) 





 


FiO2   50  


 


Test


 5/14/19


04:05 5/14/19


09:18 


 





 


White Blood Count


 8.6 x10^3/uL


(4.0-11.0) 


 


 





 


Red Blood Count


 4.20 x10^6/uL


(4.30-5.70) 


 


 





 


Hemoglobin


 11.9 g/dL


(13.0-17.5) 


 


 





 


Hematocrit


 36.8 %


(39.0-53.0) 


 


 





 


Mean Corpuscular Volume 88 fL ()    


 


Mean Corpuscular Hemoglobin 28 pg (25-35)    


 


Mean Corpuscular Hemoglobin


Concent 32 g/dL


(31-37) 


 


 





 


Red Cell Distribution Width


 17.4 %


(11.5-14.5) 


 


 





 


Platelet Count


 171 x10^3/uL


(140-400) 


 


 





 


Sodium Level


 151 mmol/L


(136-145) 


 


 





 


Potassium Level


 3.9 mmol/L


(3.5-5.1) 


 


 





 


Chloride Level


 112 mmol/L


() 


 


 





 


Carbon Dioxide Level


 35 mmol/L


(21-32) 


 


 





 


Anion Gap 4 (6-14)    


 


Blood Urea Nitrogen


 55 mg/dL


(8-26) 


 


 





 


Creatinine


 0.7 mg/dL


(0.7-1.3) 


 


 





 


Estimated GFR


(Cockcroft-Gault) 111.5 


 


 


 





 


Glucose Level


 199 mg/dL


(70-99) 


 


 





 


Calcium Level


 8.0 mg/dL


(8.5-10.1) 


 


 





 


O2 Saturation  95 % (92-99)   


 


Arterial Blood pH


 


 7.44


(7.35-7.45) 


 





 


Arterial Blood pCO2 at


Patient Temp 


 47 mmHg


(35-46) 


 





 


Arterial Blood pO2 at Patient


Temp 


 80 mmHg


() 


 





 


Arterial Blood HCO3


 


 31 mmol/L


(21-28) 


 





 


Arterial Blood Base Excess


 


 6 mmol/L


(-3-3) 


 





 


FiO2  40   








Laboratory Tests








Test


 5/13/19


23:52 5/14/19


04:05 5/14/19


09:18


 


Glucose (Fingerstick)


 222 mg/dL


(70-99) 


 





 


White Blood Count


 


 8.6 x10^3/uL


(4.0-11.0) 





 


Red Blood Count


 


 4.20 x10^6/uL


(4.30-5.70) 





 


Hemoglobin


 


 11.9 g/dL


(13.0-17.5) 





 


Hematocrit


 


 36.8 %


(39.0-53.0) 





 


Mean Corpuscular Volume  88 fL ()  


 


Mean Corpuscular Hemoglobin  28 pg (25-35)  


 


Mean Corpuscular Hemoglobin


Concent 


 32 g/dL


(31-37) 





 


Red Cell Distribution Width


 


 17.4 %


(11.5-14.5) 





 


Platelet Count


 


 171 x10^3/uL


(140-400) 





 


Sodium Level


 


 151 mmol/L


(136-145) 





 


Potassium Level


 


 3.9 mmol/L


(3.5-5.1) 





 


Chloride Level


 


 112 mmol/L


() 





 


Carbon Dioxide Level


 


 35 mmol/L


(21-32) 





 


Anion Gap  4 (6-14)  


 


Blood Urea Nitrogen


 


 55 mg/dL


(8-26) 





 


Creatinine


 


 0.7 mg/dL


(0.7-1.3) 





 


Estimated GFR


(Cockcroft-Gault) 


 111.5 


 





 


Glucose Level


 


 199 mg/dL


(70-99) 





 


Calcium Level


 


 8.0 mg/dL


(8.5-10.1) 





 


O2 Saturation   95 % (92-99) 


 


Arterial Blood pH


 


 


 7.44


(7.35-7.45)


 


Arterial Blood pCO2 at


Patient Temp 


 


 47 mmHg


(35-46)


 


Arterial Blood pO2 at Patient


Temp 


 


 80 mmHg


()


 


Arterial Blood HCO3


 


 


 31 mmol/L


(21-28)


 


Arterial Blood Base Excess


 


 


 6 mmol/L


(-3-3)


 


FiO2   40 








Medications





Active Scripts








 Medications  Dose


 Route/Sig


 Max Daily Dose Days Date Category


 


 Prednisone 20 Mg


 Tablet  30 Mg


 PO DAILY


  5 3/6/19 Reported


 


 Levaquin


  (Levofloxacin)


 500 Mg Tablet  1 Tab


 PO DAILY


  10 3/6/19 Reported


 


 Ibuprofen 400 Mg


 Tablet  400 Mg


 PO PRN Q6HRS PRN


   2/9/18 Reported


 


 Duoneb 0.5-3(2.5)


 Mg/3 Ml


  (Albuterol/Ipratropium)


 3 Ml Ampul.neb  3 Ml


 NEB QID


   2/9/18 Reported


 


 Aspir-Low


  (Aspirin) 81 Mg


 Tablet.dr  1 Tab


 PO DAILY


   4/21/17 Reported


 


 Ventolin Hfa


 Inhaler


  (Albuterol


 Sulfate) 18 Gm


 Hfa.aer.ad  


 


   4/21/17 Reported


 


 Losartan-Hctz


 50-12.5 Mg Tab


  (Losartan/Hydrochlorothiazide)


 1 Each Tablet  1 Tab


 PO DAILY


   4/21/17 Reported


 


 Simvastatin 40 Mg


 Tablet  40 Mg


 PO HS


   6/4/15 Reported


 


 Clopidogrel


  (Clopidogrel


 Bisulfate) 75 Mg


 Tablet  75 Mg


 PO DAILY


   6/4/15 Reported








Comments


CXR 5/14


LLL infiltrate





Impression


.








IMPRESSION:


1.  Acute-on-chronic hypoxic and hypercapnic respiratory failure/ suspect severe

COPD


2.  Abnormal chest x-ray with bilateral interstitial infiltrates, now LLL 

infiltrate


3.  Possible a/c diastolic heart failure


4.  Abnormal D-dimer. clinical suspicion for PE/DVT low


5.  Azotemia.


6.  Septic shock pt with tachycardia lactic acidosis , resolved


7.  Abnormal ABG POA


8.  Protein malnutrition POA moderate to severe


9.  S/P BRONCH ON 5/6











VENOUS DOPPLER 5/6


IMPRESSION:   There is no sonographic evidence of deep vein thrombosis in 


either lower extremity. 





IMPRESSION: 5/6


1. No CT evidence of central pulmonary emboli.


2. Emphysema.


3. Worsening tree-in-bud and interstitial opacities predominantly in the 


lower chest with mild confluent infiltrate posteriorly in the right lower 


lobe. An inflammatory/infectious etiology is most likely.





Plan


.


TRY OFF SEDATION AGAIN/ ADD PRECEDEX TO CONTROL AGITATION


CPAP TRIAL TODAY


D/W RN 


WILL CONTINUE SUPPORT


DIAGNOSTIC BRONCH SO FAR CULTURES NEGATIVE


ABG NOTED 


ANTIBX


ENTERAL NUTRITION


DVT AND GI PROPH


BD


D/W WIFE AT LENGTH/ HE HAS BEEN ON VENT FOR 9 DAYS. IF HE CONTINUES TO FAIL AND 

IF FAMILY WANTS AGGRESSIVE CARE, HE WILL NEED TRACH/ I WILL HAVE A FAMILY 

MEETING WITH DAUGHTER AND WIFE AGAIN IN 


COMPLEX DECISION MAKING D/W WITH TEAM


CCT 30 MIN











ZULMA KNOWLES MD                 May 14, 2019 11:27

## 2019-05-14 NOTE — NUR
SS following up with discharge planning. SS phoned and faxed referral to [facility] 
[facility],[phone]; fax [phone], for screening.

## 2019-05-14 NOTE — PDOC
Provider Note


Provider Note


bun 55, Na+ 151, will inc free water to 200 ml q3 , total 800 ml/24 more, follow

bmp- may be extubated today- red steroids re glucose and risk











GUS CHERY MD             May 14, 2019 08:07

## 2019-05-14 NOTE — NUR
Dr. Davenport here to see patient, wants to stop propofol gtt, do SBT and draw blood gas even if 
patient fails SBT. He spoke to patient's wife, Tracy, by phone to update her with the plan 
of care. They discussed today's SBT and Dr. Davenport explained that if the patient continues to 
fail, the family can either opt for an extubation to Bipap without reintubation or a trach 
to give more long term ventilation. Tracy would like Dr. Davenport to speak with her daughter as 
well. Dr. Davenport arranged a family meeting tomorrow morning to discuss options with the 
family.

## 2019-05-14 NOTE — NUR
Dr. Davenport returned page. He would like to come see the patient, place back on CPAP with a PS 
of 14-16. He will be here to see patient shortly. Communicated with RT.

## 2019-05-14 NOTE — RAD
PORTABLE CHEST 1V

 

History: Intubation

 

Comparison: May 12, 2019

 

Findings:

Single view of the chest is submitted. 

There is endotracheal tube with tip terminating about 3 4 cm from milly 

and enteric catheter coursing in the stomach not fully seen. Cardiac 

silhouette is similar. No pneumothorax is identified. There is increased 

left base airspace opacity. There is no significant pleural fluid.

 

Impression: 

 

1.  There are support catheters and tubes as stated. There is increased 

left base infiltrate/atelectasis.

 

Electronically signed by: Marcus Cuadra MD (5/14/2019 8:04 AM) 

[id number]

## 2019-05-14 NOTE — NUR
SBT complete. ABG drawn. Patient's RR 30-40s, tachycardic, C02 and HCO3 elevated. Results 
sent to Dr. Davenport via text page. Patient placed back on vent settings by RT.

## 2019-05-15 VITALS — SYSTOLIC BLOOD PRESSURE: 161 MMHG | DIASTOLIC BLOOD PRESSURE: 85 MMHG

## 2019-05-15 VITALS — DIASTOLIC BLOOD PRESSURE: 95 MMHG | SYSTOLIC BLOOD PRESSURE: 152 MMHG

## 2019-05-15 VITALS — SYSTOLIC BLOOD PRESSURE: 162 MMHG | DIASTOLIC BLOOD PRESSURE: 88 MMHG

## 2019-05-15 VITALS — DIASTOLIC BLOOD PRESSURE: 78 MMHG | SYSTOLIC BLOOD PRESSURE: 145 MMHG

## 2019-05-15 VITALS — DIASTOLIC BLOOD PRESSURE: 74 MMHG | SYSTOLIC BLOOD PRESSURE: 135 MMHG

## 2019-05-15 VITALS — SYSTOLIC BLOOD PRESSURE: 136 MMHG | DIASTOLIC BLOOD PRESSURE: 77 MMHG

## 2019-05-15 VITALS — SYSTOLIC BLOOD PRESSURE: 116 MMHG | DIASTOLIC BLOOD PRESSURE: 67 MMHG

## 2019-05-15 VITALS — DIASTOLIC BLOOD PRESSURE: 75 MMHG | SYSTOLIC BLOOD PRESSURE: 142 MMHG

## 2019-05-15 VITALS — SYSTOLIC BLOOD PRESSURE: 101 MMHG | DIASTOLIC BLOOD PRESSURE: 53 MMHG

## 2019-05-15 VITALS — SYSTOLIC BLOOD PRESSURE: 100 MMHG | DIASTOLIC BLOOD PRESSURE: 54 MMHG

## 2019-05-15 VITALS — SYSTOLIC BLOOD PRESSURE: 141 MMHG | DIASTOLIC BLOOD PRESSURE: 78 MMHG

## 2019-05-15 VITALS — DIASTOLIC BLOOD PRESSURE: 78 MMHG | SYSTOLIC BLOOD PRESSURE: 133 MMHG

## 2019-05-15 VITALS — SYSTOLIC BLOOD PRESSURE: 140 MMHG | DIASTOLIC BLOOD PRESSURE: 78 MMHG

## 2019-05-15 VITALS — SYSTOLIC BLOOD PRESSURE: 108 MMHG | DIASTOLIC BLOOD PRESSURE: 56 MMHG

## 2019-05-15 VITALS — SYSTOLIC BLOOD PRESSURE: 156 MMHG | DIASTOLIC BLOOD PRESSURE: 82 MMHG

## 2019-05-15 VITALS — SYSTOLIC BLOOD PRESSURE: 140 MMHG | DIASTOLIC BLOOD PRESSURE: 68 MMHG

## 2019-05-15 VITALS — DIASTOLIC BLOOD PRESSURE: 89 MMHG | SYSTOLIC BLOOD PRESSURE: 160 MMHG

## 2019-05-15 VITALS — DIASTOLIC BLOOD PRESSURE: 77 MMHG | SYSTOLIC BLOOD PRESSURE: 151 MMHG

## 2019-05-15 VITALS — DIASTOLIC BLOOD PRESSURE: 67 MMHG | SYSTOLIC BLOOD PRESSURE: 129 MMHG

## 2019-05-15 VITALS — DIASTOLIC BLOOD PRESSURE: 81 MMHG | SYSTOLIC BLOOD PRESSURE: 155 MMHG

## 2019-05-15 VITALS — SYSTOLIC BLOOD PRESSURE: 166 MMHG | DIASTOLIC BLOOD PRESSURE: 82 MMHG

## 2019-05-15 VITALS — DIASTOLIC BLOOD PRESSURE: 95 MMHG | SYSTOLIC BLOOD PRESSURE: 168 MMHG

## 2019-05-15 VITALS — SYSTOLIC BLOOD PRESSURE: 149 MMHG | DIASTOLIC BLOOD PRESSURE: 74 MMHG

## 2019-05-15 VITALS — SYSTOLIC BLOOD PRESSURE: 116 MMHG | DIASTOLIC BLOOD PRESSURE: 62 MMHG

## 2019-05-15 LAB
ANION GAP SERPL CALC-SCNC: 5 MMOL/L (ref 6–14)
BASE EXCESS ABG: 4 MMOL/L (ref -3–3)
BUN SERPL-MCNC: 54 MG/DL (ref 8–26)
CALCIUM SERPL-MCNC: 8.1 MG/DL (ref 8.5–10.1)
CHLORIDE SERPL-SCNC: 112 MMOL/L (ref 98–107)
CO2 SERPL-SCNC: 34 MMOL/L (ref 21–32)
CREAT SERPL-MCNC: 0.7 MG/DL (ref 0.7–1.3)
GFR SERPLBLD BASED ON 1.73 SQ M-ARVRAT: 111.5 ML/MIN
GLUCOSE SERPL-MCNC: 199 MG/DL (ref 70–99)
HCO3 BLDA-SCNC: 28 MMOL/L (ref 21–28)
INSPIRATION SETTING TIME VENT: 40
PCO2 BLDA: 41 MMHG (ref 35–46)
PO2 BLDA: 78 MMHG (ref 65–108)
POTASSIUM SERPL-SCNC: 4.6 MMOL/L (ref 3.5–5.1)
SAO2 % BLDA: 95 % (ref 92–99)
SODIUM SERPL-SCNC: 151 MMOL/L (ref 136–145)

## 2019-05-15 PROCEDURE — 5A09357 ASSISTANCE WITH RESPIRATORY VENTILATION, LESS THAN 24 CONSECUTIVE HOURS, CONTINUOUS POSITIVE AIRWAY PRESSURE: ICD-10-PCS | Performed by: FAMILY MEDICINE

## 2019-05-15 RX ADMIN — METHYLPREDNISOLONE SODIUM SUCCINATE SCH MG: 125 INJECTION, POWDER, FOR SOLUTION INTRAMUSCULAR; INTRAVENOUS at 18:09

## 2019-05-15 RX ADMIN — HALOPERIDOL LACTATE PRN MG: 5 INJECTION, SOLUTION INTRAMUSCULAR at 22:10

## 2019-05-15 RX ADMIN — SIMVASTATIN SCH MG: 40 TABLET, FILM COATED ORAL at 20:35

## 2019-05-15 RX ADMIN — HALOPERIDOL LACTATE PRN MG: 5 INJECTION, SOLUTION INTRAMUSCULAR at 15:31

## 2019-05-15 RX ADMIN — IPRATROPIUM BROMIDE AND ALBUTEROL SULFATE SCH ML: .5; 3 SOLUTION RESPIRATORY (INHALATION) at 12:25

## 2019-05-15 RX ADMIN — ENOXAPARIN SODIUM SCH MG: 40 INJECTION SUBCUTANEOUS at 11:32

## 2019-05-15 RX ADMIN — DEXMEDETOMIDINE HYDROCHLORIDE PRN MLS/HR: 100 INJECTION, SOLUTION, CONCENTRATE INTRAVENOUS at 08:25

## 2019-05-15 RX ADMIN — ASPIRIN 81 MG SCH MG: 81 TABLET ORAL at 08:00

## 2019-05-15 RX ADMIN — CLOPIDOGREL BISULFATE SCH MG: 75 TABLET ORAL at 09:00

## 2019-05-15 RX ADMIN — METHYLPREDNISOLONE SODIUM SUCCINATE SCH MG: 125 INJECTION, POWDER, FOR SOLUTION INTRAMUSCULAR; INTRAVENOUS at 06:39

## 2019-05-15 RX ADMIN — IPRATROPIUM BROMIDE AND ALBUTEROL SULFATE SCH ML: .5; 3 SOLUTION RESPIRATORY (INHALATION) at 16:46

## 2019-05-15 RX ADMIN — DEXMEDETOMIDINE HYDROCHLORIDE PRN MLS/HR: 100 INJECTION, SOLUTION, CONCENTRATE INTRAVENOUS at 05:49

## 2019-05-15 RX ADMIN — DEXMEDETOMIDINE HYDROCHLORIDE PRN MLS/HR: 100 INJECTION, SOLUTION, CONCENTRATE INTRAVENOUS at 00:14

## 2019-05-15 RX ADMIN — IPRATROPIUM BROMIDE AND ALBUTEROL SULFATE SCH ML: .5; 3 SOLUTION RESPIRATORY (INHALATION) at 20:08

## 2019-05-15 RX ADMIN — DEXMEDETOMIDINE HYDROCHLORIDE PRN MLS/HR: 100 INJECTION, SOLUTION, CONCENTRATE INTRAVENOUS at 02:29

## 2019-05-15 RX ADMIN — IPRATROPIUM BROMIDE AND ALBUTEROL SULFATE SCH ML: .5; 3 SOLUTION RESPIRATORY (INHALATION) at 08:05

## 2019-05-15 RX ADMIN — CEFTRIAXONE SCH GM: 1 INJECTION, POWDER, FOR SOLUTION INTRAMUSCULAR; INTRAVENOUS at 14:17

## 2019-05-15 RX ADMIN — FAMOTIDINE SCH MG: 10 INJECTION, SOLUTION INTRAVENOUS at 11:31

## 2019-05-15 RX ADMIN — FAMOTIDINE SCH MG: 10 INJECTION, SOLUTION INTRAVENOUS at 21:03

## 2019-05-15 NOTE — PDOC
PULMONARY PROGRESS NOTES


Subjective


PT INTUBATED


HAS FAILED WEANING TRIALS SO FAR WITH INCREASE WOB, ABG COMPENSATED


Vitals





Vital Signs








  Date Time  Temp Pulse Resp B/P (MAP) Pulse Ox O2 Delivery O2 Flow Rate FiO2


 


5/15/19 08:00     100 Ventilator  


 


5/15/19 07:00  77 28 133/78 (96)    


 


5/15/19 04:00 98.1       





 98.1       








General:  Alert


Lungs:  Other (decrease bs)


Cardiovascular:  S1, S2


Abdomen:  Soft


Extremities:  No Edema


Skin:  Warm


Labs





Laboratory Tests








Test


 5/13/19


23:52 5/14/19


04:05 5/14/19


09:18 5/14/19


13:30


 


Glucose (Fingerstick)


 222 mg/dL


(70-99) 


 


 





 


White Blood Count


 


 8.6 x10^3/uL


(4.0-11.0) 


 





 


Red Blood Count


 


 4.20 x10^6/uL


(4.30-5.70) 


 





 


Hemoglobin


 


 11.9 g/dL


(13.0-17.5) 


 





 


Hematocrit


 


 36.8 %


(39.0-53.0) 


 





 


Mean Corpuscular Volume  88 fL ()   


 


Mean Corpuscular Hemoglobin  28 pg (25-35)   


 


Mean Corpuscular Hemoglobin


Concent 


 32 g/dL


(31-37) 


 





 


Red Cell Distribution Width


 


 17.4 %


(11.5-14.5) 


 





 


Platelet Count


 


 171 x10^3/uL


(140-400) 


 





 


Sodium Level


 


 151 mmol/L


(136-145) 


 





 


Potassium Level


 


 3.9 mmol/L


(3.5-5.1) 


 





 


Chloride Level


 


 112 mmol/L


() 


 





 


Carbon Dioxide Level


 


 35 mmol/L


(21-32) 


 





 


Anion Gap  4 (6-14)   


 


Blood Urea Nitrogen


 


 55 mg/dL


(8-26) 


 





 


Creatinine


 


 0.7 mg/dL


(0.7-1.3) 


 





 


Estimated GFR


(Cockcroft-Gault) 


 111.5 


 


 





 


Glucose Level


 


 199 mg/dL


(70-99) 


 





 


Calcium Level


 


 8.0 mg/dL


(8.5-10.1) 


 





 


O2 Saturation   95 % (92-99)  92 % (92-99) 


 


Arterial Blood pH


 


 


 7.44


(7.35-7.45) 7.41


(7.35-7.45)


 


Arterial Blood pCO2 at


Patient Temp 


 


 47 mmHg


(35-46) 57 mmHg


(35-46)


 


Arterial Blood pO2 at Patient


Temp 


 


 80 mmHg


() 67 mmHg


()


 


Arterial Blood HCO3


 


 


 31 mmol/L


(21-28) 35 mmol/L


(21-28)


 


Arterial Blood Base Excess


 


 


 6 mmol/L


(-3-3) 9 mmol/L


(-3-3)


 


FiO2   40  40 


 


Test


 5/15/19


00:10 5/15/19


04:10 


 





 


Glucose (Fingerstick)


 191 mg/dL


(70-99) 


 


 





 


Sodium Level


 


 151 mmol/L


(136-145) 


 





 


Potassium Level


 


 4.6 mmol/L


(3.5-5.1) 


 





 


Chloride Level


 


 112 mmol/L


() 


 





 


Carbon Dioxide Level


 


 34 mmol/L


(21-32) 


 





 


Anion Gap  5 (6-14)   


 


Blood Urea Nitrogen


 


 54 mg/dL


(8-26) 


 





 


Creatinine


 


 0.7 mg/dL


(0.7-1.3) 


 





 


Estimated GFR


(Cockcroft-Gault) 


 111.5 


 


 





 


Glucose Level


 


 199 mg/dL


(70-99) 


 





 


Calcium Level


 


 8.1 mg/dL


(8.5-10.1) 


 





 


Procalcitonin


 


 < 0.10 ng/mL


(0.00-0.10) 


 











Laboratory Tests








Test


 5/14/19


09:18 5/14/19


13:30 5/15/19


00:10 5/15/19


04:10


 


O2 Saturation 95 % (92-99)  92 % (92-99)   


 


Arterial Blood pH


 7.44


(7.35-7.45) 7.41


(7.35-7.45) 


 





 


Arterial Blood pCO2 at


Patient Temp 47 mmHg


(35-46) 57 mmHg


(35-46) 


 





 


Arterial Blood pO2 at Patient


Temp 80 mmHg


() 67 mmHg


() 


 





 


Arterial Blood HCO3


 31 mmol/L


(21-28) 35 mmol/L


(21-28) 


 





 


Arterial Blood Base Excess


 6 mmol/L


(-3-3) 9 mmol/L


(-3-3) 


 





 


FiO2 40  40   


 


Glucose (Fingerstick)


 


 


 191 mg/dL


(70-99) 





 


Sodium Level


 


 


 


 151 mmol/L


(136-145)


 


Potassium Level


 


 


 


 4.6 mmol/L


(3.5-5.1)


 


Chloride Level


 


 


 


 112 mmol/L


()


 


Carbon Dioxide Level


 


 


 


 34 mmol/L


(21-32)


 


Anion Gap    5 (6-14) 


 


Blood Urea Nitrogen


 


 


 


 54 mg/dL


(8-26)


 


Creatinine


 


 


 


 0.7 mg/dL


(0.7-1.3)


 


Estimated GFR


(Cockcroft-Gault) 


 


 


 111.5 





 


Glucose Level


 


 


 


 199 mg/dL


(70-99)


 


Calcium Level


 


 


 


 8.1 mg/dL


(8.5-10.1)


 


Procalcitonin


 


 


 


 < 0.10 ng/mL


(0.00-0.10)








Medications





Active Scripts








 Medications  Dose


 Route/Sig


 Max Daily Dose Days Date Category


 


 Prednisone 20 Mg


 Tablet  30 Mg


 PO DAILY


  5 3/6/19 Reported


 


 Levaquin


  (Levofloxacin)


 500 Mg Tablet  1 Tab


 PO DAILY


  10 3/6/19 Reported


 


 Ibuprofen 400 Mg


 Tablet  400 Mg


 PO PRN Q6HRS PRN


   2/9/18 Reported


 


 Duoneb 0.5-3(2.5)


 Mg/3 Ml


  (Albuterol/Ipratropium)


 3 Ml Ampul.neb  3 Ml


 NEB QID


   2/9/18 Reported


 


 Aspir-Low


  (Aspirin) 81 Mg


 Tablet.dr  1 Tab


 PO DAILY


   4/21/17 Reported


 


 Ventolin Hfa


 Inhaler


  (Albuterol


 Sulfate) 18 Gm


 Hfa.aer.ad  


 


   4/21/17 Reported


 


 Losartan-Hctz


 50-12.5 Mg Tab


  (Losartan/Hydrochlorothiazide)


 1 Each Tablet  1 Tab


 PO DAILY


   4/21/17 Reported


 


 Simvastatin 40 Mg


 Tablet  40 Mg


 PO HS


   6/4/15 Reported


 


 Clopidogrel


  (Clopidogrel


 Bisulfate) 75 Mg


 Tablet  75 Mg


 PO DAILY


   6/4/15 Reported








Comments


CXR 5/14


LLL infiltrate





Impression


.





IMPRESSION:


1.  Acute-on-chronic hypoxic and hypercapnic respiratory failure/ suspect severe

COPD


2.  Abnormal chest x-ray with bilateral interstitial infiltrates, now LLL 

infiltrate/ Fever


3.  Possible a/c diastolic heart failure


4.  Abnormal D-dimer. clinical suspicion for PE/DVT low


5.  Azotemia.


6.  Septic shock pt with tachycardia lactic acidosis , resolved


7.  Abnormal ABG POA


8.  Protein malnutrition POA moderate to severe


9.  S/P BRONCH ON 5/6











VENOUS DOPPLER 5/6


IMPRESSION:   There is no sonographic evidence of deep vein thrombosis in 


either lower extremity. 





IMPRESSION: 5/6


1. No CT evidence of central pulmonary emboli.


2. Emphysema.


3. Worsening tree-in-bud and interstitial opacities predominantly in the 


lower chest with mild confluent infiltrate posteriorly in the right lower 


lobe. An inflammatory/infectious etiology is most likely.





Plan


.


TRY OFF SEDATION AGAIN/ ADDED PRECEDEX TO CONTROL AGITATION


CPAP TRIAL AGAIN TODAY


D/W RN 


WILL CONTINUE SUPPORT


DIAGNOSTIC BRONCH SO FAR CULTURES NEGATIVE


ABG NOTED 


ANTIBX


ENTERAL NUTRITION


DVT AND GI PROPH


BD


CHANGE ABX


D/W WIFE AT LENGTH/ HE HAS BEEN ON VENT FOR 9 DAYS. IF HE CONTINUES TO FAIL AND 

IF FAMILY WANTS AGGRESSIVE CARE, HE WILL NEED TRACH/ I WILL HAVE A FAMILY 

MEETING AGAIN TODAY WITH DAUGHTER AND WIFE 


COMPLEX DECISION MAKING D/W WITH TEAM


CCT 30 MIN











ZULMA KNOWLES MD                 May 15, 2019 08:49

## 2019-05-15 NOTE — PDOC
Infectious Disease Note


Vital Sign


Vital Signs





Vital Signs








  Date Time  Temp Pulse Resp B/P (MAP) Pulse Ox O2 Delivery O2 Flow Rate FiO2


 


5/15/19 12:29     97 Nasal Cannula 4.0 


 


5/15/19 07:00  77 28 133/78 (96)    


 


5/15/19 04:00 98.1       





 98.1       











Labs


Lab





Laboratory Tests








Test


 5/14/19


13:30 5/15/19


00:10 5/15/19


04:10 5/15/19


08:55


 


O2 Saturation 92 % (92-99)    95 % (92-99) 


 


Arterial Blood pH


 7.41


(7.35-7.45) 


 


 7.45


(7.35-7.45)


 


Arterial Blood pCO2 at


Patient Temp 57 mmHg


(35-46) 


 


 41 mmHg


(35-46)


 


Arterial Blood pO2 at Patient


Temp 67 mmHg


() 


 


 78 mmHg


()


 


Arterial Blood HCO3


 35 mmol/L


(21-28) 


 


 28 mmol/L


(21-28)


 


Arterial Blood Base Excess


 9 mmol/L


(-3-3) 


 


 4 mmol/L


(-3-3)


 


FiO2 40    40 


 


Glucose (Fingerstick)


 


 191 mg/dL


(70-99) 


 





 


Sodium Level


 


 


 151 mmol/L


(136-145) 





 


Potassium Level


 


 


 4.6 mmol/L


(3.5-5.1) 





 


Chloride Level


 


 


 112 mmol/L


() 





 


Carbon Dioxide Level


 


 


 34 mmol/L


(21-32) 





 


Anion Gap   5 (6-14)  


 


Blood Urea Nitrogen


 


 


 54 mg/dL


(8-26) 





 


Creatinine


 


 


 0.7 mg/dL


(0.7-1.3) 





 


Estimated GFR


(Cockcroft-Gault) 


 


 111.5 


 





 


Glucose Level


 


 


 199 mg/dL


(70-99) 





 


Calcium Level


 


 


 8.1 mg/dL


(8.5-10.1) 





 


Procalcitonin


 


 


 < 0.10 ng/mL


(0.00-0.10) 











Micro





Microbiology


5/5/19 Blood Culture - Final, Complete


         NO GROWTH AFTER 5 DAYS


5/6/19 AFB Specimen Processing Tissue - Final, Resulted


         


5/6/19 Acid Fast Bacilli Culture, Resulted


         Pending


5/6/19 Gram Stain - Final, Resulted


         


5/6/19 Fungal Culture - Preliminary, Resulted


         


5/6/19 Fungal Culture Result 1 - Preliminary, Resulted





Objective


Assessment


Low grade temps - core temps 100.6 T max. Had been on Azithromycin 5/5 - 5/15 

and Rocephin 5/6 - 5/15. Nml Procal


Resp failure - extubated today


PCN allergy - doesn't remember reaction


COPD on Steroids





Plan


Plan of Care


Hold abx for now and monitor as clinically has been improving


F/u labs


If spikes will cult and broaden





D/w nursing





Thank you





# [id number]











GURMEET OLSON MD              May 15, 2019 13:31

## 2019-05-15 NOTE — RAD
Single view chest dated 5/15/2019.

 

Comparison made to 5/14/2019.

 

Clinical data indication: Ventilator dependent.

 

FINDINGS: 

Single upright portable exam performed. Endotracheal tube, nasogastric 

tube in place, unchanged. Heart and mediastinal contours are stable.

 

Lungs are somewhat hyperinflated. Patchy airspace disease at the 

retrocardiac left base, similar given differences in technique. No new 

infiltrate or pleural effusion. No pneumothorax.

 

IMPRESSION:

1. No significant interval change in left basilar infiltrate.

2. Stable position of ET tube and NG tube.

 

Electronically signed by: Prateek Neville MD (5/15/2019 9:19 AM) 

[id number]

## 2019-05-15 NOTE — PDOC
Provider Note


Provider Note


will add some iv saline high bun and Na+. to enteral feed- d/w family re 

extubation options











GUS CHERY MD             May 15, 2019 08:57

## 2019-05-15 NOTE — NUR
Dr. Davenport here to speak with patient about wishes for code status. Patient had expressed to 
RN that being intubated "was stupid" and he did not want to be intubated again if necessary. 
RN present at bedside during conversation with Dr. Davenport, and patient expressed to him that 
he "wanted everything done." Patient remains full code at this time.

## 2019-05-15 NOTE — NUR
SS following for discharge planning. Pt currently off vent and requiring oxygen. Pt will 
need PT/OT when able to participate. SS will continue to follow for discharge planning.

## 2019-05-16 VITALS — SYSTOLIC BLOOD PRESSURE: 144 MMHG | DIASTOLIC BLOOD PRESSURE: 79 MMHG

## 2019-05-16 VITALS — SYSTOLIC BLOOD PRESSURE: 149 MMHG | DIASTOLIC BLOOD PRESSURE: 98 MMHG

## 2019-05-16 VITALS — SYSTOLIC BLOOD PRESSURE: 131 MMHG | DIASTOLIC BLOOD PRESSURE: 81 MMHG

## 2019-05-16 VITALS — DIASTOLIC BLOOD PRESSURE: 80 MMHG | SYSTOLIC BLOOD PRESSURE: 152 MMHG

## 2019-05-16 VITALS — DIASTOLIC BLOOD PRESSURE: 83 MMHG | SYSTOLIC BLOOD PRESSURE: 142 MMHG

## 2019-05-16 VITALS — SYSTOLIC BLOOD PRESSURE: 137 MMHG | DIASTOLIC BLOOD PRESSURE: 77 MMHG

## 2019-05-16 VITALS — DIASTOLIC BLOOD PRESSURE: 80 MMHG | SYSTOLIC BLOOD PRESSURE: 160 MMHG

## 2019-05-16 VITALS — DIASTOLIC BLOOD PRESSURE: 91 MMHG | SYSTOLIC BLOOD PRESSURE: 155 MMHG

## 2019-05-16 VITALS — DIASTOLIC BLOOD PRESSURE: 86 MMHG | SYSTOLIC BLOOD PRESSURE: 155 MMHG

## 2019-05-16 VITALS — DIASTOLIC BLOOD PRESSURE: 71 MMHG | SYSTOLIC BLOOD PRESSURE: 136 MMHG

## 2019-05-16 VITALS — SYSTOLIC BLOOD PRESSURE: 141 MMHG | DIASTOLIC BLOOD PRESSURE: 70 MMHG

## 2019-05-16 VITALS — DIASTOLIC BLOOD PRESSURE: 79 MMHG | SYSTOLIC BLOOD PRESSURE: 143 MMHG

## 2019-05-16 LAB
% BANDS: 1 % (ref 0–9)
% LYMPHS: 3 % (ref 24–48)
% SEGS: 95 % (ref 35–66)
ANION GAP SERPL CALC-SCNC: 8 MMOL/L (ref 6–14)
BASOPHILS # BLD AUTO: 0 X10^3/UL (ref 0–0.2)
BASOPHILS NFR BLD: 0 % (ref 0–3)
BUN SERPL-MCNC: 49 MG/DL (ref 8–26)
CALCIUM SERPL-MCNC: 8.5 MG/DL (ref 8.5–10.1)
CHLORIDE SERPL-SCNC: 112 MMOL/L (ref 98–107)
CO2 SERPL-SCNC: 32 MMOL/L (ref 21–32)
CREAT SERPL-MCNC: 0.7 MG/DL (ref 0.7–1.3)
EOSINOPHIL NFR BLD AUTO: 1 % (ref 0–5)
EOSINOPHIL NFR BLD: 0 % (ref 0–3)
EOSINOPHIL NFR BLD: 0 X10^3/UL (ref 0–0.7)
ERYTHROCYTE [DISTWIDTH] IN BLOOD BY AUTOMATED COUNT: 18.1 % (ref 11.5–14.5)
GFR SERPLBLD BASED ON 1.73 SQ M-ARVRAT: 111.5 ML/MIN
GLUCOSE SERPL-MCNC: 150 MG/DL (ref 70–99)
HCT VFR BLD CALC: 38.1 % (ref 39–53)
HGB BLD-MCNC: 12.1 G/DL (ref 13–17.5)
LYMPHOCYTES # BLD: 0.2 X10^3/UL (ref 1–4.8)
LYMPHOCYTES NFR BLD AUTO: 2 % (ref 24–48)
MCH RBC QN AUTO: 28 PG (ref 25–35)
MCHC RBC AUTO-ENTMCNC: 32 G/DL (ref 31–37)
MCV RBC AUTO: 88 FL (ref 79–100)
MONO #: 0.7 X10^3/UL (ref 0–1.1)
MONOCYTES NFR BLD: 6 % (ref 0–9)
NEUT #: 11.5 X10^3UL (ref 1.8–7.7)
NEUTROPHILS NFR BLD AUTO: 92 % (ref 31–73)
PLATELET # BLD AUTO: 167 X10^3/UL (ref 140–400)
PLATELET # BLD EST: ADEQUATE 10*3/UL
POTASSIUM SERPL-SCNC: 3.8 MMOL/L (ref 3.5–5.1)
RBC # BLD AUTO: 4.34 X10^6/UL (ref 4.3–5.7)
SODIUM SERPL-SCNC: 152 MMOL/L (ref 136–145)

## 2019-05-16 PROCEDURE — 5A09357 ASSISTANCE WITH RESPIRATORY VENTILATION, LESS THAN 24 CONSECUTIVE HOURS, CONTINUOUS POSITIVE AIRWAY PRESSURE: ICD-10-PCS | Performed by: FAMILY MEDICINE

## 2019-05-16 RX ADMIN — METHYLPREDNISOLONE SODIUM SUCCINATE SCH MG: 40 INJECTION, POWDER, FOR SOLUTION INTRAMUSCULAR; INTRAVENOUS at 17:38

## 2019-05-16 RX ADMIN — CLOPIDOGREL BISULFATE SCH MG: 75 TABLET ORAL at 09:00

## 2019-05-16 RX ADMIN — ENOXAPARIN SODIUM SCH MG: 40 INJECTION SUBCUTANEOUS at 09:14

## 2019-05-16 RX ADMIN — IPRATROPIUM BROMIDE AND ALBUTEROL SULFATE SCH ML: .5; 3 SOLUTION RESPIRATORY (INHALATION) at 15:58

## 2019-05-16 RX ADMIN — DEXTROSE, SODIUM CHLORIDE, AND POTASSIUM CHLORIDE SCH MLS/HR: 5; .45; .15 INJECTION INTRAVENOUS at 20:20

## 2019-05-16 RX ADMIN — IPRATROPIUM BROMIDE AND ALBUTEROL SULFATE SCH ML: .5; 3 SOLUTION RESPIRATORY (INHALATION) at 12:14

## 2019-05-16 RX ADMIN — METHYLPREDNISOLONE SODIUM SUCCINATE SCH MG: 125 INJECTION, POWDER, FOR SOLUTION INTRAMUSCULAR; INTRAVENOUS at 07:00

## 2019-05-16 RX ADMIN — GLYCERIN, ISOLEUCINE, LEUCINE, LYSINE, METHIONINE, PHENYLALANINE, THREONINE, TRYPTOPHAN, VALINE, ALANINE, GLYCINE, ARGININE, HISTIDINE, PROLINE, SERINE, CYSTEINE, SODIUM ACETATE, MAGNESIUM ACETATE, CALCIUM ACETATE, SODIUM CHLORIDE, POTASSIUM CHLORIDE, PHOSPHORIC ACID, AND POTASSIUM METABISULFITE SCH MLS/HR
3; .21; .27; .22; .16; .17; .12; .046; .2; .21; .42; .29; .085; .34; .18; .014; .2; .054; .026; .12; .15; .041 INJECTION INTRAVENOUS at 11:30

## 2019-05-16 RX ADMIN — FAMOTIDINE SCH MG: 10 INJECTION, SOLUTION INTRAVENOUS at 21:25

## 2019-05-16 RX ADMIN — IPRATROPIUM BROMIDE AND ALBUTEROL SULFATE SCH ML: .5; 3 SOLUTION RESPIRATORY (INHALATION) at 20:03

## 2019-05-16 RX ADMIN — FAMOTIDINE SCH MG: 10 INJECTION, SOLUTION INTRAVENOUS at 09:14

## 2019-05-16 RX ADMIN — DEXTROSE, SODIUM CHLORIDE, AND POTASSIUM CHLORIDE SCH MLS/HR: 5; .45; .15 INJECTION INTRAVENOUS at 10:00

## 2019-05-16 RX ADMIN — SIMVASTATIN SCH MG: 40 TABLET, FILM COATED ORAL at 21:00

## 2019-05-16 RX ADMIN — ASPIRIN 81 MG SCH MG: 81 TABLET ORAL at 08:00

## 2019-05-16 RX ADMIN — IPRATROPIUM BROMIDE AND ALBUTEROL SULFATE SCH ML: .5; 3 SOLUTION RESPIRATORY (INHALATION) at 07:46

## 2019-05-16 NOTE — PDOC2
PALLIATIVE CARE


Palliative Care Note


Palliative Care


Consult requested by Dr. Davenport to address goals of care.


Medical Assessment per medical record;


1.  Acute-on-chronic hypoxic and hypercapnic respiratory failure/ suspect severe

COPD, EXTUBATED 5/15


2.  Abnormal chest x-ray with bilateral interstitial infiltrates, now LLL 

infiltrate/ Fever, improving


3.  Possible a/c diastolic heart failure


4.  Abnormal D-dimer. clinical suspicion for PE/DVT low


5.  Azotemia.


6.  Septic shock pt with tachycardia lactic acidosis , resolved


7.  Abnormal ABG POA


8.  Protein malnutrition POA moderate to severe


9.  S/P BRONCH ON 5/6


VENOUS DOPPLER 5/6


IMPRESSION:   There is no sonographic evidence of deep vein thrombosis in 


either lower extremity. 


IMPRESSION: 5/6


1. No CT evidence of central pulmonary emboli.


2. Emphysema.


3. Worsening tree-in-bud and interstitial opacities predominantly in the 


lower chest with mild confluent infiltrate posteriorly in the right lower 


lobe. An inflammatory/infectious etiology is most likely.





Patient lethargic.  Sitting up in chair.  Extubated today.


Patient is confused.  Oriented to person only. 





spoke with wife Tracy.  She would like to have their daughter here to discuss 

goals.


Plan meeting tomorrow at 0830











MARYURI JAMES                  May 16, 2019 15:15

## 2019-05-16 NOTE — CONS
DATE OF CONSULTATION:  05/15/2019



INFECTIOUS DISEASE CONSULTATION:



PATIENT'S ROOM:  ICU 12.



REQUESTING PHYSICIAN:  Dr. Davenport.



REASON FOR CONSULTATION:  Fever.



HISTORY OF PRESENT ILLNESS:  The patient is a 69-year-old gentleman with a known

history of COPD, who was admitted to [facility] about the 5th of

May secondary to respiratory failure.  Subsequently, he was found to have acute

on chronic hypoxic hypercapnic respiratory failure and was placed on

azithromycin and Rocephin.  The azithromycin was started on the 5th.  Rocephin

was started on the 6th and both of them were discontinued today.  Additionally,

he had been placed on steroids, which are continuing.  He had a bronchoscopy

cultures as well as blood cultures that were obtained have all been negative. 

Today, the antibiotics were discontinued.  He has been extubated and he has been

improving; however, last evening, he did have a core temperature as high as

100.6.  Hence, I have been consulted.  Temperature obtained via Harmon.



Currently, the patient is lying in bed.  He is asking when he can go home.  He

denies any fevers or chills or sweats.  He has no headaches.  He has no sore

throat.  He has no nausea, vomiting, does complain of a little bit of a dry

throat.



PAST MEDICAL HISTORY:  Positive for COPD, hypoxic respiratory failure, coronary

artery disease, prior CVA, hypertension, hyperlipidemia, previous DVT.



PAST SURGICAL HISTORY:  Positive for cholecystectomy as well as left femoral

bypass surgery.



REVIEW OF SYSTEMS:  Otherwise negative except as mentioned above.



ALLERGIES:  LISTED AS PENICILLIN.  He states he is not allergic, does not

remember and CODEINE is listed as well.



SOCIAL HISTORY:  He is a smoker, no alcohol.



FAMILY HISTORY:  Noncontributory.



CURRENT MEDICATIONS:  Include aspirin, Plavix, Lovenox, Pepcid, Solu-Medrol,

Zocor and azithromycin and Rocephin have been discontinued today.



PHYSICAL EXAMINATION:

VITAL SIGNS:  T-max has been 100.6 as a core temperature, most recent is 98,

pulse 77, respirations 20, blood pressure 133/78, satting 97% on 4 L nasal

cannula.

CONSTITUTIONAL:  He is alert, cooperative.  He is in no acute distress.

HEENT:  Pupils are equal and reactive.  Normal conjunctivae.  Oral cavity,

pharynx was dry.

NECK:  Supple, no JVD.

LUNGS:  Clear without wheeze.

HEART:  S1, S2.

ABDOMEN:  Soft, nontender, no guarding.

GENITOURINARY:  Harmon is in place.

EXTREMITIES:  Without clubbing, cyanosis nor gross edema.

SKIN:  Warm to touch without signs of rash.  He has tattoos.

NEUROLOGIC:  He is nonfocal, moves all extremities.  Affect is appropriate.



LABORATORY DATA:  From the 14th show white count of 8.6, hemoglobin 11.9, and

platelets of 171.  Creatinine today is 0.7, glucose was 199.  Procalcitonin of

less than 0.1.  Urinalysis from the 5th was negative.  MRSA screen from

03/01/2019 was negative.  He had normal liver function study tests at arrival. 

Most recent chest x-ray:  Lungs are somewhat hyperinflated, patchy airspace

disease in the retrocardiac space.  No new infiltrate or pleural effusion, no

pneumothorax.



IMPRESSION:

1.  He has low grade temperatures.  He has had actually core temperatures 100.6,

that were taken at night, possibly he could have been under a number of blankets

as well.  Had been on azithromycin from 05/05/2019 until today as well as

Rocephin since 05/06/2019 until today.  He has a normal procalcitonin, normal

white blood cell count and clinically he is improving.

2.  Respiratory failure, extubated today.

3.  PENICILLIN ALLERGY, does not remember reaction.

4.  Chronic obstructive pulmonary disease, currently on steroids.



RECOMMENDATIONS:  For now, hold further antibiotics.  Monitor as he seems to be

clinically improving.  We will follow up labs.  If he spikes, we will culture

and broaden out.



Discussed with nursing.



Thank you for asking us to participate in this patient's care.  If you have any

questions, please do not hesitate to contact me.

 



______________________________

GURMEET OLSON MD



DR:  KATLIN/skylar  JOB#:  [id number] / [id number]

DD:  05/15/2019 13:30  DT:  05/16/2019 03:30

## 2019-05-16 NOTE — PDOC
PULMONARY PROGRESS NOTES


Subjective


EXTUBATED 5/15


DOING BETTER, ON CANULA


Vitals





Vital Signs








  Date Time  Temp Pulse Resp B/P (MAP) Pulse Ox O2 Delivery O2 Flow Rate FiO2


 


5/16/19 08:19       4.0 


 


5/16/19 08:00  86 16 152/80 (104) 100 Nasal Cannula  


 


5/16/19 07:00 98.4       





 98.4       








General:  Alert, No acute distress


Lungs:  Other (decrease bs)


Cardiovascular:  S1, S2


Abdomen:  Soft


Extremities:  No Edema


Skin:  Warm


Labs





Laboratory Tests








Test


 5/14/19


13:30 5/15/19


00:10 5/15/19


04:10 5/15/19


08:55


 


O2 Saturation 92 % (92-99)    95 % (92-99) 


 


Arterial Blood pH


 7.41


(7.35-7.45) 


 


 7.45


(7.35-7.45)


 


Arterial Blood pCO2 at


Patient Temp 57 mmHg


(35-46) 


 


 41 mmHg


(35-46)


 


Arterial Blood pO2 at Patient


Temp 67 mmHg


() 


 


 78 mmHg


()


 


Arterial Blood HCO3


 35 mmol/L


(21-28) 


 


 28 mmol/L


(21-28)


 


Arterial Blood Base Excess


 9 mmol/L


(-3-3) 


 


 4 mmol/L


(-3-3)


 


FiO2 40    40 


 


Glucose (Fingerstick)


 


 191 mg/dL


(70-99) 


 





 


Sodium Level


 


 


 151 mmol/L


(136-145) 





 


Potassium Level


 


 


 4.6 mmol/L


(3.5-5.1) 





 


Chloride Level


 


 


 112 mmol/L


() 





 


Carbon Dioxide Level


 


 


 34 mmol/L


(21-32) 





 


Anion Gap   5 (6-14)  


 


Blood Urea Nitrogen


 


 


 54 mg/dL


(8-26) 





 


Creatinine


 


 


 0.7 mg/dL


(0.7-1.3) 





 


Estimated GFR


(Cockcroft-Gault) 


 


 111.5 


 





 


Glucose Level


 


 


 199 mg/dL


(70-99) 





 


Calcium Level


 


 


 8.1 mg/dL


(8.5-10.1) 





 


Procalcitonin


 


 


 < 0.10 ng/mL


(0.00-0.10) 





 


Test


 5/16/19


05:20 


 


 





 


White Blood Count


 12.5 x10^3/uL


(4.0-11.0) 


 


 





 


Red Blood Count


 4.34 x10^6/uL


(4.30-5.70) 


 


 





 


Hemoglobin


 12.1 g/dL


(13.0-17.5) 


 


 





 


Hematocrit


 38.1 %


(39.0-53.0) 


 


 





 


Mean Corpuscular Volume 88 fL ()    


 


Mean Corpuscular Hemoglobin 28 pg (25-35)    


 


Mean Corpuscular Hemoglobin


Concent 32 g/dL


(31-37) 


 


 





 


Red Cell Distribution Width


 18.1 %


(11.5-14.5) 


 


 





 


Platelet Count


 167 x10^3/uL


(140-400) 


 


 





 


Neutrophils (%) (Auto) 92 % (31-73)    


 


Lymphocytes (%) (Auto) 2 % (24-48)    


 


Monocytes (%) (Auto) 6 % (0-9)    


 


Eosinophils (%) (Auto) 0 % (0-3)    


 


Basophils (%) (Auto) 0 % (0-3)    


 


Neutrophils # (Auto)


 11.5 x10^3uL


(1.8-7.7) 


 


 





 


Lymphocytes # (Auto)


 0.2 x10^3/uL


(1.0-4.8) 


 


 





 


Monocytes # (Auto)


 0.7 x10^3/uL


(0.0-1.1) 


 


 





 


Eosinophils # (Auto)


 0.0 x10^3/uL


(0.0-0.7) 


 


 





 


Basophils # (Auto)


 0.0 x10^3/uL


(0.0-0.2) 


 


 





 


Segmented Neutrophils % 95 % (35-66)    


 


Band Neutrophils % 1 % (0-9)    


 


Lymphocytes % 3 % (24-48)    


 


Eosinophils % 1 % (0-5)    


 


Platelet Estimate


 Adequate


(ADEQUATE) 


 


 





 


Sodium Level


 152 mmol/L


(136-145) 


 


 





 


Potassium Level


 3.8 mmol/L


(3.5-5.1) 


 


 





 


Chloride Level


 112 mmol/L


() 


 


 





 


Carbon Dioxide Level


 32 mmol/L


(21-32) 


 


 





 


Anion Gap 8 (6-14)    


 


Blood Urea Nitrogen


 49 mg/dL


(8-26) 


 


 





 


Creatinine


 0.7 mg/dL


(0.7-1.3) 


 


 





 


Estimated GFR


(Cockcroft-Gault) 111.5 


 


 


 





 


Glucose Level


 150 mg/dL


(70-99) 


 


 





 


Calcium Level


 8.5 mg/dL


(8.5-10.1) 


 


 











Laboratory Tests








Test


 5/16/19


05:20


 


White Blood Count


 12.5 x10^3/uL


(4.0-11.0)


 


Red Blood Count


 4.34 x10^6/uL


(4.30-5.70)


 


Hemoglobin


 12.1 g/dL


(13.0-17.5)


 


Hematocrit


 38.1 %


(39.0-53.0)


 


Mean Corpuscular Volume 88 fL () 


 


Mean Corpuscular Hemoglobin 28 pg (25-35) 


 


Mean Corpuscular Hemoglobin


Concent 32 g/dL


(31-37)


 


Red Cell Distribution Width


 18.1 %


(11.5-14.5)


 


Platelet Count


 167 x10^3/uL


(140-400)


 


Neutrophils (%) (Auto) 92 % (31-73) 


 


Lymphocytes (%) (Auto) 2 % (24-48) 


 


Monocytes (%) (Auto) 6 % (0-9) 


 


Eosinophils (%) (Auto) 0 % (0-3) 


 


Basophils (%) (Auto) 0 % (0-3) 


 


Neutrophils # (Auto)


 11.5 x10^3uL


(1.8-7.7)


 


Lymphocytes # (Auto)


 0.2 x10^3/uL


(1.0-4.8)


 


Monocytes # (Auto)


 0.7 x10^3/uL


(0.0-1.1)


 


Eosinophils # (Auto)


 0.0 x10^3/uL


(0.0-0.7)


 


Basophils # (Auto)


 0.0 x10^3/uL


(0.0-0.2)


 


Segmented Neutrophils % 95 % (35-66) 


 


Band Neutrophils % 1 % (0-9) 


 


Lymphocytes % 3 % (24-48) 


 


Eosinophils % 1 % (0-5) 


 


Platelet Estimate


 Adequate


(ADEQUATE)


 


Sodium Level


 152 mmol/L


(136-145)


 


Potassium Level


 3.8 mmol/L


(3.5-5.1)


 


Chloride Level


 112 mmol/L


()


 


Carbon Dioxide Level


 32 mmol/L


(21-32)


 


Anion Gap 8 (6-14) 


 


Blood Urea Nitrogen


 49 mg/dL


(8-26)


 


Creatinine


 0.7 mg/dL


(0.7-1.3)


 


Estimated GFR


(Cockcroft-Gault) 111.5 





 


Glucose Level


 150 mg/dL


(70-99)


 


Calcium Level


 8.5 mg/dL


(8.5-10.1)








Medications





Active Scripts








 Medications  Dose


 Route/Sig


 Max Daily Dose Days Date Category


 


 Prednisone 20 Mg


 Tablet  30 Mg


 PO DAILY


  5 3/6/19 Reported


 


 Levaquin


  (Levofloxacin)


 500 Mg Tablet  1 Tab


 PO DAILY


  10 3/6/19 Reported


 


 Ibuprofen 400 Mg


 Tablet  400 Mg


 PO PRN Q6HRS PRN


   2/9/18 Reported


 


 Duoneb 0.5-3(2.5)


 Mg/3 Ml


  (Albuterol/Ipratropium)


 3 Ml Ampul.neb  3 Ml


 NEB QID


   2/9/18 Reported


 


 Aspir-Low


  (Aspirin) 81 Mg


 Tablet.dr  1 Tab


 PO DAILY


   4/21/17 Reported


 


 Ventolin Hfa


 Inhaler


  (Albuterol


 Sulfate) 18 Gm


 Hfa.aer.ad  


 


   4/21/17 Reported


 


 Losartan-Hctz


 50-12.5 Mg Tab


  (Losartan/Hydrochlorothiazide)


 1 Each Tablet  1 Tab


 PO DAILY


   4/21/17 Reported


 


 Simvastatin 40 Mg


 Tablet  40 Mg


 PO HS


   6/4/15 Reported


 


 Clopidogrel


  (Clopidogrel


 Bisulfate) 75 Mg


 Tablet  75 Mg


 PO DAILY


   6/4/15 Reported








Comments


CXR 5/14


LLL infiltrate





Impression


.





IMPRESSION:


1.  Acute-on-chronic hypoxic and hypercapnic respiratory failure/ suspect severe

COPD, EXTUBATED 5/15


2.  Abnormal chest x-ray with bilateral interstitial infiltrates, now LLL 

infiltrate/ Fever, improving


3.  Possible a/c diastolic heart failure


4.  Abnormal D-dimer. clinical suspicion for PE/DVT low


5.  Azotemia.


6.  Septic shock pt with tachycardia lactic acidosis , resolved


7.  Abnormal ABG POA


8.  Protein malnutrition POA moderate to severe


9.  S/P BRONCH ON 5/6











VENOUS DOPPLER 5/6


IMPRESSION:   There is no sonographic evidence of deep vein thrombosis in 


either lower extremity. 





IMPRESSION: 5/6


1. No CT evidence of central pulmonary emboli.


2. Emphysema.


3. Worsening tree-in-bud and interstitial opacities predominantly in the 


lower chest with mild confluent infiltrate posteriorly in the right lower 


lobe. An inflammatory/infectious etiology is most likely.





Plan


.


EXTUBATED/ ON CANULA


BIPAP QHS


WILL CONTINUE SUPPORT


DIAGNOSTIC BRONCH SO FAR CULTURES NEGATIVE


ABG NOTED 


ANTIBX


SPEECH EVAL


PT CONSULT


DVT AND GI PROPH


BD


OFF ABX, NO FURTHER FEVER


D/W PT MULTIPLE TIMES ABOUT CODE STATUS. CHANGES DECISION MULTIPLE TIMES . I 

THINK HE IS STILL CONFUSED.ASK PALLIATIVE CARE TO DETERMINE GOALS OF CARE WITH 

FAMILY











ZULMA KNOWLES MD                 May 16, 2019 10:11

## 2019-05-16 NOTE — PDOC
Provider Note


Provider Note


alert, nad, vss- good output- labs ok, Na+ better, still prerenal so will resume

iv fluids- d/w wife, he still stetes he want reintubate if needed











GUS CHERY MD             May 16, 2019 09:07

## 2019-05-16 NOTE — NUR
SS following up with discharge planning. SS phoned and faxed updated clinical to [facility], [phone]; fax [phone].

## 2019-05-16 NOTE — PDOC
Infectious Disease Note


Subjective


Subjective


On Bipap but denies F/C/S/N/SOA.  States he is hungry





ROS


ROS


o/w neg





Vital Sign


Vital Signs





Vital Signs








  Date Time  Temp Pulse Resp B/P (MAP) Pulse Ox O2 Delivery O2 Flow Rate FiO2


 


5/16/19 06:00  96 16 131/81 (98) 100 BiPAP/CPAP  


 


5/16/19 04:00 98.6       





 98.6       


 


5/15/19 23:45       4.0 











Physical Exam


PHYSICAL EXAM


CONSTITUTIONAL:  He is alert, NAD. responds to questions.  He is on Bipap..


HEENT:  Pupils are equal and reactive.  Normal conjunctivae.  Oral cavity,


pharynx was dry.


NECK:  Supple, no JVD.


LUNGS:  Clear without wheeze.


HEART:  S1, S2.


ABDOMEN:  Soft, nontender, no guarding.


GENITOURINARY:  Harmon is in place.


EXTREMITIES:  Without clubbing, cyanosis nor gross edema.MITTS


SKIN:  Warm to touch without signs of rash.  He has tattoos.


NEUROLOGIC:  He is nonfocal, moves all extremities.  Affect is appropriate.





Labs


Lab





Laboratory Tests








Test


 5/15/19


08:55 5/16/19


05:20


 


O2 Saturation 95 % (92-99)  


 


Arterial Blood pH


 7.45


(7.35-7.45) 





 


Arterial Blood pCO2 at


Patient Temp 41 mmHg


(35-46) 





 


Arterial Blood pO2 at Patient


Temp 78 mmHg


() 





 


Arterial Blood HCO3


 28 mmol/L


(21-28) 





 


Arterial Blood Base Excess


 4 mmol/L


(-3-3) 





 


FiO2 40  


 


White Blood Count


 


 12.5 x10^3/uL


(4.0-11.0)


 


Red Blood Count


 


 4.34 x10^6/uL


(4.30-5.70)


 


Hemoglobin


 


 12.1 g/dL


(13.0-17.5)


 


Hematocrit


 


 38.1 %


(39.0-53.0)


 


Mean Corpuscular Volume  88 fL () 


 


Mean Corpuscular Hemoglobin  28 pg (25-35) 


 


Mean Corpuscular Hemoglobin


Concent 


 32 g/dL


(31-37)


 


Red Cell Distribution Width


 


 18.1 %


(11.5-14.5)


 


Platelet Count


 


 167 x10^3/uL


(140-400)


 


Neutrophils (%) (Auto)  92 % (31-73) 


 


Lymphocytes (%) (Auto)  2 % (24-48) 


 


Monocytes (%) (Auto)  6 % (0-9) 


 


Eosinophils (%) (Auto)  0 % (0-3) 


 


Basophils (%) (Auto)  0 % (0-3) 


 


Neutrophils # (Auto)


 


 11.5 x10^3uL


(1.8-7.7)


 


Lymphocytes # (Auto)


 


 0.2 x10^3/uL


(1.0-4.8)


 


Monocytes # (Auto)


 


 0.7 x10^3/uL


(0.0-1.1)


 


Eosinophils # (Auto)


 


 0.0 x10^3/uL


(0.0-0.7)


 


Basophils # (Auto)


 


 0.0 x10^3/uL


(0.0-0.2)


 


Sodium Level


 


 152 mmol/L


(136-145)


 


Potassium Level


 


 3.8 mmol/L


(3.5-5.1)


 


Chloride Level


 


 112 mmol/L


()


 


Carbon Dioxide Level


 


 32 mmol/L


(21-32)


 


Anion Gap  8 (6-14) 


 


Blood Urea Nitrogen


 


 49 mg/dL


(8-26)


 


Creatinine


 


 0.7 mg/dL


(0.7-1.3)


 


Estimated GFR


(Cockcroft-Gault) 


 111.5 





 


Glucose Level


 


 150 mg/dL


(70-99)


 


Calcium Level


 


 8.5 mg/dL


(8.5-10.1)








Micro





Microbiology


5/5/19 Blood Culture - Final, Complete


         NO GROWTH AFTER 5 DAYS


5/6/19 AFB Specimen Processing Tissue - Final, Resulted


         


5/6/19 Acid Fast Bacilli Culture, Resulted


         Pending


5/6/19 Gram Stain - Final, Resulted


         


5/6/19 Fungal Culture - Preliminary, Resulted


         


5/6/19 Fungal Culture Result 1 - Preliminary, Resulted





Objective


Assessment


Low grade temps resolved - core temps 100.6 T max. Had been on Azithromycin 5/5 

- 5/15 and Rocephin 5/6 - 5/15. Nml Procal


Leukocytosis - on steroids


Resp failure - improved


PCN allergy - doesn't remember reaction


COPD





Plan


Plan of Care


Hold abx for now and monitor as clinically has been improving


F/u labs


If spikes will cult and broaden





D/w nursing





D/w family











GURMEET OLSON MD              May 16, 2019 07:47

## 2019-05-17 VITALS — SYSTOLIC BLOOD PRESSURE: 136 MMHG | DIASTOLIC BLOOD PRESSURE: 68 MMHG

## 2019-05-17 VITALS — DIASTOLIC BLOOD PRESSURE: 77 MMHG | SYSTOLIC BLOOD PRESSURE: 146 MMHG

## 2019-05-17 VITALS — DIASTOLIC BLOOD PRESSURE: 96 MMHG | SYSTOLIC BLOOD PRESSURE: 179 MMHG

## 2019-05-17 VITALS — SYSTOLIC BLOOD PRESSURE: 168 MMHG | DIASTOLIC BLOOD PRESSURE: 97 MMHG

## 2019-05-17 VITALS — SYSTOLIC BLOOD PRESSURE: 145 MMHG | DIASTOLIC BLOOD PRESSURE: 88 MMHG

## 2019-05-17 VITALS — DIASTOLIC BLOOD PRESSURE: 96 MMHG | SYSTOLIC BLOOD PRESSURE: 172 MMHG

## 2019-05-17 LAB
ANION GAP SERPL CALC-SCNC: 7 MMOL/L (ref 6–14)
BUN SERPL-MCNC: 47 MG/DL (ref 8–26)
CALCIUM SERPL-MCNC: 8 MG/DL (ref 8.5–10.1)
CHLORIDE SERPL-SCNC: 114 MMOL/L (ref 98–107)
CO2 SERPL-SCNC: 29 MMOL/L (ref 21–32)
CREAT SERPL-MCNC: 0.7 MG/DL (ref 0.7–1.3)
GFR SERPLBLD BASED ON 1.73 SQ M-ARVRAT: 111.5 ML/MIN
GLUCOSE SERPL-MCNC: 145 MG/DL (ref 70–99)
POTASSIUM SERPL-SCNC: 4.6 MMOL/L (ref 3.5–5.1)
SODIUM SERPL-SCNC: 150 MMOL/L (ref 136–145)
WBC # BLD AUTO: 12.5 X10^3/UL (ref 4–11)

## 2019-05-17 PROCEDURE — 5A09357 ASSISTANCE WITH RESPIRATORY VENTILATION, LESS THAN 24 CONSECUTIVE HOURS, CONTINUOUS POSITIVE AIRWAY PRESSURE: ICD-10-PCS | Performed by: FAMILY MEDICINE

## 2019-05-17 RX ADMIN — ASPIRIN 81 MG SCH MG: 81 TABLET ORAL at 08:00

## 2019-05-17 RX ADMIN — GLYCERIN, ISOLEUCINE, LEUCINE, LYSINE, METHIONINE, PHENYLALANINE, THREONINE, TRYPTOPHAN, VALINE, ALANINE, GLYCINE, ARGININE, HISTIDINE, PROLINE, SERINE, CYSTEINE, SODIUM ACETATE, MAGNESIUM ACETATE, CALCIUM ACETATE, SODIUM CHLORIDE, POTASSIUM CHLORIDE, PHOSPHORIC ACID, AND POTASSIUM METABISULFITE SCH MLS/HR
3; .21; .27; .22; .16; .17; .12; .046; .2; .21; .42; .29; .085; .34; .18; .014; .2; .054; .026; .12; .15; .041 INJECTION INTRAVENOUS at 03:18

## 2019-05-17 RX ADMIN — DEXTROSE, SODIUM CHLORIDE, AND POTASSIUM CHLORIDE SCH MLS/HR: 5; .45; .15 INJECTION INTRAVENOUS at 06:29

## 2019-05-17 RX ADMIN — FAMOTIDINE SCH MG: 10 INJECTION, SOLUTION INTRAVENOUS at 10:11

## 2019-05-17 RX ADMIN — METHYLPREDNISOLONE SODIUM SUCCINATE SCH MG: 40 INJECTION, POWDER, FOR SOLUTION INTRAMUSCULAR; INTRAVENOUS at 06:32

## 2019-05-17 RX ADMIN — IPRATROPIUM BROMIDE AND ALBUTEROL SULFATE SCH ML: .5; 3 SOLUTION RESPIRATORY (INHALATION) at 07:52

## 2019-05-17 RX ADMIN — GLYCERIN, ISOLEUCINE, LEUCINE, LYSINE, METHIONINE, PHENYLALANINE, THREONINE, TRYPTOPHAN, VALINE, ALANINE, GLYCINE, ARGININE, HISTIDINE, PROLINE, SERINE, CYSTEINE, SODIUM ACETATE, MAGNESIUM ACETATE, CALCIUM ACETATE, SODIUM CHLORIDE, POTASSIUM CHLORIDE, PHOSPHORIC ACID, AND POTASSIUM METABISULFITE SCH MLS/HR
3; .21; .27; .22; .16; .17; .12; .046; .2; .21; .42; .29; .085; .34; .18; .014; .2; .054; .026; .12; .15; .041 INJECTION INTRAVENOUS at 11:55

## 2019-05-17 RX ADMIN — DEXTROSE, SODIUM CHLORIDE, AND POTASSIUM CHLORIDE SCH MLS/HR: 5; .45; .15 INJECTION INTRAVENOUS at 21:23

## 2019-05-17 RX ADMIN — METHYLPREDNISOLONE SODIUM SUCCINATE SCH MG: 40 INJECTION, POWDER, FOR SOLUTION INTRAMUSCULAR; INTRAVENOUS at 18:13

## 2019-05-17 RX ADMIN — HYDRALAZINE HYDROCHLORIDE PRN MG: 20 INJECTION INTRAMUSCULAR; INTRAVENOUS at 16:06

## 2019-05-17 RX ADMIN — ENOXAPARIN SODIUM SCH MG: 40 INJECTION SUBCUTANEOUS at 10:11

## 2019-05-17 RX ADMIN — GLYCERIN, ISOLEUCINE, LEUCINE, LYSINE, METHIONINE, PHENYLALANINE, THREONINE, TRYPTOPHAN, VALINE, ALANINE, GLYCINE, ARGININE, HISTIDINE, PROLINE, SERINE, CYSTEINE, SODIUM ACETATE, MAGNESIUM ACETATE, CALCIUM ACETATE, SODIUM CHLORIDE, POTASSIUM CHLORIDE, PHOSPHORIC ACID, AND POTASSIUM METABISULFITE SCH MLS/HR
3; .21; .27; .22; .16; .17; .12; .046; .2; .21; .42; .29; .085; .34; .18; .014; .2; .054; .026; .12; .15; .041 INJECTION INTRAVENOUS at 18:01

## 2019-05-17 RX ADMIN — FAMOTIDINE SCH MG: 10 INJECTION, SOLUTION INTRAVENOUS at 21:24

## 2019-05-17 RX ADMIN — CLOPIDOGREL BISULFATE SCH MG: 75 TABLET ORAL at 09:00

## 2019-05-17 RX ADMIN — IPRATROPIUM BROMIDE AND ALBUTEROL SULFATE SCH ML: .5; 3 SOLUTION RESPIRATORY (INHALATION) at 20:33

## 2019-05-17 RX ADMIN — SIMVASTATIN SCH MG: 40 TABLET, FILM COATED ORAL at 19:40

## 2019-05-17 RX ADMIN — IPRATROPIUM BROMIDE AND ALBUTEROL SULFATE SCH ML: .5; 3 SOLUTION RESPIRATORY (INHALATION) at 15:17

## 2019-05-17 NOTE — NUR
Pt IV fluids stopped for transfer from ICU to 6S, then restarted. Bag not done currently, 
rescan when due.

## 2019-05-17 NOTE — PDOC
Provider Note


Provider Note


vss, good output- out of room for ? scan- guanaco repeat bmp re NA+ , prerenal- on 

procal and iv fluid to total 180/hr, will reduce fluid- still npo











GUS CHERY MD             May 17, 2019 09:14

## 2019-05-17 NOTE — PDOC2
PALLIATIVE CARE


Palliative Care Note


Palliative Care


Patient seen while still in ICU. Sitting up in chair.  More alert today.  


Received permission to talk with family about goals of care. 


Discussed having ET tube replaced if needed.  Stated he did not want tube again 

---would rather be kept comfortable. 





Met with wife Tracy, daughter Laurence and niece Cortney.  


Reviewed medical condition; COPD with elevated CO2 levels; dysphagia, p/c 

malnutrition, possible a/c heart failure. 


Patient has smoked for 55 years and continued to smoke prior to coming to 

hospital.  Family members also smoke. 


Encouraged family to stop smoking.  


Informed family patient has said he did not want ET tube if needed.  They 

support that decision.  Wife states she does not want to see him suffer. 


Discussed resuscitation.  Family supports DNR/DNI. 





Discussed options for care when discharged.  Patient has been screened at 

[facility]. Await Insurance input 


Would need repeat Swallow evaluation.  Will need SNU at minimum. 


Did discuss hospice and inform family of services.  Patient had been followed by

Home Health prior to admission. 





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 





Wife shared that patient had told her prior to coming in that he was dying.  


Discussed AD. will ask patient if he wants to compete document.  





Patient moved to Rm [id number].  Will confirm with patient again regarding code status 


Discussed with [facility] options for discharge.  May not qualify for [facility].  





1800 Requested by staff to have more conversation regarding Code Status


Patient thinks he is in [facility].  Can not provide any information 

regarding the reason for hospitalization. 


Spoke with wife Tracy.  She requests DNR/DNI.  Understands without this attempt

he likely will die. 


Tracy sees that he is declining.  


Spoke with  regarding Code Status.  Informed of decline, questions and

inconsistency.    Orders given to Rupali [facility]  including DNR/DNI.





Spoke with Tracy.  Refuses to sign outside the hospital DNR/DNI form.  States 

daughter will not want this.











MARYURI JAMES                  May 17, 2019 09:44

## 2019-05-17 NOTE — PDOC
Infectious Disease Note


Subjective


Subjective


On Bipap but denies F/C/S/N/SOA. Better





ROS


ROS


o/w neg





Vital Sign


Vital Signs





Vital Signs








  Date Time  Temp Pulse Resp B/P (MAP) Pulse Ox O2 Delivery O2 Flow Rate FiO2


 


5/17/19 05:43     100 BiPAP/CPAP  


 


5/17/19 04:00 98.8 79 22 168/97 (120)   4.0 





 98.8       











Physical Exam


PHYSICAL EXAM


CONSTITUTIONAL:  He is alert, NAD. responds to questions.  He is on Bipap.. he 

is in a chair


HEENT:  Pupils are equal and reactive.  Normal conjunctivae.  


NECK:  Supple, no JVD.


LUNGS:  Clear without wheeze.


HEART:  S1, S2.


ABDOMEN:  Soft, nontender, no guarding.


GENITOURINARY:  Harmon is in place.


EXTREMITIES:  Without clubbing, cyanosis nor gross edema


SKIN:  Warm to touch without signs of rash.  He has tattoos.


NEUROLOGIC:  He is nonfocal, moves all extremities.  Affect is appropriate.





Labs


Micro





Microbiology


5/5/19 Blood Culture - Final, Complete


         NO GROWTH AFTER 5 DAYS


5/6/19 AFB Specimen Processing Tissue - Final, Resulted


         


5/6/19 Acid Fast Bacilli Culture, Resulted


         Pending


5/6/19 Gram Stain - Final, Resulted


         


5/6/19 Fungal Culture - Preliminary, Resulted


         


5/6/19 Fungal Culture Result 1 - Preliminary, Resulted





Objective


Assessment


Low grade temps resolved - core temps 100.6 T max. Had been on Azithromycin 5/5 

- 5/15 and Rocephin 5/6 - 5/15. Nml Procal


Leukocytosis - on steroids


Resp failure - improved


PCN allergy - doesn't remember reaction


COPD





Plan


Plan of Care





ID to sign off





D/w nursing











GURMEET OLSON MD              May 17, 2019 07:01

## 2019-05-17 NOTE — PDOC
PULMONARY PROGRESS NOTES


Subjective


EXTUBATED 5/15


DOING BETTER, ON CANULA


Vitals





Vital Signs








  Date Time  Temp Pulse Resp B/P (MAP) Pulse Ox O2 Delivery O2 Flow Rate FiO2


 


5/17/19 07:54     97 Nasal Cannula 3.0 


 


5/17/19 04:00 98.8 79 22 168/97 (120)    





 98.8       








General:  Alert, No acute distress


Lungs:  Other (decrease bs)


Cardiovascular:  S1, S2


Abdomen:  Soft


Extremities:  No Edema


Skin:  Warm


Labs





Laboratory Tests








Test


 5/16/19


05:20


 


White Blood Count


 12.5 x10^3/uL


(4.0-11.0)


 


Red Blood Count


 4.34 x10^6/uL


(4.30-5.70)


 


Hemoglobin


 12.1 g/dL


(13.0-17.5)


 


Hematocrit


 38.1 %


(39.0-53.0)


 


Mean Corpuscular Volume 88 fL () 


 


Mean Corpuscular Hemoglobin 28 pg (25-35) 


 


Mean Corpuscular Hemoglobin


Concent 32 g/dL


(31-37)


 


Red Cell Distribution Width


 18.1 %


(11.5-14.5)


 


Platelet Count


 167 x10^3/uL


(140-400)


 


Neutrophils (%) (Auto) 92 % (31-73) 


 


Lymphocytes (%) (Auto) 2 % (24-48) 


 


Monocytes (%) (Auto) 6 % (0-9) 


 


Eosinophils (%) (Auto) 0 % (0-3) 


 


Basophils (%) (Auto) 0 % (0-3) 


 


Neutrophils # (Auto)


 11.5 x10^3uL


(1.8-7.7)


 


Lymphocytes # (Auto)


 0.2 x10^3/uL


(1.0-4.8)


 


Monocytes # (Auto)


 0.7 x10^3/uL


(0.0-1.1)


 


Eosinophils # (Auto)


 0.0 x10^3/uL


(0.0-0.7)


 


Basophils # (Auto)


 0.0 x10^3/uL


(0.0-0.2)


 


Segmented Neutrophils % 95 % (35-66) 


 


Band Neutrophils % 1 % (0-9) 


 


Lymphocytes % 3 % (24-48) 


 


Eosinophils % 1 % (0-5) 


 


Platelet Estimate


 Adequate


(ADEQUATE)


 


Sodium Level


 152 mmol/L


(136-145)


 


Potassium Level


 3.8 mmol/L


(3.5-5.1)


 


Chloride Level


 112 mmol/L


()


 


Carbon Dioxide Level


 32 mmol/L


(21-32)


 


Anion Gap 8 (6-14) 


 


Blood Urea Nitrogen


 49 mg/dL


(8-26)


 


Creatinine


 0.7 mg/dL


(0.7-1.3)


 


Estimated GFR


(Cockcroft-Gault) 111.5 





 


Glucose Level


 150 mg/dL


(70-99)


 


Calcium Level


 8.5 mg/dL


(8.5-10.1)








Medications





Active Scripts








 Medications  Dose


 Route/Sig


 Max Daily Dose Days Date Category


 


 Prednisone 20 Mg


 Tablet  30 Mg


 PO DAILY


  5 3/6/19 Reported


 


 Levaquin


  (Levofloxacin)


 500 Mg Tablet  1 Tab


 PO DAILY


  10 3/6/19 Reported


 


 Ibuprofen 400 Mg


 Tablet  400 Mg


 PO PRN Q6HRS PRN


   2/9/18 Reported


 


 Duoneb 0.5-3(2.5)


 Mg/3 Ml


  (Albuterol/Ipratropium)


 3 Ml Ampul.neb  3 Ml


 NEB QID


   2/9/18 Reported


 


 Aspir-Low


  (Aspirin) 81 Mg


 Tablet.dr  1 Tab


 PO DAILY


   4/21/17 Reported


 


 Ventolin Hfa


 Inhaler


  (Albuterol


 Sulfate) 18 Gm


 Hfa.aer.ad  


 


   4/21/17 Reported


 


 Losartan-Hctz


 50-12.5 Mg Tab


  (Losartan/Hydrochlorothiazide)


 1 Each Tablet  1 Tab


 PO DAILY


   4/21/17 Reported


 


 Simvastatin 40 Mg


 Tablet  40 Mg


 PO HS


   6/4/15 Reported


 


 Clopidogrel


  (Clopidogrel


 Bisulfate) 75 Mg


 Tablet  75 Mg


 PO DAILY


   6/4/15 Reported








Comments


CXR 5/14


LLL infiltrate





Impression


.





IMPRESSION:


1.  Acute-on-chronic hypoxic and hypercapnic respiratory failure/ suspect severe

COPD, EXTUBATED 5/15


2.  Abnormal chest x-ray with bilateral interstitial infiltrates, now LLL 

infiltrate/ Fever, improving


3.  Possible a/c diastolic heart failure


4.  Abnormal D-dimer. clinical suspicion for PE/DVT low


5.  Azotemia.


6.  Septic shock pt with tachycardia lactic acidosis , resolved


7.  Abnormal ABG POA


8.  Protein malnutrition POA moderate to severe


9.  S/P BRONCH ON 5/6











VENOUS DOPPLER 5/6


IMPRESSION:   There is no sonographic evidence of deep vein thrombosis in 


either lower extremity. 





IMPRESSION: 5/6


1. No CT evidence of central pulmonary emboli.


2. Emphysema.


3. Worsening tree-in-bud and interstitial opacities predominantly in the 


lower chest with mild confluent infiltrate posteriorly in the right lower 


lobe. An inflammatory/infectious etiology is most likely.





Plan


.


EXTUBATED/ ON CANULA


BIPAP QHS


WILL CONTINUE SUPPORT


DIAGNOSTIC BRONCH SO FAR CULTURES NEGATIVE


ABG NOTED 


ANTIBX


SPEECH EVAL


PT CONSULT


DVT AND GI PROPH


BD


OFF ABX, NO FURTHER FEVER


D/W PT MULTIPLE TIMES ABOUT CODE STATUS. CHANGES DECISION MULTIPLE TIMES . I 

THINK HE IS STILL CONFUSED.ASK PALLIATIVE CARE TO DETERMINE GOALS OF CARE WITH 

FAMILY











ZULMA KNOWLES MD                 May 17, 2019 10:46

## 2019-05-18 VITALS — SYSTOLIC BLOOD PRESSURE: 191 MMHG | DIASTOLIC BLOOD PRESSURE: 94 MMHG

## 2019-05-18 VITALS — DIASTOLIC BLOOD PRESSURE: 87 MMHG | SYSTOLIC BLOOD PRESSURE: 153 MMHG

## 2019-05-18 VITALS — SYSTOLIC BLOOD PRESSURE: 161 MMHG | DIASTOLIC BLOOD PRESSURE: 93 MMHG

## 2019-05-18 VITALS — DIASTOLIC BLOOD PRESSURE: 79 MMHG | SYSTOLIC BLOOD PRESSURE: 161 MMHG

## 2019-05-18 VITALS — DIASTOLIC BLOOD PRESSURE: 94 MMHG | SYSTOLIC BLOOD PRESSURE: 161 MMHG

## 2019-05-18 VITALS — DIASTOLIC BLOOD PRESSURE: 93 MMHG | SYSTOLIC BLOOD PRESSURE: 153 MMHG

## 2019-05-18 VITALS — SYSTOLIC BLOOD PRESSURE: 188 MMHG | DIASTOLIC BLOOD PRESSURE: 93 MMHG

## 2019-05-18 PROCEDURE — 5A09357 ASSISTANCE WITH RESPIRATORY VENTILATION, LESS THAN 24 CONSECUTIVE HOURS, CONTINUOUS POSITIVE AIRWAY PRESSURE: ICD-10-PCS | Performed by: FAMILY MEDICINE

## 2019-05-18 RX ADMIN — METHYLPREDNISOLONE SODIUM SUCCINATE SCH MG: 40 INJECTION, POWDER, FOR SOLUTION INTRAMUSCULAR; INTRAVENOUS at 08:41

## 2019-05-18 RX ADMIN — ENOXAPARIN SODIUM SCH MG: 40 INJECTION SUBCUTANEOUS at 08:42

## 2019-05-18 RX ADMIN — IPRATROPIUM BROMIDE AND ALBUTEROL SULFATE SCH ML: .5; 3 SOLUTION RESPIRATORY (INHALATION) at 08:12

## 2019-05-18 RX ADMIN — IPRATROPIUM BROMIDE AND ALBUTEROL SULFATE SCH ML: .5; 3 SOLUTION RESPIRATORY (INHALATION) at 11:57

## 2019-05-18 RX ADMIN — ASPIRIN 81 MG SCH MG: 81 TABLET ORAL at 08:41

## 2019-05-18 RX ADMIN — IPRATROPIUM BROMIDE AND ALBUTEROL SULFATE SCH ML: .5; 3 SOLUTION RESPIRATORY (INHALATION) at 16:11

## 2019-05-18 RX ADMIN — KETOROLAC TROMETHAMINE PRN MG: 15 INJECTION, SOLUTION INTRAMUSCULAR; INTRAVENOUS at 15:39

## 2019-05-18 RX ADMIN — METHYLPREDNISOLONE SODIUM SUCCINATE SCH MG: 40 INJECTION, POWDER, FOR SOLUTION INTRAMUSCULAR; INTRAVENOUS at 18:13

## 2019-05-18 RX ADMIN — GLYCERIN, ISOLEUCINE, LEUCINE, LYSINE, METHIONINE, PHENYLALANINE, THREONINE, TRYPTOPHAN, VALINE, ALANINE, GLYCINE, ARGININE, HISTIDINE, PROLINE, SERINE, CYSTEINE, SODIUM ACETATE, MAGNESIUM ACETATE, CALCIUM ACETATE, SODIUM CHLORIDE, POTASSIUM CHLORIDE, PHOSPHORIC ACID, AND POTASSIUM METABISULFITE SCH MLS/HR
3; .21; .27; .22; .16; .17; .12; .046; .2; .21; .42; .29; .085; .34; .18; .014; .2; .054; .026; .12; .15; .041 INJECTION INTRAVENOUS at 21:57

## 2019-05-18 RX ADMIN — GLYCERIN, ISOLEUCINE, LEUCINE, LYSINE, METHIONINE, PHENYLALANINE, THREONINE, TRYPTOPHAN, VALINE, ALANINE, GLYCINE, ARGININE, HISTIDINE, PROLINE, SERINE, CYSTEINE, SODIUM ACETATE, MAGNESIUM ACETATE, CALCIUM ACETATE, SODIUM CHLORIDE, POTASSIUM CHLORIDE, PHOSPHORIC ACID, AND POTASSIUM METABISULFITE SCH MLS/HR
3; .21; .27; .22; .16; .17; .12; .046; .2; .21; .42; .29; .085; .34; .18; .014; .2; .054; .026; .12; .15; .041 INJECTION INTRAVENOUS at 07:28

## 2019-05-18 RX ADMIN — HYDRALAZINE HYDROCHLORIDE PRN MG: 20 INJECTION INTRAMUSCULAR; INTRAVENOUS at 12:10

## 2019-05-18 RX ADMIN — CLOPIDOGREL BISULFATE SCH MG: 75 TABLET ORAL at 09:00

## 2019-05-18 RX ADMIN — ASPIRIN 81 MG SCH MG: 81 TABLET ORAL at 08:00

## 2019-05-18 RX ADMIN — SIMVASTATIN SCH MG: 40 TABLET, FILM COATED ORAL at 21:00

## 2019-05-18 RX ADMIN — IPRATROPIUM BROMIDE AND ALBUTEROL SULFATE SCH ML: .5; 3 SOLUTION RESPIRATORY (INHALATION) at 20:00

## 2019-05-18 RX ADMIN — FAMOTIDINE SCH MG: 10 INJECTION, SOLUTION INTRAVENOUS at 08:42

## 2019-05-18 RX ADMIN — FAMOTIDINE SCH MG: 10 INJECTION, SOLUTION INTRAVENOUS at 21:57

## 2019-05-18 RX ADMIN — CLOPIDOGREL BISULFATE SCH MG: 75 TABLET ORAL at 08:41

## 2019-05-18 NOTE — PN
DATE:  05/18/2019



SUBJECTIVE:  The patient is awake, alert, sitting in bed, complaining about him

being n.p.o. status because of failed swallow test.  We discussed this a while

and he feels like he is willing to take a risk, although his wife says not so at

this point in time and I would agree to give him a few days further after

extubation and recheck swallow beginning of the week.  He is on TPN for the

same.



OBJECTIVE:

VITAL SIGNS:  Stable.  He is afebrile.  O2 is present at 4 liters per nasal

cannula after BiPAP overnight.

CHEST:  Reveals decreased breath sounds, but clear.  He did have a chest x-ray

this morning showing no acute cardiopulmonary findings.  The ET tube and feeding

tubes have been removed.



LABORATORY DATA:  Reviewed from yesterday with suggesting maybe a little

intravascularly dry with BUN of 47, creatinine 0.7 and sodium 150.  This will

need to be followed.  He is currently getting TPN at 80 mL an hour and I am

going to plan on increasing this up to 125 mL an hour.



IMPRESSION:

1.  Acute-on-chronic hypoxic and hypercarbic respiratory failure with

exacerbation of chronic obstructive pulmonary disease with interval intubation

with extubation on the 15th.

2.  Fever and interstitial infiltrates, improving.

3.  Probable mild dehydration, status post septic shock.

4.  Protein-calorie malnutrition.



PLAN:  Continue supportive care with a look towards another swallow evaluation

towards the first week, increase the TPN.  Follow his electrolytes.

 



______________________________

JOHNATHAN EAST MD



DR:  VARSHA/skylar  JOB#:  [id number] / [id number]

DD:  05/18/2019 09:50  DT:  05/18/2019 23:49

## 2019-05-18 NOTE — PDOC
PULMONARY PROGRESS NOTES


Subjective


EXTUBATED 5/15


on vm, sat 98%, refused bipap, sob better, has cough, weak


Vitals





Vital Signs








  Date Time  Temp Pulse Resp B/P (MAP) Pulse Ox O2 Delivery O2 Flow Rate FiO2


 


5/18/19 08:15     98  4.0 


 


5/18/19 07:00 97.3 62 22 188/93 (124)  BiPAP/CPAP  





 97.3       








General:  Alert, No acute distress


HEENT:  Other (nc at operrl)


Lungs:  Other (decrease bs)


Cardiovascular:  S1, S2


Abdomen:  Soft


Neuro Exam:  Alert


Extremities:  No Edema


Skin:  Warm


Labs





Laboratory Tests








Test


 5/17/19


10:19


 


Sodium Level


 150 mmol/L


(136-145)


 


Potassium Level


 4.6 mmol/L


(3.5-5.1)


 


Chloride Level


 114 mmol/L


()


 


Carbon Dioxide Level


 29 mmol/L


(21-32)


 


Anion Gap 7 (6-14) 


 


Blood Urea Nitrogen


 47 mg/dL


(8-26)


 


Creatinine


 0.7 mg/dL


(0.7-1.3)


 


Estimated GFR


(Cockcroft-Gault) 111.5 





 


Glucose Level


 145 mg/dL


(70-99)


 


Calcium Level


 8.0 mg/dL


(8.5-10.1)








Laboratory Tests








Test


 5/17/19


10:19


 


Sodium Level


 150 mmol/L


(136-145)


 


Potassium Level


 4.6 mmol/L


(3.5-5.1)


 


Chloride Level


 114 mmol/L


()


 


Carbon Dioxide Level


 29 mmol/L


(21-32)


 


Anion Gap 7 (6-14) 


 


Blood Urea Nitrogen


 47 mg/dL


(8-26)


 


Creatinine


 0.7 mg/dL


(0.7-1.3)


 


Estimated GFR


(Cockcroft-Gault) 111.5 





 


Glucose Level


 145 mg/dL


(70-99)


 


Calcium Level


 8.0 mg/dL


(8.5-10.1)








Medications





Active Scripts








 Medications  Dose


 Route/Sig


 Max Daily Dose Days Date Category


 


 Prednisone 20 Mg


 Tablet  30 Mg


 PO DAILY


  5 3/6/19 Reported


 


 Levaquin


  (Levofloxacin)


 500 Mg Tablet  1 Tab


 PO DAILY


  10 3/6/19 Reported


 


 Ibuprofen 400 Mg


 Tablet  400 Mg


 PO PRN Q6HRS PRN


   2/9/18 Reported


 


 Duoneb 0.5-3(2.5)


 Mg/3 Ml


  (Albuterol/Ipratropium)


 3 Ml Ampul.neb  3 Ml


 NEB QID


   2/9/18 Reported


 


 Aspir-Low


  (Aspirin) 81 Mg


 Tablet.dr  1 Tab


 PO DAILY


   4/21/17 Reported


 


 Ventolin Hfa


 Inhaler


  (Albuterol


 Sulfate) 18 Gm


 Hfa.aer.ad  


 


   4/21/17 Reported


 


 Losartan-Hctz


 50-12.5 Mg Tab


  (Losartan/Hydrochlorothiazide)


 1 Each Tablet  1 Tab


 PO DAILY


   4/21/17 Reported


 


 Simvastatin 40 Mg


 Tablet  40 Mg


 PO HS


   6/4/15 Reported


 


 Clopidogrel


  (Clopidogrel


 Bisulfate) 75 Mg


 Tablet  75 Mg


 PO DAILY


   6/4/15 Reported








Comments


CXR 5/18, no  infiltrate





Impression


.





IMPRESSION:


1.  Acute-on-chronic hypoxic and hypercapnic respiratory failure/ suspect severe

COPD, EXTUBATED 5/15


2.  Abnormal chest x-ray with bilateral interstitial infiltrates, improving


3.  Possible a/c diastolic heart failure


4.  Abnormal D-dimer. clinical suspicion for PE/DVT low


5.  Azotemia.


6.  Septic shock pt with tachycardia lactic acidosis , resolved


7.  Abnormal ABG POA


8.  Protein malnutrition POA moderate to severe


9.  S/P BRONCH ON 5/6











VENOUS DOPPLER 5/6


IMPRESSION:   There is no sonographic evidence of deep vein thrombosis in 


either lower extremity. 





IMPRESSION: 5/6


1. No CT evidence of central pulmonary emboli.


2. Emphysema.


3. Worsening tree-in-bud and interstitial opacities predominantly in the 


lower chest with mild confluent infiltrate posteriorly in the right lower 


lobe. An inflammatory/infectious etiology is most likely.





Plan


.


EXTUBATED/ titrate fio2 to keep sat 90%


BIPAP QHS


WILL CONTINUE SUPPORT


DIAGNOSTIC BRONCH SO FAR CULTURES NEGATIVE


ABG NOTED 


ANTIBX


SPEECH EVAL


PT OT


DVT AND GI PROPH


BD


OFF ABX, NO FURTHER FEVER


D/W PT MULTIPLE TIMES ABOUT CODE STATUS. CHANGES DECISION MULTIPLE TIMES . HE IS

STILL CONFUSED. PALLIATIVE CARE consulted TO DETERMINE GOALS OF CARE WITH FAMILY





discussed w rn, wife











DENNYS,TERESO GARCIA               May 18, 2019 08:35

## 2019-05-18 NOTE — RAD
EXAM: CHEST 1 VIEW 

 

History: On vent 

 

COMPARISON: 5/15/2019

 

TECHNIQUE: Single portable radiograph of the chest

 

FINDINGS:  Interval removal of ET tube, feeding tube. The lungs are clear 

bilaterally. The costophrenic sulci are clear and well demarcated.

 

IMPRESSION:  No acute cardiopulmonary findings.

 

 

Electronically signed by: Jag Delatorre MD (5/18/2019 8:12 AM) [facility]

## 2019-05-19 VITALS — SYSTOLIC BLOOD PRESSURE: 158 MMHG | DIASTOLIC BLOOD PRESSURE: 88 MMHG

## 2019-05-19 VITALS — SYSTOLIC BLOOD PRESSURE: 152 MMHG | DIASTOLIC BLOOD PRESSURE: 85 MMHG

## 2019-05-19 VITALS — SYSTOLIC BLOOD PRESSURE: 159 MMHG | DIASTOLIC BLOOD PRESSURE: 83 MMHG

## 2019-05-19 VITALS — SYSTOLIC BLOOD PRESSURE: 152 MMHG | DIASTOLIC BLOOD PRESSURE: 82 MMHG

## 2019-05-19 VITALS — DIASTOLIC BLOOD PRESSURE: 75 MMHG | SYSTOLIC BLOOD PRESSURE: 168 MMHG

## 2019-05-19 VITALS — SYSTOLIC BLOOD PRESSURE: 170 MMHG | DIASTOLIC BLOOD PRESSURE: 70 MMHG

## 2019-05-19 LAB
ANION GAP SERPL CALC-SCNC: 9 MMOL/L (ref 6–14)
BASE EXCESS ABG: -3 MMOL/L (ref -3–3)
BUN SERPL-MCNC: 42 MG/DL (ref 8–26)
CALCIUM SERPL-MCNC: 8.3 MG/DL (ref 8.5–10.1)
CHLORIDE SERPL-SCNC: 106 MMOL/L (ref 98–107)
CO2 SERPL-SCNC: 17 MMOL/L (ref 21–32)
CREAT SERPL-MCNC: 0.5 MG/DL (ref 0.7–1.3)
GFR SERPLBLD BASED ON 1.73 SQ M-ARVRAT: 164.4 ML/MIN
GLUCOSE SERPL-MCNC: 120 MG/DL (ref 70–99)
HCO3 BLDA-SCNC: 20 MMOL/L (ref 21–28)
INSPIRATION SETTING TIME VENT: 28
PCO2 BLDA: 29 MMHG (ref 35–46)
PO2 BLDA: 70 MMHG (ref 65–108)
POTASSIUM SERPL-SCNC: 4.8 MMOL/L (ref 3.5–5.1)
SAO2 % BLDA: 93 % (ref 92–99)
SODIUM SERPL-SCNC: 132 MMOL/L (ref 136–145)

## 2019-05-19 RX ADMIN — IPRATROPIUM BROMIDE AND ALBUTEROL SULFATE SCH ML: .5; 3 SOLUTION RESPIRATORY (INHALATION) at 07:15

## 2019-05-19 RX ADMIN — GLYCERIN, ISOLEUCINE, LEUCINE, LYSINE, METHIONINE, PHENYLALANINE, THREONINE, TRYPTOPHAN, VALINE, ALANINE, GLYCINE, ARGININE, HISTIDINE, PROLINE, SERINE, CYSTEINE, SODIUM ACETATE, MAGNESIUM ACETATE, CALCIUM ACETATE, SODIUM CHLORIDE, POTASSIUM CHLORIDE, PHOSPHORIC ACID, AND POTASSIUM METABISULFITE SCH MLS/HR
3; .21; .27; .22; .16; .17; .12; .046; .2; .21; .42; .29; .085; .34; .18; .014; .2; .054; .026; .12; .15; .041 INJECTION INTRAVENOUS at 20:24

## 2019-05-19 RX ADMIN — ENOXAPARIN SODIUM SCH MG: 40 INJECTION SUBCUTANEOUS at 08:59

## 2019-05-19 RX ADMIN — IPRATROPIUM BROMIDE AND ALBUTEROL SULFATE SCH ML: .5; 3 SOLUTION RESPIRATORY (INHALATION) at 19:58

## 2019-05-19 RX ADMIN — CLOPIDOGREL BISULFATE SCH MG: 75 TABLET ORAL at 07:53

## 2019-05-19 RX ADMIN — FAMOTIDINE SCH MG: 10 INJECTION, SOLUTION INTRAVENOUS at 09:00

## 2019-05-19 RX ADMIN — IPRATROPIUM BROMIDE AND ALBUTEROL SULFATE SCH ML: .5; 3 SOLUTION RESPIRATORY (INHALATION) at 15:39

## 2019-05-19 RX ADMIN — FAMOTIDINE SCH MG: 10 INJECTION, SOLUTION INTRAVENOUS at 20:24

## 2019-05-19 RX ADMIN — GLYCERIN, ISOLEUCINE, LEUCINE, LYSINE, METHIONINE, PHENYLALANINE, THREONINE, TRYPTOPHAN, VALINE, ALANINE, GLYCINE, ARGININE, HISTIDINE, PROLINE, SERINE, CYSTEINE, SODIUM ACETATE, MAGNESIUM ACETATE, CALCIUM ACETATE, SODIUM CHLORIDE, POTASSIUM CHLORIDE, PHOSPHORIC ACID, AND POTASSIUM METABISULFITE SCH MLS/HR
3; .21; .27; .22; .16; .17; .12; .046; .2; .21; .42; .29; .085; .34; .18; .014; .2; .054; .026; .12; .15; .041 INJECTION INTRAVENOUS at 13:30

## 2019-05-19 RX ADMIN — GLYCERIN, ISOLEUCINE, LEUCINE, LYSINE, METHIONINE, PHENYLALANINE, THREONINE, TRYPTOPHAN, VALINE, ALANINE, GLYCINE, ARGININE, HISTIDINE, PROLINE, SERINE, CYSTEINE, SODIUM ACETATE, MAGNESIUM ACETATE, CALCIUM ACETATE, SODIUM CHLORIDE, POTASSIUM CHLORIDE, PHOSPHORIC ACID, AND POTASSIUM METABISULFITE SCH MLS/HR
3; .21; .27; .22; .16; .17; .12; .046; .2; .21; .42; .29; .085; .34; .18; .014; .2; .054; .026; .12; .15; .041 INJECTION INTRAVENOUS at 11:53

## 2019-05-19 RX ADMIN — METHYLPREDNISOLONE SODIUM SUCCINATE SCH MG: 40 INJECTION, POWDER, FOR SOLUTION INTRAMUSCULAR; INTRAVENOUS at 18:31

## 2019-05-19 RX ADMIN — SIMVASTATIN SCH MG: 40 TABLET, FILM COATED ORAL at 20:13

## 2019-05-19 RX ADMIN — METHYLPREDNISOLONE SODIUM SUCCINATE SCH MG: 40 INJECTION, POWDER, FOR SOLUTION INTRAMUSCULAR; INTRAVENOUS at 09:00

## 2019-05-19 RX ADMIN — IPRATROPIUM BROMIDE AND ALBUTEROL SULFATE SCH ML: .5; 3 SOLUTION RESPIRATORY (INHALATION) at 12:26

## 2019-05-19 RX ADMIN — ASPIRIN 81 MG SCH MG: 81 TABLET ORAL at 07:52

## 2019-05-19 RX ADMIN — HYDRALAZINE HYDROCHLORIDE PRN MG: 20 INJECTION INTRAMUSCULAR; INTRAVENOUS at 09:24

## 2019-05-19 NOTE — NUR
pt refusing to get in bed and unable to stand long enough to get standing weight.

-------------------------------------------------------------------------------

Addendum: 05/19/19 at 0620 by ROGELIO JAVIER RN

-------------------------------------------------------------------------------

Amended: Links added.

## 2019-05-19 NOTE — PDOC
PULMONARY PROGRESS NOTES


Subjective


EXTUBATED 5/15


on nc, sob better,no cough, weak, npo


Vitals





Vital Signs








  Date Time  Temp Pulse Resp B/P (MAP) Pulse Ox O2 Delivery O2 Flow Rate FiO2


 


5/19/19 07:17     99 Nasal Cannula 3.0 


 


5/19/19 06:55 97.8 58 20 168/75 (106)    





 97.8       








ROS:  No Nausea


General:  Alert, No acute distress


HEENT:  Other (nc at operrl)


Lungs:  Other (decrease bs)


Cardiovascular:  S1, S2


Abdomen:  Soft, Non-tender


Neuro Exam:  Alert, Oriented


Extremities:  No Edema


Skin:  Warm


Labs





Laboratory Tests








Test


 5/17/19


10:19


 


Sodium Level


 150 mmol/L


(136-145)


 


Potassium Level


 4.6 mmol/L


(3.5-5.1)


 


Chloride Level


 114 mmol/L


()


 


Carbon Dioxide Level


 29 mmol/L


(21-32)


 


Anion Gap 7 (6-14) 


 


Blood Urea Nitrogen


 47 mg/dL


(8-26)


 


Creatinine


 0.7 mg/dL


(0.7-1.3)


 


Estimated GFR


(Cockcroft-Gault) 111.5 





 


Glucose Level


 145 mg/dL


(70-99)


 


Calcium Level


 8.0 mg/dL


(8.5-10.1)








Medications





Active Scripts








 Medications  Dose


 Route/Sig


 Max Daily Dose Days Date Category


 


 Prednisone 20 Mg


 Tablet  30 Mg


 PO DAILY


  5 3/6/19 Reported


 


 Levaquin


  (Levofloxacin)


 500 Mg Tablet  1 Tab


 PO DAILY


  10 3/6/19 Reported


 


 Ibuprofen 400 Mg


 Tablet  400 Mg


 PO PRN Q6HRS PRN


   2/9/18 Reported


 


 Duoneb 0.5-3(2.5)


 Mg/3 Ml


  (Albuterol/Ipratropium)


 3 Ml Ampul.neb  3 Ml


 NEB QID


   2/9/18 Reported


 


 Aspir-Low


  (Aspirin) 81 Mg


 Tablet.dr  1 Tab


 PO DAILY


   4/21/17 Reported


 


 Ventolin Hfa


 Inhaler


  (Albuterol


 Sulfate) 18 Gm


 Hfa.aer.ad  


 


   4/21/17 Reported


 


 Losartan-Hctz


 50-12.5 Mg Tab


  (Losartan/Hydrochlorothiazide)


 1 Each Tablet  1 Tab


 PO DAILY


   4/21/17 Reported


 


 Simvastatin 40 Mg


 Tablet  40 Mg


 PO HS


   6/4/15 Reported


 


 Clopidogrel


  (Clopidogrel


 Bisulfate) 75 Mg


 Tablet  75 Mg


 PO DAILY


   6/4/15 Reported








Comments


CXR 5/18, no  infiltrate





Impression


.





IMPRESSION:


1.  Acute-on-chronic hypoxic and hypercapnic respiratory failure/ suspect severe

COPD, EXTUBATED 5/15


2.  Abnormal chest x-ray with bilateral interstitial infiltrates, improving


3.  Possible a/c diastolic heart failure


4.  Abnormal D-dimer. clinical suspicion for PE/DVT low


5.  Azotemia.


6.  Septic shock pt with tachycardia lactic acidosis , resolved


7.   dysphagia


8.  Protein malnutrition POA moderate to severe


9.  S/P BRONCH ON 5/6














VENOUS DOPPLER 5/6


IMPRESSION:   There is no sonographic evidence of deep vein thrombosis in 


either lower extremity. 





IMPRESSION: 5/6


1. No CT evidence of central pulmonary emboli.


2. Emphysema.


3. Worsening tree-in-bud and interstitial opacities predominantly in the 


lower chest with mild confluent infiltrate posteriorly in the right lower 


lobe. An inflammatory/infectious etiology is most likely.





Plan


.


EXTUBATED/ titrate fio2 to keep sat 90%


BIPAP QHS, refusing, the importance of use discussed


WILL CONTINUE SUPPORT


DIAGNOSTIC BRONCH SO FAR CULTURES NEGATIVE 


ANTIBX


SPEECH EVAL, dysphagia, npo


PT OT


DVT AND GI PROPH


BD


OFF ABX, NO FURTHER FEVER


D/W PT MULTIPLE TIMES ABOUT CODE STATUS. CHANGES DECISION MULTIPLE TIMES . HE IS

STILL CONFUSED. PALLIATIVE CARE consulted TO DETERMINE GOALS OF CARE WITH FAMILY





discussed w rn, pt











TERESO NOYOLA MD               May 19, 2019 08:43

## 2019-05-20 VITALS — DIASTOLIC BLOOD PRESSURE: 66 MMHG | SYSTOLIC BLOOD PRESSURE: 151 MMHG

## 2019-05-20 VITALS — DIASTOLIC BLOOD PRESSURE: 80 MMHG | SYSTOLIC BLOOD PRESSURE: 144 MMHG

## 2019-05-20 VITALS — SYSTOLIC BLOOD PRESSURE: 136 MMHG | DIASTOLIC BLOOD PRESSURE: 88 MMHG

## 2019-05-20 VITALS — DIASTOLIC BLOOD PRESSURE: 62 MMHG | SYSTOLIC BLOOD PRESSURE: 128 MMHG

## 2019-05-20 VITALS — DIASTOLIC BLOOD PRESSURE: 67 MMHG | SYSTOLIC BLOOD PRESSURE: 159 MMHG

## 2019-05-20 VITALS — SYSTOLIC BLOOD PRESSURE: 151 MMHG | DIASTOLIC BLOOD PRESSURE: 68 MMHG

## 2019-05-20 RX ADMIN — METHYLPREDNISOLONE SODIUM SUCCINATE SCH MG: 40 INJECTION, POWDER, FOR SOLUTION INTRAMUSCULAR; INTRAVENOUS at 07:00

## 2019-05-20 RX ADMIN — IPRATROPIUM BROMIDE AND ALBUTEROL SULFATE SCH ML: .5; 3 SOLUTION RESPIRATORY (INHALATION) at 20:10

## 2019-05-20 RX ADMIN — FAMOTIDINE SCH MG: 10 INJECTION, SOLUTION INTRAVENOUS at 22:03

## 2019-05-20 RX ADMIN — ASPIRIN 81 MG SCH MG: 81 TABLET ORAL at 08:00

## 2019-05-20 RX ADMIN — IPRATROPIUM BROMIDE AND ALBUTEROL SULFATE SCH ML: .5; 3 SOLUTION RESPIRATORY (INHALATION) at 11:27

## 2019-05-20 RX ADMIN — CLOPIDOGREL BISULFATE SCH MG: 75 TABLET ORAL at 08:34

## 2019-05-20 RX ADMIN — FUROSEMIDE SCH MG: 10 INJECTION, SOLUTION INTRAMUSCULAR; INTRAVENOUS at 08:40

## 2019-05-20 RX ADMIN — IPRATROPIUM BROMIDE AND ALBUTEROL SULFATE SCH ML: .5; 3 SOLUTION RESPIRATORY (INHALATION) at 15:36

## 2019-05-20 RX ADMIN — GLYCERIN, ISOLEUCINE, LEUCINE, LYSINE, METHIONINE, PHENYLALANINE, THREONINE, TRYPTOPHAN, VALINE, ALANINE, GLYCINE, ARGININE, HISTIDINE, PROLINE, SERINE, CYSTEINE, SODIUM ACETATE, MAGNESIUM ACETATE, CALCIUM ACETATE, SODIUM CHLORIDE, POTASSIUM CHLORIDE, PHOSPHORIC ACID, AND POTASSIUM METABISULFITE SCH MLS/HR
3; .21; .27; .22; .16; .17; .12; .046; .2; .21; .42; .29; .085; .34; .18; .014; .2; .054; .026; .12; .15; .041 INJECTION INTRAVENOUS at 05:18

## 2019-05-20 RX ADMIN — FUROSEMIDE SCH MG: 10 INJECTION, SOLUTION INTRAMUSCULAR; INTRAVENOUS at 13:49

## 2019-05-20 RX ADMIN — IPRATROPIUM BROMIDE AND ALBUTEROL SULFATE SCH ML: .5; 3 SOLUTION RESPIRATORY (INHALATION) at 07:43

## 2019-05-20 RX ADMIN — ENOXAPARIN SODIUM SCH MG: 40 INJECTION SUBCUTANEOUS at 08:41

## 2019-05-20 RX ADMIN — GLYCERIN, ISOLEUCINE, LEUCINE, LYSINE, METHIONINE, PHENYLALANINE, THREONINE, TRYPTOPHAN, VALINE, ALANINE, GLYCINE, ARGININE, HISTIDINE, PROLINE, SERINE, CYSTEINE, SODIUM ACETATE, MAGNESIUM ACETATE, CALCIUM ACETATE, SODIUM CHLORIDE, POTASSIUM CHLORIDE, PHOSPHORIC ACID, AND POTASSIUM METABISULFITE SCH MLS/HR
3; .21; .27; .22; .16; .17; .12; .046; .2; .21; .42; .29; .085; .34; .18; .014; .2; .054; .026; .12; .15; .041 INJECTION INTRAVENOUS at 13:49

## 2019-05-20 RX ADMIN — FAMOTIDINE SCH MG: 10 INJECTION, SOLUTION INTRAVENOUS at 08:41

## 2019-05-20 RX ADMIN — GLYCERIN, ISOLEUCINE, LEUCINE, LYSINE, METHIONINE, PHENYLALANINE, THREONINE, TRYPTOPHAN, VALINE, ALANINE, GLYCINE, ARGININE, HISTIDINE, PROLINE, SERINE, CYSTEINE, SODIUM ACETATE, MAGNESIUM ACETATE, CALCIUM ACETATE, SODIUM CHLORIDE, POTASSIUM CHLORIDE, PHOSPHORIC ACID, AND POTASSIUM METABISULFITE SCH MLS/HR
3; .21; .27; .22; .16; .17; .12; .046; .2; .21; .42; .29; .085; .34; .18; .014; .2; .054; .026; .12; .15; .041 INJECTION INTRAVENOUS at 22:04

## 2019-05-20 RX ADMIN — SIMVASTATIN SCH MG: 40 TABLET, FILM COATED ORAL at 19:36

## 2019-05-20 RX ADMIN — KETOROLAC TROMETHAMINE PRN MG: 15 INJECTION, SOLUTION INTRAMUSCULAR; INTRAVENOUS at 13:50

## 2019-05-20 RX ADMIN — METHYLPREDNISOLONE SODIUM SUCCINATE SCH MG: 40 INJECTION, POWDER, FOR SOLUTION INTRAMUSCULAR; INTRAVENOUS at 08:40

## 2019-05-20 NOTE — NUR
SW following. SW faxed updates to [facility]. Pt failed a swallow study again. Will continue to 
follow.

## 2019-05-20 NOTE — PDOC2
PALLIATIVE CARE


Palliative Care Note


Palliative Care


Patient opens eyes to verbal stimuli.  Attempts to answer questions.  Very weak 

voice.


No family at bedside. 


Remains on 3 antibiotics.  Awaiting sensitivity results. 


Will discuss options with family. Wife said in meeting last week  she wants to 

take his home with hospice after patient has been treated with antibiotics. 


Patient has had PEG in past.Will review goals, risks/benefits of treatments.    





Note Wrong Patient











MARYURI JAMES                  May 20, 2019 13:36

## 2019-05-20 NOTE — PDOC2
PALLIATIVE CARE


Palliative Care Note


Palliative Care


Patient sitting up in chair.  Denies pain/SOB.  "Better day."


Swallow evaluation noted. 


Discussed options of feeding with Dr. Forman.


No family at bedside. 


Code Status:  DNR/DNI.   Patient and wife confirmed with Dr. Carmona this am. 


PC will sign off.











MARYURI JAMES                  May 20, 2019 14:13

## 2019-05-20 NOTE — PN
DATE:  05/19/2019



LOCATION OF THE PATIENT:  Room 648. 



SUBJECTIVE:  The patient is much more somnolent and hard to awake, although does

respond appropriately on questioning at least some of the questions.  Wife is in

attendance states that he is refusing to wear his BiPAP and set up all night in

a chair to sleep.  She is more worried about him today because of the decreased

mental status in addition.  She is also very upset about DNR and being feels she

is being pushed do the same.



OBJECTIVE:

VITAL SIGNS:  Stable.  He is afebrile.  O2 sats are good on 3 liters per nasal

cannula.  Again, much less awake and alert today.

CHEST:  Reveals decreased breath sounds.

HEART:  Regular.

ABDOMEN:  Benign.

EXTREMITIES:  Unremarkable.



IMPRESSION:

1.  Worsening mental status and I would worry at this point about possible

carbon dioxide retention and we will check a blood gas in addition as that was a

problem earlier in the admission with his respiratory failure.

2.  Exacerbation of pneumonia.

3.  Chronic obstructive pulmonary disease.

4.  Dysphagia.



PLAN:  Check a blood gas to make sure we do not have a problem there. 

Encouraged use of the BiPAP, otherwise same therapy.  We will recheck a BMP with

the increased dose of PPN and feeling yesterday lab wise that he was somewhat

dehydrated.

 



______________________________

JOHNATHAN EAST MD



DR:  VARSHA/skylar  JOB#:  [id number] / [id number]

DD:  05/19/2019 11:35  DT:  05/20/2019 00:01

## 2019-05-20 NOTE — PDOC
PULMONARY PROGRESS NOTES


Subjective


EXTUBATED 5/15


NOT MORE SOA WANTS TO EAT


Vitals





Vital Signs








  Date Time  Temp Pulse Resp B/P (MAP) Pulse Ox O2 Delivery O2 Flow Rate FiO2


 


5/20/19 08:00 98.2 58 20 159/67 (97) 95 Nasal Cannula 3.0 





 98.2       








ROS:  No Nausea, No Chest Pain, No Abdominal Pain, No Increase Cough


General:  Alert, No acute distress


HEENT:  Other


Lungs:  Other (decrease bs)


Cardiovascular:  S1, S2


Abdomen:  Soft, Non-tender


Neuro Exam:  Alert, Oriented


Extremities:  No Edema


Skin:  Warm


Labs





Laboratory Tests








Test


 5/19/19


12:30


 


O2 Saturation 93 % (92-99) 


 


Arterial Blood pH


 7.45


(7.35-7.45)


 


Arterial Blood pCO2 at


Patient Temp 29 mmHg


(35-46)


 


Arterial Blood pO2 at Patient


Temp 70 mmHg


()


 


Arterial Blood HCO3


 20 mmol/L


(21-28)


 


Arterial Blood Base Excess


 -3 mmol/L


(-3-3)


 


FiO2 28 


 


Sodium Level


 132 mmol/L


(136-145)


 


Potassium Level


 4.8 mmol/L


(3.5-5.1)


 


Chloride Level


 106 mmol/L


()


 


Carbon Dioxide Level


 17 mmol/L


(21-32)


 


Anion Gap 9 (6-14) 


 


Blood Urea Nitrogen


 42 mg/dL


(8-26)


 


Creatinine


 0.5 mg/dL


(0.7-1.3)


 


Estimated GFR


(Cockcroft-Gault) 164.4 





 


Glucose Level


 120 mg/dL


(70-99)


 


Calcium Level


 8.3 mg/dL


(8.5-10.1)








Laboratory Tests








Test


 5/19/19


12:30


 


O2 Saturation 93 % (92-99) 


 


Arterial Blood pH


 7.45


(7.35-7.45)


 


Arterial Blood pCO2 at


Patient Temp 29 mmHg


(35-46)


 


Arterial Blood pO2 at Patient


Temp 70 mmHg


()


 


Arterial Blood HCO3


 20 mmol/L


(21-28)


 


Arterial Blood Base Excess


 -3 mmol/L


(-3-3)


 


FiO2 28 


 


Sodium Level


 132 mmol/L


(136-145)


 


Potassium Level


 4.8 mmol/L


(3.5-5.1)


 


Chloride Level


 106 mmol/L


()


 


Carbon Dioxide Level


 17 mmol/L


(21-32)


 


Anion Gap 9 (6-14) 


 


Blood Urea Nitrogen


 42 mg/dL


(8-26)


 


Creatinine


 0.5 mg/dL


(0.7-1.3)


 


Estimated GFR


(Cockcroft-Gault) 164.4 





 


Glucose Level


 120 mg/dL


(70-99)


 


Calcium Level


 8.3 mg/dL


(8.5-10.1)








Medications





Active Scripts








 Medications  Dose


 Route/Sig


 Max Daily Dose Days Date Category


 


 Prednisone 20 Mg


 Tablet  30 Mg


 PO DAILY


  5 3/6/19 Reported


 


 Levaquin


  (Levofloxacin)


 500 Mg Tablet  1 Tab


 PO DAILY


  10 3/6/19 Reported


 


 Ibuprofen 400 Mg


 Tablet  400 Mg


 PO PRN Q6HRS PRN


   2/9/18 Reported


 


 Duoneb 0.5-3(2.5)


 Mg/3 Ml


  (Albuterol/Ipratropium)


 3 Ml Ampul.neb  3 Ml


 NEB QID


   2/9/18 Reported


 


 Aspir-Low


  (Aspirin) 81 Mg


 Tablet.dr  1 Tab


 PO DAILY


   4/21/17 Reported


 


 Ventolin Hfa


 Inhaler


  (Albuterol


 Sulfate) 18 Gm


 Hfa.aer.ad  


 


   4/21/17 Reported


 


 Losartan-Hctz


 50-12.5 Mg Tab


  (Losartan/Hydrochlorothiazide)


 1 Each Tablet  1 Tab


 PO DAILY


   4/21/17 Reported


 


 Simvastatin 40 Mg


 Tablet  40 Mg


 PO HS


   6/4/15 Reported


 


 Clopidogrel


  (Clopidogrel


 Bisulfate) 75 Mg


 Tablet  75 Mg


 PO DAILY


   6/4/15 Reported











Impression


.





IMPRESSION:


1.  Acute-on-chronic hypoxic and hypercapnic respiratory failure/ suspect severe

COPD, EXTUBATED 5/15


2.  Abnormal chest x-ray with bilateral interstitial infiltrates, improving


3.  Possible a/c diastolic heart failure


4.  Abnormal D-dimer. clinical suspicion for PE/DVT low


5.  Azotemia.


6.  Septic shock pt with tachycardia lactic acidosis , resolved


7.   dysphagia


8.  Protein malnutrition POA moderate to severe


9.  S/P BRONCH ON 5/6














VENOUS DOPPLER 5/6


IMPRESSION:   There is no sonographic evidence of deep vein thrombosis in 


either lower extremity. 





IMPRESSION: 5/6


1. No CT evidence of central pulmonary emboli.


2. Emphysema.


3. Worsening tree-in-bud and interstitial opacities predominantly in the 


lower chest with mild confluent infiltrate posteriorly in the right lower 


lobe. An inflammatory/infectious etiology is most likely.





Plan


.


WILL INSERT DOBBHOFF IN AM


WILL NEED REHAB





DECLINED BIPAP QHS


WILL CONTINUE SUPPORT


DIAGNOSTIC BRONCH SO FAR CULTURES NEGATIVE 


ANTIBX


SPEECH EVAL, NPO FOR NOW


PT OT


DVT AND GI PROPH


BD


OFF ABX, NO FURTHER FEVER


D/W PAT PALLIATIVE CARE











ERNESTINA MIGUEL MD              May 20, 2019 08:56

## 2019-05-20 NOTE — PDOC
Provider Note


Provider Note


vss, more edema arms>legs ,w et cough but clear cxr - will reduce steroids re 

edema, add iv lasix, still npo- d/w he and wife, he still declines vent/trach, 

remains DNR- speech re swallow GUS Kern MD             May 20, 2019 08:07

## 2019-05-21 VITALS — DIASTOLIC BLOOD PRESSURE: 85 MMHG | SYSTOLIC BLOOD PRESSURE: 138 MMHG

## 2019-05-21 VITALS — DIASTOLIC BLOOD PRESSURE: 50 MMHG | SYSTOLIC BLOOD PRESSURE: 124 MMHG

## 2019-05-21 VITALS — SYSTOLIC BLOOD PRESSURE: 124 MMHG | DIASTOLIC BLOOD PRESSURE: 79 MMHG

## 2019-05-21 VITALS — SYSTOLIC BLOOD PRESSURE: 134 MMHG | DIASTOLIC BLOOD PRESSURE: 79 MMHG

## 2019-05-21 VITALS — DIASTOLIC BLOOD PRESSURE: 83 MMHG | SYSTOLIC BLOOD PRESSURE: 159 MMHG

## 2019-05-21 VITALS — SYSTOLIC BLOOD PRESSURE: 167 MMHG | DIASTOLIC BLOOD PRESSURE: 75 MMHG

## 2019-05-21 VITALS — DIASTOLIC BLOOD PRESSURE: 66 MMHG | SYSTOLIC BLOOD PRESSURE: 116 MMHG

## 2019-05-21 RX ADMIN — ASPIRIN 81 MG SCH MG: 81 TABLET ORAL at 08:00

## 2019-05-21 RX ADMIN — FUROSEMIDE SCH MG: 10 INJECTION, SOLUTION INTRAMUSCULAR; INTRAVENOUS at 15:10

## 2019-05-21 RX ADMIN — IPRATROPIUM BROMIDE AND ALBUTEROL SULFATE SCH ML: .5; 3 SOLUTION RESPIRATORY (INHALATION) at 11:14

## 2019-05-21 RX ADMIN — NICOTINE SCH PATCH: 14 PATCH, EXTENDED RELEASE TOPICAL at 09:00

## 2019-05-21 RX ADMIN — IPRATROPIUM BROMIDE AND ALBUTEROL SULFATE SCH ML: .5; 3 SOLUTION RESPIRATORY (INHALATION) at 07:18

## 2019-05-21 RX ADMIN — GLYCERIN, ISOLEUCINE, LEUCINE, LYSINE, METHIONINE, PHENYLALANINE, THREONINE, TRYPTOPHAN, VALINE, ALANINE, GLYCINE, ARGININE, HISTIDINE, PROLINE, SERINE, CYSTEINE, SODIUM ACETATE, MAGNESIUM ACETATE, CALCIUM ACETATE, SODIUM CHLORIDE, POTASSIUM CHLORIDE, PHOSPHORIC ACID, AND POTASSIUM METABISULFITE SCH MLS/HR
3; .21; .27; .22; .16; .17; .12; .046; .2; .21; .42; .29; .085; .34; .18; .014; .2; .054; .026; .12; .15; .041 INJECTION INTRAVENOUS at 09:39

## 2019-05-21 RX ADMIN — SIMVASTATIN SCH MG: 40 TABLET, FILM COATED ORAL at 20:25

## 2019-05-21 RX ADMIN — FUROSEMIDE SCH MG: 10 INJECTION, SOLUTION INTRAMUSCULAR; INTRAVENOUS at 09:49

## 2019-05-21 RX ADMIN — HALOPERIDOL LACTATE PRN MG: 5 INJECTION, SOLUTION INTRAMUSCULAR at 23:46

## 2019-05-21 RX ADMIN — ENOXAPARIN SODIUM SCH MG: 40 INJECTION SUBCUTANEOUS at 09:50

## 2019-05-21 RX ADMIN — IPRATROPIUM BROMIDE AND ALBUTEROL SULFATE SCH ML: .5; 3 SOLUTION RESPIRATORY (INHALATION) at 19:24

## 2019-05-21 RX ADMIN — CLOPIDOGREL BISULFATE SCH MG: 75 TABLET ORAL at 09:00

## 2019-05-21 RX ADMIN — GLYCERIN, ISOLEUCINE, LEUCINE, LYSINE, METHIONINE, PHENYLALANINE, THREONINE, TRYPTOPHAN, VALINE, ALANINE, GLYCINE, ARGININE, HISTIDINE, PROLINE, SERINE, CYSTEINE, SODIUM ACETATE, MAGNESIUM ACETATE, CALCIUM ACETATE, SODIUM CHLORIDE, POTASSIUM CHLORIDE, PHOSPHORIC ACID, AND POTASSIUM METABISULFITE SCH MLS/HR
3; .21; .27; .22; .16; .17; .12; .046; .2; .21; .42; .29; .085; .34; .18; .014; .2; .054; .026; .12; .15; .041 INJECTION INTRAVENOUS at 21:30

## 2019-05-21 RX ADMIN — FAMOTIDINE SCH MG: 10 INJECTION, SOLUTION INTRAVENOUS at 09:00

## 2019-05-21 RX ADMIN — FAMOTIDINE SCH MG: 10 INJECTION, SOLUTION INTRAVENOUS at 22:20

## 2019-05-21 RX ADMIN — METHYLPREDNISOLONE SODIUM SUCCINATE SCH MG: 40 INJECTION, POWDER, FOR SOLUTION INTRAMUSCULAR; INTRAVENOUS at 05:24

## 2019-05-21 RX ADMIN — GLYCERIN, ISOLEUCINE, LEUCINE, LYSINE, METHIONINE, PHENYLALANINE, THREONINE, TRYPTOPHAN, VALINE, ALANINE, GLYCINE, ARGININE, HISTIDINE, PROLINE, SERINE, CYSTEINE, SODIUM ACETATE, MAGNESIUM ACETATE, CALCIUM ACETATE, SODIUM CHLORIDE, POTASSIUM CHLORIDE, PHOSPHORIC ACID, AND POTASSIUM METABISULFITE SCH MLS/HR
3; .21; .27; .22; .16; .17; .12; .046; .2; .21; .42; .29; .085; .34; .18; .014; .2; .054; .026; .12; .15; .041 INJECTION INTRAVENOUS at 18:04

## 2019-05-21 RX ADMIN — IPRATROPIUM BROMIDE AND ALBUTEROL SULFATE SCH ML: .5; 3 SOLUTION RESPIRATORY (INHALATION) at 15:55

## 2019-05-21 NOTE — NUR
Dr. Carmona notified of positive homans sign to LLE and slight swelling. No new orders at 
this time, continue to monitor area for worsening and Dr. Carmona will examine 5/22/19.

## 2019-05-21 NOTE — PDOC
Provider Note


Provider Note


vss, 5    L output re lasix, edema less- still npo on procalamine, steroid 

reduced- cont laisix , follow K+- NEEDS TO BE IN HOSPITAL











GUS CHERY MD             May 21, 2019 07:57

## 2019-05-21 NOTE — PDOC
PULMONARY PROGRESS NOTES


Subjective


EXTUBATED 5/15


NOT MORE SOA WANTS TO EAT


Vitals





Vital Signs








  Date Time  Temp Pulse Resp B/P (MAP) Pulse Ox O2 Delivery O2 Flow Rate FiO2


 


5/21/19 07:58 97.6 78 18 167/75 (105) 94 Nasal Cannula 2.5 





 97.6       








ROS:  No Nausea, No Chest Pain, No Abdominal Pain, No Increase Cough


General:  Alert, No acute distress


HEENT:  Other


Lungs:  Other (decrease bs)


Cardiovascular:  S1, S2


Abdomen:  Soft, Non-tender


Neuro Exam:  Alert, Oriented


Extremities:  No Edema


Skin:  Warm


Labs





Laboratory Tests








Test


 5/19/19


12:30


 


O2 Saturation 93 % (92-99) 


 


Arterial Blood pH


 7.45


(7.35-7.45)


 


Arterial Blood pCO2 at


Patient Temp 29 mmHg


(35-46)


 


Arterial Blood pO2 at Patient


Temp 70 mmHg


()


 


Arterial Blood HCO3


 20 mmol/L


(21-28)


 


Arterial Blood Base Excess


 -3 mmol/L


(-3-3)


 


FiO2 28 


 


Sodium Level


 132 mmol/L


(136-145)


 


Potassium Level


 4.8 mmol/L


(3.5-5.1)


 


Chloride Level


 106 mmol/L


()


 


Carbon Dioxide Level


 17 mmol/L


(21-32)


 


Anion Gap 9 (6-14) 


 


Blood Urea Nitrogen


 42 mg/dL


(8-26)


 


Creatinine


 0.5 mg/dL


(0.7-1.3)


 


Estimated GFR


(Cockcroft-Gault) 164.4 





 


Glucose Level


 120 mg/dL


(70-99)


 


Calcium Level


 8.3 mg/dL


(8.5-10.1)








Medications





Active Scripts








 Medications  Dose


 Route/Sig


 Max Daily Dose Days Date Category


 


 Prednisone 20 Mg


 Tablet  30 Mg


 PO DAILY


  5 3/6/19 Reported


 


 Levaquin


  (Levofloxacin)


 500 Mg Tablet  1 Tab


 PO DAILY


  10 3/6/19 Reported


 


 Ibuprofen 400 Mg


 Tablet  400 Mg


 PO PRN Q6HRS PRN


   2/9/18 Reported


 


 Duoneb 0.5-3(2.5)


 Mg/3 Ml


  (Albuterol/Ipratropium)


 3 Ml Ampul.neb  3 Ml


 NEB QID


   2/9/18 Reported


 


 Aspir-Low


  (Aspirin) 81 Mg


 Tablet.dr  1 Tab


 PO DAILY


   4/21/17 Reported


 


 Ventolin Hfa


 Inhaler


  (Albuterol


 Sulfate) 18 Gm


 Hfa.aer.ad  


 


   4/21/17 Reported


 


 Losartan-Hctz


 50-12.5 Mg Tab


  (Losartan/Hydrochlorothiazide)


 1 Each Tablet  1 Tab


 PO DAILY


   4/21/17 Reported


 


 Simvastatin 40 Mg


 Tablet  40 Mg


 PO HS


   6/4/15 Reported


 


 Clopidogrel


  (Clopidogrel


 Bisulfate) 75 Mg


 Tablet  75 Mg


 PO DAILY


   6/4/15 Reported











Impression


.





IMPRESSION:


1.  Acute-on-chronic hypoxic and hypercapnic respiratory failure/ suspect severe

COPD, EXTUBATED 5/15


2.  Abnormal chest x-ray with bilateral interstitial infiltrates, improving


3.  Possible a/c diastolic heart failure


4.  Abnormal D-dimer. clinical suspicion for PE/DVT low


5.  Azotemia.


6.  Septic shock pt with tachycardia lactic acidosis , resolved


7.   dysphagia


8.  Protein malnutrition POA moderate to severe


9.  S/P BRONCH ON 5/6














VENOUS DOPPLER 5/6


IMPRESSION:   There is no sonographic evidence of deep vein thrombosis in 


either lower extremity. 





IMPRESSION: 5/6


1. No CT evidence of central pulmonary emboli.


2. Emphysema.


3. Worsening tree-in-bud and interstitial opacities predominantly in the 


lower chest with mild confluent infiltrate posteriorly in the right lower 


lobe. An inflammatory/infectious etiology is most likely.





Plan


.


PT REFUSES DOBBOFF 


REVIEWED BENEFITS OF ENTERAL FEEDING HE WISHES TO D/W DR CHERY





WILL NEED REHAB





DECLINED BIPAP QHS


WILL CONTINUE SUPPORT


DIAGNOSTIC BRONCH SO FAR CULTURES NEGATIVE 


ANTIBX


VIDEO PENDING


PT OT


DVT AND GI PROPH


BD


OFF ABX, NO FURTHER FEVER


D/W PAT PALLIATIVE CARE











ERNESTINA MIGUEL MD              May 21, 2019 09:08

## 2019-05-22 VITALS — SYSTOLIC BLOOD PRESSURE: 140 MMHG | DIASTOLIC BLOOD PRESSURE: 66 MMHG

## 2019-05-22 VITALS — SYSTOLIC BLOOD PRESSURE: 121 MMHG | DIASTOLIC BLOOD PRESSURE: 75 MMHG

## 2019-05-22 VITALS — DIASTOLIC BLOOD PRESSURE: 70 MMHG | SYSTOLIC BLOOD PRESSURE: 129 MMHG

## 2019-05-22 VITALS — SYSTOLIC BLOOD PRESSURE: 105 MMHG | DIASTOLIC BLOOD PRESSURE: 67 MMHG

## 2019-05-22 VITALS — SYSTOLIC BLOOD PRESSURE: 136 MMHG | DIASTOLIC BLOOD PRESSURE: 77 MMHG

## 2019-05-22 LAB
ANION GAP SERPL CALC-SCNC: 7 MMOL/L (ref 6–14)
BUN SERPL-MCNC: 35 MG/DL (ref 8–26)
CALCIUM SERPL-MCNC: 8.2 MG/DL (ref 8.5–10.1)
CHLORIDE SERPL-SCNC: 103 MMOL/L (ref 98–107)
CO2 SERPL-SCNC: 27 MMOL/L (ref 21–32)
CREAT SERPL-MCNC: 0.6 MG/DL (ref 0.7–1.3)
GFR SERPLBLD BASED ON 1.73 SQ M-ARVRAT: 133.2 ML/MIN
GLUCOSE SERPL-MCNC: 88 MG/DL (ref 70–99)
POTASSIUM SERPL-SCNC: 3.7 MMOL/L (ref 3.5–5.1)
SODIUM SERPL-SCNC: 137 MMOL/L (ref 136–145)

## 2019-05-22 RX ADMIN — CLOPIDOGREL BISULFATE SCH MG: 75 TABLET ORAL at 07:16

## 2019-05-22 RX ADMIN — SIMVASTATIN SCH MG: 40 TABLET, FILM COATED ORAL at 20:39

## 2019-05-22 RX ADMIN — IPRATROPIUM BROMIDE AND ALBUTEROL SULFATE SCH ML: .5; 3 SOLUTION RESPIRATORY (INHALATION) at 19:43

## 2019-05-22 RX ADMIN — GLYCERIN, ISOLEUCINE, LEUCINE, LYSINE, METHIONINE, PHENYLALANINE, THREONINE, TRYPTOPHAN, VALINE, ALANINE, GLYCINE, ARGININE, HISTIDINE, PROLINE, SERINE, CYSTEINE, SODIUM ACETATE, MAGNESIUM ACETATE, CALCIUM ACETATE, SODIUM CHLORIDE, POTASSIUM CHLORIDE, PHOSPHORIC ACID, AND POTASSIUM METABISULFITE SCH MLS/HR
3; .21; .27; .22; .16; .17; .12; .046; .2; .21; .42; .29; .085; .34; .18; .014; .2; .054; .026; .12; .15; .041 INJECTION INTRAVENOUS at 16:49

## 2019-05-22 RX ADMIN — METHYLPREDNISOLONE SODIUM SUCCINATE SCH MG: 40 INJECTION, POWDER, FOR SOLUTION INTRAMUSCULAR; INTRAVENOUS at 06:10

## 2019-05-22 RX ADMIN — GLYCERIN, ISOLEUCINE, LEUCINE, LYSINE, METHIONINE, PHENYLALANINE, THREONINE, TRYPTOPHAN, VALINE, ALANINE, GLYCINE, ARGININE, HISTIDINE, PROLINE, SERINE, CYSTEINE, SODIUM ACETATE, MAGNESIUM ACETATE, CALCIUM ACETATE, SODIUM CHLORIDE, POTASSIUM CHLORIDE, PHOSPHORIC ACID, AND POTASSIUM METABISULFITE SCH MLS/HR
3; .21; .27; .22; .16; .17; .12; .046; .2; .21; .42; .29; .085; .34; .18; .014; .2; .054; .026; .12; .15; .041 INJECTION INTRAVENOUS at 20:44

## 2019-05-22 RX ADMIN — ENOXAPARIN SODIUM SCH MG: 40 INJECTION SUBCUTANEOUS at 09:15

## 2019-05-22 RX ADMIN — NICOTINE SCH PATCH: 14 PATCH, EXTENDED RELEASE TOPICAL at 07:17

## 2019-05-22 RX ADMIN — FAMOTIDINE SCH MG: 10 INJECTION, SOLUTION INTRAVENOUS at 20:39

## 2019-05-22 RX ADMIN — GLYCERIN, ISOLEUCINE, LEUCINE, LYSINE, METHIONINE, PHENYLALANINE, THREONINE, TRYPTOPHAN, VALINE, ALANINE, GLYCINE, ARGININE, HISTIDINE, PROLINE, SERINE, CYSTEINE, SODIUM ACETATE, MAGNESIUM ACETATE, CALCIUM ACETATE, SODIUM CHLORIDE, POTASSIUM CHLORIDE, PHOSPHORIC ACID, AND POTASSIUM METABISULFITE SCH MLS/HR
3; .21; .27; .22; .16; .17; .12; .046; .2; .21; .42; .29; .085; .34; .18; .014; .2; .054; .026; .12; .15; .041 INJECTION INTRAVENOUS at 06:11

## 2019-05-22 RX ADMIN — FAMOTIDINE SCH MG: 10 INJECTION, SOLUTION INTRAVENOUS at 09:14

## 2019-05-22 RX ADMIN — ASPIRIN 81 MG SCH MG: 81 TABLET ORAL at 07:16

## 2019-05-22 RX ADMIN — IPRATROPIUM BROMIDE AND ALBUTEROL SULFATE SCH ML: .5; 3 SOLUTION RESPIRATORY (INHALATION) at 08:10

## 2019-05-22 RX ADMIN — IPRATROPIUM BROMIDE AND ALBUTEROL SULFATE SCH ML: .5; 3 SOLUTION RESPIRATORY (INHALATION) at 12:12

## 2019-05-22 RX ADMIN — IPRATROPIUM BROMIDE AND ALBUTEROL SULFATE SCH ML: .5; 3 SOLUTION RESPIRATORY (INHALATION) at 15:22

## 2019-05-22 NOTE — RAD
Video dysphasia study, 5/22/2019:



History: Dysphagia, recent intubation



The swallowing mechanism was examined fluoroscopically in the lateral

projection while the patient ingested a variety of food materials mixed with

barium.  2.5 minutes of fluoroscopy time was utilized.  One video fluoroscopic

loop was recorded by a member of the month.



When ingesting the honey thickened material there was a delay in initiation of

pharyngeal peristalsis.  The pharyngeal peristalsis is weak with poor

epiglottic inversion.  There tends to be a moderate amount of vallecular and

piriform sinus residue.  Intermittent mild laryngeal penetration during

swallowing and delayed laryngeal penetration related to the residue eventually

resulted in extension of material down to the vocal cords and minimal

aspiration.  Similar findings of which pharyngeal peristalsis were evident

with the pudding consistency material.  The study was terminated at this point

due to these findings.

## 2019-05-22 NOTE — PDOC
Provider Note


Provider Note


vss, no temp, edema better re lasix- bmp good, video swallow pending- sleeping 

now- d/w familly options, re PEG, they are not inclined to do peg as he would 

not want, but will wait re swallow eval first- continue current care











GUS CHERY MD             May 22, 2019 08:50

## 2019-05-22 NOTE — PDOC2
GI CONSULT


Reason For Consult:


PEG tube placement





HPI:


HPI:


68 y/o male admitted 5/5/19 w/ resp failure, was intubated, then extubated on 

5/15.  Has since failed swallow evaluations and we have been asked to see re: 

PEG placement.  He was alone in his room when I saw him this afternoon.





GI-wise has remote h/o "ulcer" but has not had previous EGD.  H/o heartburn and 

"stomach pain" prior to admission that he treated w/ ibuprofen.  Pain has res

olved. Does have h/o CVA but denies dysphagia issues prior to this.  Denies n/v.

 No diarrhea, constipation, or bleeding.  No previous colonoscopy.  Notes list 

h/o cholecystectomy which he denies.  No liver or pancreas history.  H/o CAD and

PAD previously on Plavix and ASA.  On Lovenox, IV steroids, PPN, and IV H2 

blocker here.





PMH:


PMH:


CAD, PAD, COPD, CVA, HTN, HLD, DVT, BPH


left femoral bypass, neck surgery





Social History:


Smoke:  1 pack per day


ALCOHOL:  none


Drugs:  None





ROS:





GEN: Denies fevers, chills, sweats


HEENT: Denies blurred vision, sore throat


CV: Denies chest pain


RESP: +chronic SOA


GI: Per HPI


: Denies hematuria, dysuria


ENDO: Denies weight changes


NEURO/MSK: weakness


SKIN: Denies jaundice, pruritus





Vitals:


Vitals:





                                   Vital Signs








  Date Time  Temp Pulse Resp B/P (MAP) Pulse Ox O2 Delivery O2 Flow Rate FiO2


 


5/22/19 15:22      Nasal Cannula 2.0 


 


5/22/19 14:48 97.5 57 20 129/70 (89) 95   





 97.5       











Labs:


Labs:





Laboratory Tests








Test


 5/22/19


06:00


 


Sodium Level


 137 mmol/L


(136-145)


 


Potassium Level


 3.7 mmol/L


(3.5-5.1)


 


Chloride Level


 103 mmol/L


()


 


Carbon Dioxide Level


 27 mmol/L


(21-32)


 


Anion Gap 7 (6-14) 


 


Blood Urea Nitrogen


 35 mg/dL


(8-26)


 


Creatinine


 0.6 mg/dL


(0.7-1.3)


 


Estimated GFR


(Cockcroft-Gault) 133.2 





 


Glucose Level


 88 mg/dL


(70-99)


 


Calcium Level


 8.2 mg/dL


(8.5-10.1)





Note


[facility] Accession Number: [id number]


   TESTS               RESULT  FLAG  UNITS    REF RANGE  LAB


------------------------------------------------------------


   Clinician Provided Cytology Information


   No. of containers..01 Other (Miscellaneous)


Source:                                                   01


   BAL RML/RLL MIXED


DIAGNOSIS:                                                02


   BAL RML/RLL MIXED


   NEGATIVE FOR MALIGNANT CELLS.


   BRONCHIAL EPITHELIAL CELLS, PULMONARY MACROPHAGES, AND INFLAMMATORY CELLS


   PRESENT.


Signed out by:                                            02


   Rome Patterson MD, Pathologist


   NPI- [id number]


Performed by:                                             01


   Ambreen Robbins, Cytotechnologist ([facility])


Gross description:                                        01


   5ML, WHITE, CLOUDY


   /LCS





  AFB SPECIMEN PROCESSING  Final  


        Concentration





  AFB CULTURE FINAL  


                                PENDING





  AFB CULTURE GRAM STAIN  Final  


        Negative





        Performed at:   - [facility] [address], [place], [place]  [id number]


        : LYNNE aGo MD, Phone:  [phone]





  FUNGAL CULTURE,OTHER  Preliminary  


        Preliminary report





  CLAUDE CULT RES 1  Preliminary  


        Comment





        No fungi isolated in 48 hours. Cultures are examined weekly


        and positives are reported as detected.











  BRONCH CULTURE  Final  


        Final report





  BRONCH RES 1  Final  


        Comment





        Routine respiratory daniel








  BLOOD CULTURE  Final  


        NO GROWTH AFTER 5 DAYS





Allergies:


Coded Allergies:  


     Penicillins (Verified  Allergy, Severe, Shortness of Air, 5/13/19)


   


   TOLERATES CEFTRIAXONE


     codeine (Verified  Allergy, Severe, Shortness of Air, 1/15/14)





Medications:





Current Medications








 Medications


  (Trade)  Dose


 Ordered  Sig/Tin


 Route


 PRN Reason  Start Time


 Stop Time Status Last Admin


Dose Admin


 


 Barium Sulfate


  (Varibar Thin


 Liquid Apple)  148 gm  1X  ONCE


 PO


   5/22/19 11:00


 5/22/19 11:01 DC 5/22/19 11:39














Imaging:


Imaging:


Videoswallow 5/22/19


History: Dysphagia, recent intubation


The swallowing mechanism was examined fluoroscopically in the lateral projection

while the patient ingested a variety of food materials mixed with barium.  2.5 

minutes of fluoroscopy time was utilized.  One video fluoroscopic loop was 

recorded by a member of the month. When ingesting the honey thickened material 

there was a delay in initiation of pharyngeal peristalsis.  The pharyngeal 

peristalsis is weak with poor


epiglottic inversion.  There tends to be a moderate amount of vallecular and 

piriform sinus residue.  Intermittent mild laryngeal penetration during 

swallowing and delayed laryngeal penetration related to the residue eventually 

resulted in extension of material down to the vocal cords and minimal 

aspiration.  Similar findings of which pharyngeal peristalsis were evident with 

the pudding consistency material.  The study was terminated at this point due to

these findings.


Initial Videoswallow Study Report


Pt demo'd penetration w/aspiration p.swallow of honey thick liquid. Similar 

pattern was noted w/puree, but w/o overt aspiration. Study to be reviewed. 

However, given residue amts and pattern of residue present in the pharynx, same 

risk exists. Deep penetration, not cleared at swallow w/suspected trace 

aspiration p.swallow was noted w/thin via tsp. 


IMPRESSIONS: Moderate to moderate-severe oropharyngeal dysphagia w/HIGH risk of 

SILENT aspiration. NPO indicated, depending on goals of care. Etiology of 

dysphagia is c/w recent 10 day intubation and generalized weakness. Anticipate 

need for non-oral meds and nutrition for at least immediate future. 


RECOMMENDATIONS: NPO meds and nutrition. Consider more long-term means of 

nutrition if c/w pt/family goals of care. Results explained to pt w/review of 

study images. See full rpt in interventions section. 





CXR 5/18


IMPRESSION:  No acute cardiopulmonary findings.





LE US 5/6


IMPRESSION:   There is no sonographic evidence of deep vein thrombosis in either

lower extremity.





PE:





GEN: NAD


HEENT: Atraumatic, PERRL


LUNGS: NC, diminished


HEART: RRR, distant


ABD: NABS, S/ND/NT


EXTREMITY: No edema


SKIN: No rashes, no jaundice


NEURO/PSYCH: A & O 3





A/P:


A/P:


Resp failure - extubated 5/15, now w/ dysphagia


Heartburn, remote h/o "ulcer"


PAD and CAD, h/o CVA





--


Discussed PEG procedure and potential risks/complications.  He would like to 

proceed.  Will review w/ Dr. Shelton.











DONNA ENRIQUE         May 22, 2019 16:03

## 2019-05-22 NOTE — PDOC
PULMONARY PROGRESS NOTES


Subjective


EXTUBATED 5/15


NOT MORE SOA WANTS TO EAT


Vitals





Vital Signs








  Date Time  Temp Pulse Resp B/P (MAP) Pulse Ox O2 Delivery O2 Flow Rate FiO2


 


5/22/19 08:11     94 Nasal Cannula 3.0 


 


5/22/19 07:00 97.4 66 19 136/77 (96)    





 97.4       








ROS:  No Nausea, No Chest Pain, No Abdominal Pain, No Increase Cough


General:  Alert, No acute distress


HEENT:  Other


Lungs:  Other (decrease bs)


Cardiovascular:  S1, S2


Abdomen:  Soft, Non-tender


Neuro Exam:  Alert, Oriented


Extremities:  No Edema


Skin:  Warm


Labs





Laboratory Tests








Test


 5/22/19


06:00


 


Sodium Level


 137 mmol/L


(136-145)


 


Potassium Level


 3.7 mmol/L


(3.5-5.1)


 


Chloride Level


 103 mmol/L


()


 


Carbon Dioxide Level


 27 mmol/L


(21-32)


 


Anion Gap 7 (6-14) 


 


Blood Urea Nitrogen


 35 mg/dL


(8-26)


 


Creatinine


 0.6 mg/dL


(0.7-1.3)


 


Estimated GFR


(Cockcroft-Gault) 133.2 





 


Glucose Level


 88 mg/dL


(70-99)


 


Calcium Level


 8.2 mg/dL


(8.5-10.1)








Laboratory Tests








Test


 5/22/19


06:00


 


Sodium Level


 137 mmol/L


(136-145)


 


Potassium Level


 3.7 mmol/L


(3.5-5.1)


 


Chloride Level


 103 mmol/L


()


 


Carbon Dioxide Level


 27 mmol/L


(21-32)


 


Anion Gap 7 (6-14) 


 


Blood Urea Nitrogen


 35 mg/dL


(8-26)


 


Creatinine


 0.6 mg/dL


(0.7-1.3)


 


Estimated GFR


(Cockcroft-Gault) 133.2 





 


Glucose Level


 88 mg/dL


(70-99)


 


Calcium Level


 8.2 mg/dL


(8.5-10.1)








Medications





Active Scripts








 Medications  Dose


 Route/Sig


 Max Daily Dose Days Date Category


 


 Prednisone 20 Mg


 Tablet  30 Mg


 PO DAILY


  5 3/6/19 Reported


 


 Levaquin


  (Levofloxacin)


 500 Mg Tablet  1 Tab


 PO DAILY


  10 3/6/19 Reported


 


 Ibuprofen 400 Mg


 Tablet  400 Mg


 PO PRN Q6HRS PRN


   2/9/18 Reported


 


 Duoneb 0.5-3(2.5)


 Mg/3 Ml


  (Albuterol/Ipratropium)


 3 Ml Ampul.neb  3 Ml


 NEB QID


   2/9/18 Reported


 


 Aspir-Low


  (Aspirin) 81 Mg


 Tablet.dr  1 Tab


 PO DAILY


   4/21/17 Reported


 


 Ventolin Hfa


 Inhaler


  (Albuterol


 Sulfate) 18 Gm


 Hfa.aer.ad  


 


   4/21/17 Reported


 


 Losartan-Hctz


 50-12.5 Mg Tab


  (Losartan/Hydrochlorothiazide)


 1 Each Tablet  1 Tab


 PO DAILY


   4/21/17 Reported


 


 Simvastatin 40 Mg


 Tablet  40 Mg


 PO HS


   6/4/15 Reported


 


 Clopidogrel


  (Clopidogrel


 Bisulfate) 75 Mg


 Tablet  75 Mg


 PO DAILY


   6/4/15 Reported











Impression


.





IMPRESSION:


1.  Acute-on-chronic hypoxic and hypercapnic respiratory failure/ suspect severe

COPD, EXTUBATED 5/15


2.  Abnormal chest x-ray with bilateral interstitial infiltrates, improving


3.  Possible a/c diastolic heart failure


4.  Abnormal D-dimer. clinical suspicion for PE/DVT low


5.  Azotemia.


6.  Septic shock pt with tachycardia lactic acidosis , resolved


7.   dysphagia


8.  Protein malnutrition POA moderate to severe


9.  S/P BRONCH ON 5/6 5/21 video


The swallowing mechanism was examined fluoroscopically in the lateral


projection while the patient ingested a variety of food materials mixed with


barium.  2.5 minutes of fluoroscopy time was utilized.  One video fluoroscopic


loop was recorded by a member of the month.





When ingesting the honey thickened material there was a delay in initiation of


pharyngeal peristalsis.  The pharyngeal peristalsis is weak with poor


epiglottic inversion.  There tends to be a moderate amount of vallecular and


piriform sinus residue.  Intermittent mild laryngeal penetration during


swallowing and delayed laryngeal penetration related to the residue eventually


resulted in extension of material down to the vocal cords and minimal


aspiration.  Similar findings of which pharyngeal peristalsis were evident


with the pudding consistency material.  The study was terminated at this point


due to these findings.











VENOUS DOPPLER 5/6


IMPRESSION:   There is no sonographic evidence of deep vein thrombosis in 


either lower extremity. 





IMPRESSION: 5/6


1. No CT evidence of central pulmonary emboli.


2. Emphysema.


3. Worsening tree-in-bud and interstitial opacities predominantly in the 


lower chest with mild confluent infiltrate posteriorly in the right lower 


lobe. An inflammatory/infectious etiology is most likely.





Plan


.


WILL DEFER TO DR CHERY REGARDING PEG AND NUTRITION 


WILL NEED REHAB


FAILED VIDEO


DECLINED BIPAP QHS


WILL CONTINUE SUPPORT


DIAGNOSTIC BRONCH SO FAR CULTURES NEGATIVE 


ANTIBX





PT OT


DVT AND GI PROPH


BD


OFF ABX, NO FURTHER FEVER


D/W PAT PALLIATIVE CARE











ERNESTINA MIGUEL MD              May 22, 2019 09:02

## 2019-05-23 VITALS — DIASTOLIC BLOOD PRESSURE: 74 MMHG | SYSTOLIC BLOOD PRESSURE: 107 MMHG

## 2019-05-23 VITALS — SYSTOLIC BLOOD PRESSURE: 110 MMHG | DIASTOLIC BLOOD PRESSURE: 62 MMHG

## 2019-05-23 VITALS — SYSTOLIC BLOOD PRESSURE: 109 MMHG | DIASTOLIC BLOOD PRESSURE: 70 MMHG

## 2019-05-23 VITALS — DIASTOLIC BLOOD PRESSURE: 77 MMHG | SYSTOLIC BLOOD PRESSURE: 133 MMHG

## 2019-05-23 VITALS — DIASTOLIC BLOOD PRESSURE: 72 MMHG | SYSTOLIC BLOOD PRESSURE: 127 MMHG

## 2019-05-23 VITALS — DIASTOLIC BLOOD PRESSURE: 76 MMHG | SYSTOLIC BLOOD PRESSURE: 155 MMHG

## 2019-05-23 LAB — PROTHROMBIN TIME: 12.9 SEC (ref 11.7–14)

## 2019-05-23 RX ADMIN — IPRATROPIUM BROMIDE AND ALBUTEROL SULFATE SCH ML: .5; 3 SOLUTION RESPIRATORY (INHALATION) at 07:05

## 2019-05-23 RX ADMIN — METHYLPREDNISOLONE SODIUM SUCCINATE SCH MG: 40 INJECTION, POWDER, FOR SOLUTION INTRAMUSCULAR; INTRAVENOUS at 07:00

## 2019-05-23 RX ADMIN — ASPIRIN 81 MG SCH MG: 81 TABLET ORAL at 08:00

## 2019-05-23 RX ADMIN — FAMOTIDINE SCH MG: 10 INJECTION, SOLUTION INTRAVENOUS at 20:28

## 2019-05-23 RX ADMIN — CLOPIDOGREL BISULFATE SCH MG: 75 TABLET ORAL at 09:00

## 2019-05-23 RX ADMIN — IPRATROPIUM BROMIDE AND ALBUTEROL SULFATE SCH ML: .5; 3 SOLUTION RESPIRATORY (INHALATION) at 11:47

## 2019-05-23 RX ADMIN — GLYCERIN, ISOLEUCINE, LEUCINE, LYSINE, METHIONINE, PHENYLALANINE, THREONINE, TRYPTOPHAN, VALINE, ALANINE, GLYCINE, ARGININE, HISTIDINE, PROLINE, SERINE, CYSTEINE, SODIUM ACETATE, MAGNESIUM ACETATE, CALCIUM ACETATE, SODIUM CHLORIDE, POTASSIUM CHLORIDE, PHOSPHORIC ACID, AND POTASSIUM METABISULFITE SCH MLS/HR
3; .21; .27; .22; .16; .17; .12; .046; .2; .21; .42; .29; .085; .34; .18; .014; .2; .054; .026; .12; .15; .041 INJECTION INTRAVENOUS at 03:39

## 2019-05-23 RX ADMIN — FAMOTIDINE SCH MG: 10 INJECTION, SOLUTION INTRAVENOUS at 10:37

## 2019-05-23 RX ADMIN — SIMVASTATIN SCH MG: 40 TABLET, FILM COATED ORAL at 20:28

## 2019-05-23 RX ADMIN — NICOTINE SCH PATCH: 14 PATCH, EXTENDED RELEASE TOPICAL at 09:00

## 2019-05-23 RX ADMIN — IPRATROPIUM BROMIDE AND ALBUTEROL SULFATE SCH ML: .5; 3 SOLUTION RESPIRATORY (INHALATION) at 15:56

## 2019-05-23 RX ADMIN — IPRATROPIUM BROMIDE AND ALBUTEROL SULFATE SCH ML: .5; 3 SOLUTION RESPIRATORY (INHALATION) at 19:13

## 2019-05-23 NOTE — NUR
Patient upset this am stating that he "wants a couple steaks." Patient also verbalized that 
he was going to [facility] the [facility] because they "are starving me." Patient also verbalizing 
that he is upset about his bilateral arm swelling and left lower extremity weakness. Dr. Carmona was notified of patients left leg yesterday, and verbalized to the day nurse that he 
would assess the patient in the am. SL in right arm flushed with no resistance. Will leave 
intact.  Assessed patient in attempt to place a different peripheral iv, due to the pitting 
edema in both upper extremities and was unable to find placement.  PPN held at this time.  
Will notify Dr. Carmona of patient status. Oral care provider to patient, will continue to 
monitor

## 2019-05-23 NOTE — PDOC
Subjective:


Subjective:


Tugging at catheter.


Says he wants a feeding tube.





Objective:


Objective:


RN says pt and wife discussed PEG with Dr. Carmona this morning and both would 

like to proceed w/ PEG.


Reviewed nursing note from earlier - pt upset, wanting to eat, etc.


Vital Signs:





                                   Vital Signs








  Date Time  Temp Pulse Resp B/P (MAP) Pulse Ox O2 Delivery O2 Flow Rate FiO2


 


5/23/19 07:25 97.6 83 20 133/77 (95) 92 Nasal Cannula 2.5 





 97.6       








Labs:





Laboratory Tests








Test


 5/23/19


02:30


 


Prothrombin Time 12.9 SEC 


 


Prothromb Time International


Ratio 1.0 














PE:





GEN: NAD


LUNGS: room air


HEART: RRR


ABD: S/ND/NT


NEURO/PSYCH: appropriate





A/P:


Oropharyngeal dysphagia





--


Has not received Plavix or ASA since admission, though has been on Lovenox 

(stopped yesterday).


Will review w/ Dr. Shelton.











DONNA ENRIQUE         May 23, 2019 10:49

## 2019-05-23 NOTE — PDOC
PULMONARY PROGRESS NOTES


Subjective


EXTUBATED 5/15


NOT MORE SOA WANTS TO EAT


Vitals





Vital Signs








  Date Time  Temp Pulse Resp B/P (MAP) Pulse Ox O2 Delivery O2 Flow Rate FiO2


 


5/23/19 07:25 97.6 83 20 133/77 (95) 92 Nasal Cannula 2.5 





 97.6       








ROS:  No Nausea, No Chest Pain, No Abdominal Pain, No Increase Cough


General:  Alert, No acute distress


HEENT:  Other


Lungs:  Other (decrease bs)


Cardiovascular:  S1, S2


Abdomen:  Soft, Non-tender


Neuro Exam:  Alert, Oriented


Extremities:  No Edema


Skin:  Warm


Labs





Laboratory Tests








Test


 5/22/19


06:00 5/23/19


02:30


 


Sodium Level


 137 mmol/L


(136-145) 





 


Potassium Level


 3.7 mmol/L


(3.5-5.1) 





 


Chloride Level


 103 mmol/L


() 





 


Carbon Dioxide Level


 27 mmol/L


(21-32) 





 


Anion Gap 7 (6-14)  


 


Blood Urea Nitrogen


 35 mg/dL


(8-26) 





 


Creatinine


 0.6 mg/dL


(0.7-1.3) 





 


Estimated GFR


(Cockcroft-Gault) 133.2 


 





 


Glucose Level


 88 mg/dL


(70-99) 





 


Calcium Level


 8.2 mg/dL


(8.5-10.1) 





 


Prothrombin Time


 


 12.9 SEC


(11.7-14.0)


 


Prothromb Time International


Ratio 


 1.0 (0.8-1.1) 











Laboratory Tests








Test


 5/23/19


02:30


 


Prothrombin Time


 12.9 SEC


(11.7-14.0)


 


Prothromb Time International


Ratio 1.0 (0.8-1.1) 











Medications





Active Scripts








 Medications  Dose


 Route/Sig


 Max Daily Dose Days Date Category


 


 Prednisone 20 Mg


 Tablet  30 Mg


 PO DAILY


  5 3/6/19 Reported


 


 Levaquin


  (Levofloxacin)


 500 Mg Tablet  1 Tab


 PO DAILY


  10 3/6/19 Reported


 


 Ibuprofen 400 Mg


 Tablet  400 Mg


 PO PRN Q6HRS PRN


   2/9/18 Reported


 


 Duoneb 0.5-3(2.5)


 Mg/3 Ml


  (Albuterol/Ipratropium)


 3 Ml Ampul.neb  3 Ml


 NEB QID


   2/9/18 Reported


 


 Aspir-Low


  (Aspirin) 81 Mg


 Tablet.dr  1 Tab


 PO DAILY


   4/21/17 Reported


 


 Ventolin Hfa


 Inhaler


  (Albuterol


 Sulfate) 18 Gm


 Hfa.aer.ad  


 


   4/21/17 Reported


 


 Losartan-Hctz


 50-12.5 Mg Tab


  (Losartan/Hydrochlorothiazide)


 1 Each Tablet  1 Tab


 PO DAILY


   4/21/17 Reported


 


 Simvastatin 40 Mg


 Tablet  40 Mg


 PO HS


   6/4/15 Reported


 


 Clopidogrel


  (Clopidogrel


 Bisulfate) 75 Mg


 Tablet  75 Mg


 PO DAILY


   6/4/15 Reported











Impression


.





IMPRESSION:


1.  Acute-on-chronic hypoxic and hypercapnic respiratory failure/ suspect severe

COPD, EXTUBATED 5/15


2.  Abnormal chest x-ray with bilateral interstitial infiltrates, improving


3.  Possible a/c diastolic heart failure


4.  Abnormal D-dimer. clinical suspicion for PE/DVT low


5.  Azotemia.


6.  Septic shock pt with tachycardia lactic acidosis , resolved


7.   dysphagia


8.  Protein malnutrition POA moderate to severe


9.  S/P BRONCH ON 5/6 5/21 video


The swallowing mechanism was examined fluoroscopically in the lateral


projection while the patient ingested a variety of food materials mixed with


barium.  2.5 minutes of fluoroscopy time was utilized.  One video fluoroscopic


loop was recorded by a member of the month.





When ingesting the honey thickened material there was a delay in initiation of


pharyngeal peristalsis.  The pharyngeal peristalsis is weak with poor


epiglottic inversion.  There tends to be a moderate amount of vallecular and


piriform sinus residue.  Intermittent mild laryngeal penetration during


swallowing and delayed laryngeal penetration related to the residue eventually


resulted in extension of material down to the vocal cords and minimal


aspiration.  Similar findings of which pharyngeal peristalsis were evident


with the pudding consistency material.  The study was terminated at this point


due to these findings.











VENOUS DOPPLER 5/6


IMPRESSION:   There is no sonographic evidence of deep vein thrombosis in 


either lower extremity. 





IMPRESSION: 5/6


1. No CT evidence of central pulmonary emboli.


2. Emphysema.


3. Worsening tree-in-bud and interstitial opacities predominantly in the 


lower chest with mild confluent infiltrate posteriorly in the right lower 


lobe. An inflammatory/infectious etiology is most likely.





Plan


.


PER DR CHERY PEG TO BE PLACED





WILL NEED REHAB


FAILED VIDEO


DECLINED BIPAP QHS


WILL CONTINUE SUPPORT


DIAGNOSTIC BRONCH SO FAR CULTURES NEGATIVE 


ANTIBX





PT OT


DVT AND GI PROPH


BD


OFF ABX, NO FURTHER FEVER


D/W PAT PALLIATIVE CARE











ERNESTINA MIGUEL MD              May 23, 2019 10:14

## 2019-05-23 NOTE — NUR
SW following pt. Insurance denied LTAC stating Pt's needs can be met at a lower level of 
care even if SNU is not capable of doing PPN. A video swallow eval was done yesterday and 
recommendation is NPO. SW will await for Physician to address form of nutrition with family 
to continue with dc panning. Will continue to follow. Discussed with RN.

## 2019-05-23 NOTE — PDOC
Provider Note


Provider Note


pt has decided to have PEG, and family agrees- he remains confused and 

disoriented, failed swallow test- off lovenox 24 hrs so ok to proceed - dc 

steroids re edema and infection risk, can resume asa/plavix after peg in- he may

become more lucid w/ calories- NEEDS TO BE IN HOSPITAL











GUS CHERY MD             May 23, 2019 08:36

## 2019-05-24 VITALS — DIASTOLIC BLOOD PRESSURE: 46 MMHG | SYSTOLIC BLOOD PRESSURE: 97 MMHG

## 2019-05-24 VITALS — DIASTOLIC BLOOD PRESSURE: 55 MMHG | SYSTOLIC BLOOD PRESSURE: 91 MMHG

## 2019-05-24 VITALS — DIASTOLIC BLOOD PRESSURE: 66 MMHG | SYSTOLIC BLOOD PRESSURE: 111 MMHG

## 2019-05-24 VITALS — SYSTOLIC BLOOD PRESSURE: 142 MMHG | DIASTOLIC BLOOD PRESSURE: 64 MMHG

## 2019-05-24 VITALS — SYSTOLIC BLOOD PRESSURE: 115 MMHG | DIASTOLIC BLOOD PRESSURE: 78 MMHG

## 2019-05-24 VITALS — SYSTOLIC BLOOD PRESSURE: 144 MMHG | DIASTOLIC BLOOD PRESSURE: 64 MMHG

## 2019-05-24 VITALS — SYSTOLIC BLOOD PRESSURE: 138 MMHG | DIASTOLIC BLOOD PRESSURE: 72 MMHG

## 2019-05-24 PROCEDURE — 0DH68UZ INSERTION OF FEEDING DEVICE INTO STOMACH, VIA NATURAL OR ARTIFICIAL OPENING ENDOSCOPIC: ICD-10-PCS

## 2019-05-24 RX ADMIN — HYDROCODONE BITARTRATE AND ACETAMINOPHEN PRN TAB: 5; 325 TABLET ORAL at 21:13

## 2019-05-24 RX ADMIN — CLOPIDOGREL BISULFATE SCH MG: 75 TABLET ORAL at 08:00

## 2019-05-24 RX ADMIN — IPRATROPIUM BROMIDE AND ALBUTEROL SULFATE SCH ML: .5; 3 SOLUTION RESPIRATORY (INHALATION) at 08:31

## 2019-05-24 RX ADMIN — IPRATROPIUM BROMIDE AND ALBUTEROL SULFATE SCH ML: .5; 3 SOLUTION RESPIRATORY (INHALATION) at 12:00

## 2019-05-24 RX ADMIN — FAMOTIDINE SCH MG: 10 INJECTION, SOLUTION INTRAVENOUS at 10:06

## 2019-05-24 RX ADMIN — FAMOTIDINE SCH MG: 10 INJECTION, SOLUTION INTRAVENOUS at 21:18

## 2019-05-24 RX ADMIN — ASPIRIN 81 MG SCH MG: 81 TABLET ORAL at 08:00

## 2019-05-24 RX ADMIN — FAMOTIDINE SCH MG: 10 INJECTION, SOLUTION INTRAVENOUS at 13:05

## 2019-05-24 RX ADMIN — NICOTINE SCH PATCH: 14 PATCH, EXTENDED RELEASE TOPICAL at 10:13

## 2019-05-24 RX ADMIN — IPRATROPIUM BROMIDE AND ALBUTEROL SULFATE SCH ML: .5; 3 SOLUTION RESPIRATORY (INHALATION) at 15:56

## 2019-05-24 RX ADMIN — SIMVASTATIN SCH MG: 40 TABLET, FILM COATED ORAL at 21:13

## 2019-05-24 RX ADMIN — NICOTINE SCH PATCH: 14 PATCH, EXTENDED RELEASE TOPICAL at 09:00

## 2019-05-24 RX ADMIN — SODIUM CHLORIDE, SODIUM LACTATE, POTASSIUM CHLORIDE, AND CALCIUM CHLORIDE SCH MLS/HR: .6; .31; .03; .02 INJECTION, SOLUTION INTRAVENOUS at 12:55

## 2019-05-24 RX ADMIN — SODIUM CHLORIDE, SODIUM LACTATE, POTASSIUM CHLORIDE, AND CALCIUM CHLORIDE SCH MLS/HR: .6; .31; .03; .02 INJECTION, SOLUTION INTRAVENOUS at 20:55

## 2019-05-24 RX ADMIN — IPRATROPIUM BROMIDE AND ALBUTEROL SULFATE SCH ML: .5; 3 SOLUTION RESPIRATORY (INHALATION) at 19:55

## 2019-05-24 NOTE — PDOC4
PROCEDURE


Procedure


EGD/PEG





Indication: OP dysphagia





Meds: per anesthesia





Findings:





E--LA grade A esophagitis at 40cm.


G--Normal


D--patchy erythema, bulb





--20F g-tube placed uneventfully.  Re-introduced scope, confirming good 

position.





Brandy. well.





IMP: GERD


        Successful PEG





REC: water/meds per tube today.


         Feed in AM if OK.


         Abdominal binder at all times.





Thanks.











LAUREEN AMARAL MD          May 24, 2019 13:58

## 2019-05-24 NOTE — PDOC
PULMONARY PROGRESS NOTES


Subjective


EXTUBATED 5/15


NOT MORE SOA WANTS TO EAT


Vitals





Vital Signs








  Date Time  Temp Pulse Resp B/P (MAP) Pulse Ox O2 Delivery O2 Flow Rate FiO2


 


5/24/19 12:01     97 Nasal Cannula 2.0 


 


5/24/19 08:06 97.7 104 18 144/64 (90)    





 97.7       








ROS:  No Nausea, No Chest Pain, No Abdominal Pain, No Increase Cough


General:  Alert, No acute distress


HEENT:  Other


Lungs:  Other (decrease bs)


Cardiovascular:  S1, S2


Abdomen:  Soft, Non-tender


Neuro Exam:  Alert, Oriented


Extremities:  No Edema


Skin:  Warm


Labs





Laboratory Tests








Test


 5/23/19


02:30


 


Prothrombin Time


 12.9 SEC


(11.7-14.0)


 


Prothromb Time International


Ratio 1.0 (0.8-1.1) 











Medications





Active Scripts








 Medications  Dose


 Route/Sig


 Max Daily Dose Days Date Category


 


 Prednisone 20 Mg


 Tablet  30 Mg


 PO DAILY


  5 3/6/19 Reported


 


 Levaquin


  (Levofloxacin)


 500 Mg Tablet  1 Tab


 PO DAILY


  10 3/6/19 Reported


 


 Ibuprofen 400 Mg


 Tablet  400 Mg


 PO PRN Q6HRS PRN


   2/9/18 Reported


 


 Duoneb 0.5-3(2.5)


 Mg/3 Ml


  (Albuterol/Ipratropium)


 3 Ml Ampul.neb  3 Ml


 NEB QID


   2/9/18 Reported


 


 Aspir-Low


  (Aspirin) 81 Mg


 Tablet.dr  1 Tab


 PO DAILY


   4/21/17 Reported


 


 Ventolin Hfa


 Inhaler


  (Albuterol


 Sulfate) 18 Gm


 Hfa.aer.ad  


 


   4/21/17 Reported


 


 Losartan-Hctz


 50-12.5 Mg Tab


  (Losartan/Hydrochlorothiazide)


 1 Each Tablet  1 Tab


 PO DAILY


   4/21/17 Reported


 


 Simvastatin 40 Mg


 Tablet  40 Mg


 PO HS


   6/4/15 Reported


 


 Clopidogrel


  (Clopidogrel


 Bisulfate) 75 Mg


 Tablet  75 Mg


 PO DAILY


   6/4/15 Reported











Impression


.





IMPRESSION:


1.  Acute-on-chronic hypoxic and hypercapnic respiratory failure/ suspect severe

COPD, EXTUBATED 5/15


2.  Abnormal chest x-ray with bilateral interstitial infiltrates, improving


3.  Possible a/c diastolic heart failure


4.  Abnormal D-dimer. clinical suspicion for PE/DVT low


5.  Azotemia.


6.  Septic shock pt with tachycardia lactic acidosis , resolved


7.   dysphagia


8.  Protein malnutrition POA moderate to severe


9.  S/P BRONCH ON 5/6 5/21 video


The swallowing mechanism was examined fluoroscopically in the lateral


projection while the patient ingested a variety of food materials mixed with


barium.  2.5 minutes of fluoroscopy time was utilized.  One video fluoroscopic


loop was recorded by a member of the month.





When ingesting the honey thickened material there was a delay in initiation of


pharyngeal peristalsis.  The pharyngeal peristalsis is weak with poor


epiglottic inversion.  There tends to be a moderate amount of vallecular and


piriform sinus residue.  Intermittent mild laryngeal penetration during


swallowing and delayed laryngeal penetration related to the residue eventually


resulted in extension of material down to the vocal cords and minimal


aspiration.  Similar findings of which pharyngeal peristalsis were evident


with the pudding consistency material.  The study was terminated at this point


due to these findings.











VENOUS DOPPLER 5/6


IMPRESSION:   There is no sonographic evidence of deep vein thrombosis in 


either lower extremity. 





IMPRESSION: 5/6


1. No CT evidence of central pulmonary emboli.


2. Emphysema.


3. Worsening tree-in-bud and interstitial opacities predominantly in the 


lower chest with mild confluent infiltrate posteriorly in the right lower 


lobe. An inflammatory/infectious etiology is most likely.





Plan


.


S/P PEG


PT VERY WEAK WILL NEED LTAC


NOT STRONG ENOUGH TO GO TO SNU


DECLINED BIPAP QHS


WILL CONTINUE SUPPORT


DIAGNOSTIC BRONCH SO FAR CULTURES NEGATIVE 


ANTIBX





PT OT


DVT AND GI PROPH


BD


OFF ABX, NO FURTHER FEVER











ERNESTINA MIGUEL MD              May 24, 2019 12:15

## 2019-05-24 NOTE — PDOC
Provider Note


Provider Note


he is ready for peg, no new sxs, exam same- off lovenox 48 hrs- placement after











GUS CHERY MD             May 24, 2019 08:21

## 2019-05-24 NOTE — NUR
SW following. Discussed with RN and Dr. Carmona, pt having a peg tube placed today, and will 
begin tube feedings. SW will continue to follow.

## 2019-05-25 VITALS — DIASTOLIC BLOOD PRESSURE: 61 MMHG | SYSTOLIC BLOOD PRESSURE: 109 MMHG

## 2019-05-25 VITALS — SYSTOLIC BLOOD PRESSURE: 115 MMHG | DIASTOLIC BLOOD PRESSURE: 61 MMHG

## 2019-05-25 VITALS — DIASTOLIC BLOOD PRESSURE: 47 MMHG | SYSTOLIC BLOOD PRESSURE: 80 MMHG

## 2019-05-25 VITALS — DIASTOLIC BLOOD PRESSURE: 62 MMHG | SYSTOLIC BLOOD PRESSURE: 102 MMHG

## 2019-05-25 VITALS — SYSTOLIC BLOOD PRESSURE: 103 MMHG | DIASTOLIC BLOOD PRESSURE: 61 MMHG

## 2019-05-25 VITALS — SYSTOLIC BLOOD PRESSURE: 102 MMHG | DIASTOLIC BLOOD PRESSURE: 57 MMHG

## 2019-05-25 LAB
% ATYL: 1 % (ref 0–0)
% BANDS: 40 % (ref 0–9)
% LYMPHS: 3 % (ref 24–48)
% METAS: 1 % (ref 0–0)
% MONOS: 1 % (ref 0–10)
% SEGS: 53 % (ref 35–66)
ANION GAP SERPL CALC-SCNC: 12 MMOL/L (ref 6–14)
ANISOCYTOSIS BLD QL SMEAR: SLIGHT
BASOPHILS # BLD AUTO: 0 X10^3/UL (ref 0–0.2)
BASOPHILS NFR BLD AUTO: 1 % (ref 0–3)
BASOPHILS NFR BLD: 0 % (ref 0–3)
BUN SERPL-MCNC: 43 MG/DL (ref 8–26)
CALCIUM SERPL-MCNC: 8.4 MG/DL (ref 8.5–10.1)
CHLORIDE SERPL-SCNC: 106 MMOL/L (ref 98–107)
CO2 SERPL-SCNC: 25 MMOL/L (ref 21–32)
CREAT SERPL-MCNC: 1 MG/DL (ref 0.7–1.3)
DOHLE BOD BLD QL SMEAR: (no result)
EOSINOPHIL NFR BLD: 0 % (ref 0–3)
EOSINOPHIL NFR BLD: 0 X10^3/UL (ref 0–0.7)
ERYTHROCYTE [DISTWIDTH] IN BLOOD BY AUTOMATED COUNT: 18.5 % (ref 11.5–14.5)
GFR SERPLBLD BASED ON 1.73 SQ M-ARVRAT: 73.9 ML/MIN
GLUCOSE SERPL-MCNC: 90 MG/DL (ref 70–99)
HCT VFR BLD CALC: 35.1 % (ref 39–53)
HGB BLD-MCNC: 11.5 G/DL (ref 13–17.5)
LYMPHOCYTES # BLD: 0.1 X10^3/UL (ref 1–4.8)
LYMPHOCYTES NFR BLD AUTO: 2 % (ref 24–48)
MAGNESIUM SERPL-MCNC: 2.3 MG/DL (ref 1.8–2.4)
MCH RBC QN AUTO: 29 PG (ref 25–35)
MCHC RBC AUTO-ENTMCNC: 33 G/DL (ref 31–37)
MCV RBC AUTO: 88 FL (ref 79–100)
MONO #: 0.1 X10^3/UL (ref 0–1.1)
MONOCYTES NFR BLD: 2 % (ref 0–9)
NEUT #: 6.1 X10^3UL (ref 1.8–7.7)
NEUTROPHILS NFR BLD AUTO: 95 % (ref 31–73)
OVALOCYTES BLD QL SMEAR: (no result)
PLATELET # BLD AUTO: 75 X10^3/UL (ref 140–400)
PLATELET # BLD EST: (no result) 10*3/UL
POIKILOCYTOSIS BLD QL SMEAR: SLIGHT
POTASSIUM SERPL-SCNC: 3.6 MMOL/L (ref 3.5–5.1)
RBC # BLD AUTO: 3.99 X10^6/UL (ref 4.3–5.7)
SODIUM SERPL-SCNC: 143 MMOL/L (ref 136–145)
WBC # BLD AUTO: 6.4 X10^3/UL (ref 4–11)

## 2019-05-25 RX ADMIN — IPRATROPIUM BROMIDE AND ALBUTEROL SULFATE SCH ML: .5; 3 SOLUTION RESPIRATORY (INHALATION) at 18:15

## 2019-05-25 RX ADMIN — CLOPIDOGREL BISULFATE SCH MG: 75 TABLET ORAL at 08:56

## 2019-05-25 RX ADMIN — NICOTINE SCH PATCH: 14 PATCH, EXTENDED RELEASE TOPICAL at 09:00

## 2019-05-25 RX ADMIN — IPRATROPIUM BROMIDE AND ALBUTEROL SULFATE SCH ML: .5; 3 SOLUTION RESPIRATORY (INHALATION) at 07:47

## 2019-05-25 RX ADMIN — FAMOTIDINE SCH MG: 10 INJECTION, SOLUTION INTRAVENOUS at 08:56

## 2019-05-25 RX ADMIN — ASPIRIN 81 MG SCH MG: 81 TABLET ORAL at 08:57

## 2019-05-25 RX ADMIN — IPRATROPIUM BROMIDE AND ALBUTEROL SULFATE SCH ML: .5; 3 SOLUTION RESPIRATORY (INHALATION) at 15:40

## 2019-05-25 RX ADMIN — IPRATROPIUM BROMIDE AND ALBUTEROL SULFATE SCH ML: .5; 3 SOLUTION RESPIRATORY (INHALATION) at 11:47

## 2019-05-25 RX ADMIN — FAMOTIDINE SCH MG: 20 TABLET ORAL at 21:00

## 2019-05-25 RX ADMIN — HYDROCODONE BITARTRATE AND ACETAMINOPHEN PRN TAB: 5; 325 TABLET ORAL at 16:12

## 2019-05-25 RX ADMIN — SIMVASTATIN SCH MG: 40 TABLET, FILM COATED ORAL at 21:00

## 2019-05-25 NOTE — PDOC
PULMONARY PROGRESS NOTES


Subjective


EXTUBATED 5/15


weak, sob better, no pain, has cough


Vitals





Vital Signs








  Date Time  Temp Pulse Resp B/P (MAP) Pulse Ox O2 Delivery O2 Flow Rate FiO2


 


5/25/19 07:58 97.7 71 16 115/61 (79) 93 Nasal Cannula 3.0 





 97.7       








ROS:  No Nausea, No Chest Pain, No Abdominal Pain, No Increase Cough


General:  Alert, No acute distress


HEENT:  Other


Lungs:  Other (decrease bs)


Cardiovascular:  S1, S2


Abdomen:  Soft, Non-tender


Neuro Exam:  Alert, Oriented


Extremities:  No Edema


Skin:  Warm


Medications





Active Scripts








 Medications  Dose


 Route/Sig


 Max Daily Dose Days Date Category


 


 Prednisone 20 Mg


 Tablet  30 Mg


 PO DAILY


  5 3/6/19 Reported


 


 Levaquin


  (Levofloxacin)


 500 Mg Tablet  1 Tab


 PO DAILY


  10 3/6/19 Reported


 


 Ibuprofen 400 Mg


 Tablet  400 Mg


 PO PRN Q6HRS PRN


   2/9/18 Reported


 


 Duoneb 0.5-3(2.5)


 Mg/3 Ml


  (Albuterol/Ipratropium)


 3 Ml Ampul.neb  3 Ml


 NEB QID


   2/9/18 Reported


 


 Aspir-Low


  (Aspirin) 81 Mg


 Tablet.dr  1 Tab


 PO DAILY


   4/21/17 Reported


 


 Ventolin Hfa


 Inhaler


  (Albuterol


 Sulfate) 18 Gm


 Hfa.aer.ad  


 


   4/21/17 Reported


 


 Losartan-Hctz


 50-12.5 Mg Tab


  (Losartan/Hydrochlorothiazide)


 1 Each Tablet  1 Tab


 PO DAILY


   4/21/17 Reported


 


 Simvastatin 40 Mg


 Tablet  40 Mg


 PO HS


   6/4/15 Reported


 


 Clopidogrel


  (Clopidogrel


 Bisulfate) 75 Mg


 Tablet  75 Mg


 PO DAILY


   6/4/15 Reported











Impression


.





IMPRESSION:


1.  Acute-on-chronic hypoxic and hypercapnic respiratory failure/ suspect severe

COPD, EXTUBATED 5/15


2.  Abnormal chest x-ray with bilateral interstitial infiltrates, improving


3.  Possible a/c diastolic heart failure


4.  Abnormal D-dimer. clinical suspicion for PE/DVT low


5.  Azotemia.


6.  Septic shock pt with tachycardia lactic acidosis , resolved


7.   dysphagia


8.  Protein malnutrition POA moderate to severe


9.  S/P BRONCH ON 5/6 5/21 video


The swallowing mechanism was examined fluoroscopically in the lateral


projection while the patient ingested a variety of food materials mixed with


barium.  2.5 minutes of fluoroscopy time was utilized.  One video fluoroscopic


loop was recorded by a member of the month.





When ingesting the honey thickened material there was a delay in initiation of


pharyngeal peristalsis.  The pharyngeal peristalsis is weak with poor


epiglottic inversion.  There tends to be a moderate amount of vallecular and


piriform sinus residue.  Intermittent mild laryngeal penetration during


swallowing and delayed laryngeal penetration related to the residue eventually


resulted in extension of material down to the vocal cords and minimal


aspiration.  Similar findings of which pharyngeal peristalsis were evident


with the pudding consistency material.  The study was terminated at this point


due to these findings.











VENOUS DOPPLER 5/6


IMPRESSION:   There is no sonographic evidence of deep vein thrombosis in 


either lower extremity. 





IMPRESSION: 5/6


1. No CT evidence of central pulmonary emboli.


2. Emphysema.


3. Worsening tree-in-bud and interstitial opacities predominantly in the 


lower chest with mild confluent infiltrate posteriorly in the right lower 


lobe. An inflammatory/infectious etiology is most likely.





Plan


.


S/P PEG


PT VERY WEAK WILL NEED LTAC


NOT STRONG ENOUGH TO GO TO SNU


DECLINED BIPAP QHS, the importance of use discussed


WILL CONTINUE SUPPORT


DIAGNOSTIC BRONCH SO FAR CULTURES NEGATIVE 


ANTIBX


PT OT


DVT AND GI PROPH


BD


monitor OFF ABX, NO FURTHER FEVER





discussed w rn, pt











TERESO NOYOLA MD               May 25, 2019 09:08

## 2019-05-25 NOTE — PDOC
Subjective:


Subjective:


tube feeds running well per nursing





Objective:


Vital Signs:





Vital Signs








  Date Time  Temp Pulse Resp B/P (MAP) Pulse Ox O2 Delivery O2 Flow Rate FiO2


 


5/25/19 12:20 98.2 58 16 80/47 (58) 90 Nasal Cannula 3.0 





 98.2       








Labs:





Laboratory Tests








Test


 5/25/19


11:20


 


White Blood Count


 6.4 x10^3/uL


(4.0-11.0)


 


Red Blood Count


 3.99 x10^6/uL


(4.30-5.70)


 


Hemoglobin


 11.5 g/dL


(13.0-17.5)


 


Hematocrit


 35.1 %


(39.0-53.0)


 


Mean Corpuscular Volume 88 fL () 


 


Mean Corpuscular Hemoglobin 29 pg (25-35) 


 


Mean Corpuscular Hemoglobin


Concent 33 g/dL


(31-37)


 


Red Cell Distribution Width


 18.5 %


(11.5-14.5)


 


Platelet Count


 75 x10^3/uL


(140-400)


 


Neutrophils (%) (Auto) 95 % (31-73) 


 


Lymphocytes (%) (Auto) 2 % (24-48) 


 


Monocytes (%) (Auto) 2 % (0-9) 


 


Eosinophils (%) (Auto) 0 % (0-3) 


 


Basophils (%) (Auto) 0 % (0-3) 


 


Neutrophils # (Auto)


 6.1 x10^3uL


(1.8-7.7)


 


Lymphocytes # (Auto)


 0.1 x10^3/uL


(1.0-4.8)


 


Monocytes # (Auto)


 0.1 x10^3/uL


(0.0-1.1)


 


Eosinophils # (Auto)


 0.0 x10^3/uL


(0.0-0.7)


 


Basophils # (Auto)


 0.0 x10^3/uL


(0.0-0.2)


 


Segmented Neutrophils % 53 % (35-66) 


 


Band Neutrophils % 40 % (0-9) 


 


Lymphocytes % 3 % (24-48) 


 


Atypical Lymphocytes %


(Manual) 1 % (0-0) 





 


Monocytes % 1 % (0-10) 


 


Basophils % 1 % (0-3) 


 


Metamyelocytes % 1 % (0-0) 


 


Dohle Bodies Mod 


 


Platelet Estimate


 Decreased


(ADEQUATE)


 


Poikilocytosis Slight 


 


Anisocytosis Slight 


 


Ovalocytes Few 


 


Sodium Level


 143 mmol/L


(136-145)


 


Potassium Level


 3.6 mmol/L


(3.5-5.1)


 


Chloride Level


 106 mmol/L


()


 


Carbon Dioxide Level


 25 mmol/L


(21-32)


 


Anion Gap 12 (6-14) 


 


Blood Urea Nitrogen


 43 mg/dL


(8-26)


 


Creatinine


 1.0 mg/dL


(0.7-1.3)


 


Estimated GFR


(Cockcroft-Gault) 73.9 





 


Glucose Level


 90 mg/dL


(70-99)


 


Lactic Acid Level


 2.1 mmol/L


(0.4-2.0)


 


Calcium Level


 8.4 mg/dL


(8.5-10.1)


 


Magnesium Level


 2.3 mg/dL


(1.8-2.4)











Physical Exam:


Physical Exam:


Abd: PEG in place.  Abdominal binder in place.  TTP





Assessment & Plan:


Assessment :


1) Dysphagia


2) Esophagitis


Plan:


He winced when I was examining his PEG tube  Per nursing, no issues.  Will 

continue to monitor











VÍCTOR PALMER MD               May 25, 2019 14:37

## 2019-05-25 NOTE — PDOC
Provider Note


Provider Note


awake, nonverbal, rate 110, looks dry as tube feeding slowly up- now 30 kcal/hr,

target 75- has resumed asa/plavix re his cad- still high risk for problems, d/w 

family- placement next, hopefully swallow will recover











GUS CHERY MD             May 25, 2019 11:24

## 2019-05-26 VITALS — DIASTOLIC BLOOD PRESSURE: 60 MMHG | SYSTOLIC BLOOD PRESSURE: 102 MMHG

## 2019-05-26 VITALS — SYSTOLIC BLOOD PRESSURE: 111 MMHG | DIASTOLIC BLOOD PRESSURE: 65 MMHG

## 2019-05-26 VITALS — DIASTOLIC BLOOD PRESSURE: 58 MMHG | SYSTOLIC BLOOD PRESSURE: 96 MMHG

## 2019-05-26 VITALS — SYSTOLIC BLOOD PRESSURE: 112 MMHG | DIASTOLIC BLOOD PRESSURE: 68 MMHG

## 2019-05-26 VITALS — SYSTOLIC BLOOD PRESSURE: 82 MMHG | DIASTOLIC BLOOD PRESSURE: 40 MMHG

## 2019-05-26 VITALS — SYSTOLIC BLOOD PRESSURE: 116 MMHG | DIASTOLIC BLOOD PRESSURE: 67 MMHG

## 2019-05-26 RX ADMIN — IPRATROPIUM BROMIDE AND ALBUTEROL SULFATE SCH ML: .5; 3 SOLUTION RESPIRATORY (INHALATION) at 07:19

## 2019-05-26 RX ADMIN — SIMVASTATIN SCH MG: 40 TABLET, FILM COATED ORAL at 21:01

## 2019-05-26 RX ADMIN — HYDROCODONE BITARTRATE AND ACETAMINOPHEN PRN TAB: 5; 325 TABLET ORAL at 21:02

## 2019-05-26 RX ADMIN — ASPIRIN 81 MG SCH MG: 81 TABLET ORAL at 09:01

## 2019-05-26 RX ADMIN — NICOTINE SCH PATCH: 14 PATCH, EXTENDED RELEASE TOPICAL at 09:00

## 2019-05-26 RX ADMIN — CLOPIDOGREL BISULFATE SCH MG: 75 TABLET ORAL at 09:02

## 2019-05-26 RX ADMIN — IPRATROPIUM BROMIDE AND ALBUTEROL SULFATE SCH ML: .5; 3 SOLUTION RESPIRATORY (INHALATION) at 11:28

## 2019-05-26 RX ADMIN — IPRATROPIUM BROMIDE AND ALBUTEROL SULFATE SCH ML: .5; 3 SOLUTION RESPIRATORY (INHALATION) at 19:42

## 2019-05-26 RX ADMIN — FAMOTIDINE SCH MG: 20 TABLET ORAL at 21:01

## 2019-05-26 RX ADMIN — IPRATROPIUM BROMIDE AND ALBUTEROL SULFATE SCH ML: .5; 3 SOLUTION RESPIRATORY (INHALATION) at 15:16

## 2019-05-26 RX ADMIN — HYDROCODONE BITARTRATE AND ACETAMINOPHEN PRN TAB: 5; 325 TABLET ORAL at 01:44

## 2019-05-26 NOTE — PDOC
PULMONARY PROGRESS NOTES


Subjective


EXTUBATED 5/15


on 02, is tired, no sob, cough


Vitals





Vital Signs








  Date Time  Temp Pulse Resp B/P (MAP) Pulse Ox O2 Delivery O2 Flow Rate FiO2


 


5/26/19 08:22 97.7 78 15 102/60 (74) 98 Nasal Cannula 3.0 





 97.7       








ROS:  No Nausea, No Chest Pain, No Abdominal Pain, No Increase Cough


General:  Alert, No acute distress


HEENT:  Other


Lungs:  Other (decrease bs)


Cardiovascular:  S1, S2


Abdomen:  Soft, Non-tender


Neuro Exam:  Alert, Oriented


Extremities:  No Edema


Skin:  Warm


Labs





Laboratory Tests








Test


 5/25/19


11:20


 


White Blood Count


 6.4 x10^3/uL


(4.0-11.0)


 


Red Blood Count


 3.99 x10^6/uL


(4.30-5.70)


 


Hemoglobin


 11.5 g/dL


(13.0-17.5)


 


Hematocrit


 35.1 %


(39.0-53.0)


 


Mean Corpuscular Volume 88 fL () 


 


Mean Corpuscular Hemoglobin 29 pg (25-35) 


 


Mean Corpuscular Hemoglobin


Concent 33 g/dL


(31-37)


 


Red Cell Distribution Width


 18.5 %


(11.5-14.5)


 


Platelet Count


 75 x10^3/uL


(140-400)


 


Neutrophils (%) (Auto) 95 % (31-73) 


 


Lymphocytes (%) (Auto) 2 % (24-48) 


 


Monocytes (%) (Auto) 2 % (0-9) 


 


Eosinophils (%) (Auto) 0 % (0-3) 


 


Basophils (%) (Auto) 0 % (0-3) 


 


Neutrophils # (Auto)


 6.1 x10^3uL


(1.8-7.7)


 


Lymphocytes # (Auto)


 0.1 x10^3/uL


(1.0-4.8)


 


Monocytes # (Auto)


 0.1 x10^3/uL


(0.0-1.1)


 


Eosinophils # (Auto)


 0.0 x10^3/uL


(0.0-0.7)


 


Basophils # (Auto)


 0.0 x10^3/uL


(0.0-0.2)


 


Segmented Neutrophils % 53 % (35-66) 


 


Band Neutrophils % 40 % (0-9) 


 


Lymphocytes % 3 % (24-48) 


 


Atypical Lymphocytes %


(Manual) 1 % (0-0) 





 


Monocytes % 1 % (0-10) 


 


Basophils % 1 % (0-3) 


 


Metamyelocytes % 1 % (0-0) 


 


Dohle Bodies Mod 


 


Platelet Estimate


 Decreased


(ADEQUATE)


 


Poikilocytosis Slight 


 


Anisocytosis Slight 


 


Ovalocytes Few 


 


Sodium Level


 143 mmol/L


(136-145)


 


Potassium Level


 3.6 mmol/L


(3.5-5.1)


 


Chloride Level


 106 mmol/L


()


 


Carbon Dioxide Level


 25 mmol/L


(21-32)


 


Anion Gap 12 (6-14) 


 


Blood Urea Nitrogen


 43 mg/dL


(8-26)


 


Creatinine


 1.0 mg/dL


(0.7-1.3)


 


Estimated GFR


(Cockcroft-Gault) 73.9 





 


Glucose Level


 90 mg/dL


(70-99)


 


Lactic Acid Level


 2.1 mmol/L


(0.4-2.0)


 


Calcium Level


 8.4 mg/dL


(8.5-10.1)


 


Magnesium Level


 2.3 mg/dL


(1.8-2.4)








Laboratory Tests








Test


 5/25/19


11:20


 


White Blood Count


 6.4 x10^3/uL


(4.0-11.0)


 


Red Blood Count


 3.99 x10^6/uL


(4.30-5.70)


 


Hemoglobin


 11.5 g/dL


(13.0-17.5)


 


Hematocrit


 35.1 %


(39.0-53.0)


 


Mean Corpuscular Volume 88 fL () 


 


Mean Corpuscular Hemoglobin 29 pg (25-35) 


 


Mean Corpuscular Hemoglobin


Concent 33 g/dL


(31-37)


 


Red Cell Distribution Width


 18.5 %


(11.5-14.5)


 


Platelet Count


 75 x10^3/uL


(140-400)


 


Neutrophils (%) (Auto) 95 % (31-73) 


 


Lymphocytes (%) (Auto) 2 % (24-48) 


 


Monocytes (%) (Auto) 2 % (0-9) 


 


Eosinophils (%) (Auto) 0 % (0-3) 


 


Basophils (%) (Auto) 0 % (0-3) 


 


Neutrophils # (Auto)


 6.1 x10^3uL


(1.8-7.7)


 


Lymphocytes # (Auto)


 0.1 x10^3/uL


(1.0-4.8)


 


Monocytes # (Auto)


 0.1 x10^3/uL


(0.0-1.1)


 


Eosinophils # (Auto)


 0.0 x10^3/uL


(0.0-0.7)


 


Basophils # (Auto)


 0.0 x10^3/uL


(0.0-0.2)


 


Segmented Neutrophils % 53 % (35-66) 


 


Band Neutrophils % 40 % (0-9) 


 


Lymphocytes % 3 % (24-48) 


 


Atypical Lymphocytes %


(Manual) 1 % (0-0) 





 


Monocytes % 1 % (0-10) 


 


Basophils % 1 % (0-3) 


 


Metamyelocytes % 1 % (0-0) 


 


Dohle Bodies Mod 


 


Platelet Estimate


 Decreased


(ADEQUATE)


 


Poikilocytosis Slight 


 


Anisocytosis Slight 


 


Ovalocytes Few 


 


Sodium Level


 143 mmol/L


(136-145)


 


Potassium Level


 3.6 mmol/L


(3.5-5.1)


 


Chloride Level


 106 mmol/L


()


 


Carbon Dioxide Level


 25 mmol/L


(21-32)


 


Anion Gap 12 (6-14) 


 


Blood Urea Nitrogen


 43 mg/dL


(8-26)


 


Creatinine


 1.0 mg/dL


(0.7-1.3)


 


Estimated GFR


(Cockcroft-Gault) 73.9 





 


Glucose Level


 90 mg/dL


(70-99)


 


Lactic Acid Level


 2.1 mmol/L


(0.4-2.0)


 


Calcium Level


 8.4 mg/dL


(8.5-10.1)


 


Magnesium Level


 2.3 mg/dL


(1.8-2.4)








Medications





Active Scripts








 Medications  Dose


 Route/Sig


 Max Daily Dose Days Date Category


 


 Prednisone 20 Mg


 Tablet  30 Mg


 PO DAILY


  5 3/6/19 Reported


 


 Levaquin


  (Levofloxacin)


 500 Mg Tablet  1 Tab


 PO DAILY


  10 3/6/19 Reported


 


 Ibuprofen 400 Mg


 Tablet  400 Mg


 PO PRN Q6HRS PRN


   2/9/18 Reported


 


 Duoneb 0.5-3(2.5)


 Mg/3 Ml


  (Albuterol/Ipratropium)


 3 Ml Ampul.neb  3 Ml


 NEB QID


   2/9/18 Reported


 


 Aspir-Low


  (Aspirin) 81 Mg


 Tablet.dr  1 Tab


 PO DAILY


   4/21/17 Reported


 


 Ventolin Hfa


 Inhaler


  (Albuterol


 Sulfate) 18 Gm


 Hfa.aer.ad  


 


   4/21/17 Reported


 


 Losartan-Hctz


 50-12.5 Mg Tab


  (Losartan/Hydrochlorothiazide)


 1 Each Tablet  1 Tab


 PO DAILY


   4/21/17 Reported


 


 Simvastatin 40 Mg


 Tablet  40 Mg


 PO HS


   6/4/15 Reported


 


 Clopidogrel


  (Clopidogrel


 Bisulfate) 75 Mg


 Tablet  75 Mg


 PO DAILY


   6/4/15 Reported











Impression


.





IMPRESSION:


1.  Acute-on-chronic hypoxic and hypercapnic respiratory failure/ suspect severe

COPD, EXTUBATED 5/15


2.  Abnormal chest x-ray with bilateral interstitial infiltrates, improving


3.  Possible a/c diastolic heart failure


4.  Abnormal D-dimer. clinical suspicion for PE/DVT low


5.  Azotemia.


6.  Septic shock pt with tachycardia lactic acidosis , resolved


7.   dysphagia


8.  Protein malnutrition POA moderate to severe


9.  S/P BRONCH ON 5/6 5/21 video


The swallowing mechanism was examined fluoroscopically in the lateral


projection while the patient ingested a variety of food materials mixed with


barium.  2.5 minutes of fluoroscopy time was utilized.  One video fluoroscopic


loop was recorded by a member of the month.





When ingesting the honey thickened material there was a delay in initiation of


pharyngeal peristalsis.  The pharyngeal peristalsis is weak with poor


epiglottic inversion.  There tends to be a moderate amount of vallecular and


piriform sinus residue.  Intermittent mild laryngeal penetration during


swallowing and delayed laryngeal penetration related to the residue eventually


resulted in extension of material down to the vocal cords and minimal


aspiration.  Similar findings of which pharyngeal peristalsis were evident


with the pudding consistency material.  The study was terminated at this point


due to these findings.











VENOUS DOPPLER 5/6


IMPRESSION:   There is no sonographic evidence of deep vein thrombosis in 


either lower extremity. 





IMPRESSION: 5/6


1. No CT evidence of central pulmonary emboli.


2. Emphysema.


3. Worsening tree-in-bud and interstitial opacities predominantly in the 


lower chest with mild confluent infiltrate posteriorly in the right lower 


lobe. An inflammatory/infectious etiology is most likely.





Plan


.


S/P PEG


PT VERY WEAK WILL NEED LTAC


NOT STRONG ENOUGH TO GO TO SNU


DECLINED BIPAP QHS, the importance of use discussed


WILL CONTINUE SUPPORT


DIAGNOSTIC BRONCH SO FAR CULTURES NEGATIVE 


ANTIBX


PT OT


DVT AND GI PROPH


BD


monitor OFF ABX, NO FURTHER FEVER





discussed w rn, pt











TERESO NOYOLA MD               May 26, 2019 09:02

## 2019-05-26 NOTE — PDOC
Subjective:


Subjective:


Tolerating tube feeds





Objective:


Vital Signs:





Vital Signs








  Date Time  Temp Pulse Resp B/P (MAP) Pulse Ox O2 Delivery O2 Flow Rate FiO2


 


5/26/19 08:22 97.7 78 15 102/60 (74) 98 Nasal Cannula 3.0 





 97.7       








Labs:





Laboratory Tests








Test


 5/25/19


11:20


 


White Blood Count


 6.4 x10^3/uL


(4.0-11.0)


 


Red Blood Count


 3.99 x10^6/uL


(4.30-5.70)


 


Hemoglobin


 11.5 g/dL


(13.0-17.5)


 


Hematocrit


 35.1 %


(39.0-53.0)


 


Mean Corpuscular Volume 88 fL () 


 


Mean Corpuscular Hemoglobin 29 pg (25-35) 


 


Mean Corpuscular Hemoglobin


Concent 33 g/dL


(31-37)


 


Red Cell Distribution Width


 18.5 %


(11.5-14.5)


 


Platelet Count


 75 x10^3/uL


(140-400)


 


Neutrophils (%) (Auto) 95 % (31-73) 


 


Lymphocytes (%) (Auto) 2 % (24-48) 


 


Monocytes (%) (Auto) 2 % (0-9) 


 


Eosinophils (%) (Auto) 0 % (0-3) 


 


Basophils (%) (Auto) 0 % (0-3) 


 


Neutrophils # (Auto)


 6.1 x10^3uL


(1.8-7.7)


 


Lymphocytes # (Auto)


 0.1 x10^3/uL


(1.0-4.8)


 


Monocytes # (Auto)


 0.1 x10^3/uL


(0.0-1.1)


 


Eosinophils # (Auto)


 0.0 x10^3/uL


(0.0-0.7)


 


Basophils # (Auto)


 0.0 x10^3/uL


(0.0-0.2)


 


Segmented Neutrophils % 53 % (35-66) 


 


Band Neutrophils % 40 % (0-9) 


 


Lymphocytes % 3 % (24-48) 


 


Atypical Lymphocytes %


(Manual) 1 % (0-0) 





 


Monocytes % 1 % (0-10) 


 


Basophils % 1 % (0-3) 


 


Metamyelocytes % 1 % (0-0) 


 


Dohle Bodies Mod 


 


Platelet Estimate


 Decreased


(ADEQUATE)


 


Poikilocytosis Slight 


 


Anisocytosis Slight 


 


Ovalocytes Few 


 


Sodium Level


 143 mmol/L


(136-145)


 


Potassium Level


 3.6 mmol/L


(3.5-5.1)


 


Chloride Level


 106 mmol/L


()


 


Carbon Dioxide Level


 25 mmol/L


(21-32)


 


Anion Gap 12 (6-14) 


 


Blood Urea Nitrogen


 43 mg/dL


(8-26)


 


Creatinine


 1.0 mg/dL


(0.7-1.3)


 


Estimated GFR


(Cockcroft-Gault) 73.9 





 


Glucose Level


 90 mg/dL


(70-99)


 


Lactic Acid Level


 2.1 mmol/L


(0.4-2.0)


 


Calcium Level


 8.4 mg/dL


(8.5-10.1)


 


Magnesium Level


 2.3 mg/dL


(1.8-2.4)











Physical Exam:


Abd: PEG in place.  Abdominal binder in place.





Assessment & Plan:


Assessment :


1) Dysphagia


2) Esophagitis


Plan:


Will continue to monitor











VÍCTOR PALMER MD               May 26, 2019 11:05

## 2019-05-26 NOTE — PDOC
Provider Note


Provider Note


vss, fair output, some inc in residual so TF slower, 1 dose reglan iv- d/w wife,

cont same











GUS CHERY MD             May 26, 2019 10:34

## 2019-05-27 VITALS — DIASTOLIC BLOOD PRESSURE: 58 MMHG | SYSTOLIC BLOOD PRESSURE: 119 MMHG

## 2019-05-27 VITALS — DIASTOLIC BLOOD PRESSURE: 48 MMHG | SYSTOLIC BLOOD PRESSURE: 100 MMHG

## 2019-05-27 VITALS — DIASTOLIC BLOOD PRESSURE: 48 MMHG | SYSTOLIC BLOOD PRESSURE: 86 MMHG

## 2019-05-27 VITALS — DIASTOLIC BLOOD PRESSURE: 46 MMHG | SYSTOLIC BLOOD PRESSURE: 100 MMHG

## 2019-05-27 VITALS — SYSTOLIC BLOOD PRESSURE: 148 MMHG | DIASTOLIC BLOOD PRESSURE: 54 MMHG

## 2019-05-27 VITALS — SYSTOLIC BLOOD PRESSURE: 92 MMHG | DIASTOLIC BLOOD PRESSURE: 45 MMHG

## 2019-05-27 VITALS — DIASTOLIC BLOOD PRESSURE: 56 MMHG | SYSTOLIC BLOOD PRESSURE: 99 MMHG

## 2019-05-27 RX ADMIN — HYDROCODONE BITARTRATE AND ACETAMINOPHEN PRN TAB: 5; 325 TABLET ORAL at 08:52

## 2019-05-27 RX ADMIN — CLOPIDOGREL BISULFATE SCH MG: 75 TABLET ORAL at 08:52

## 2019-05-27 RX ADMIN — BUDESONIDE SCH MG: 0.5 INHALANT RESPIRATORY (INHALATION) at 19:37

## 2019-05-27 RX ADMIN — HYDROCODONE BITARTRATE AND ACETAMINOPHEN PRN TAB: 5; 325 TABLET ORAL at 16:55

## 2019-05-27 RX ADMIN — IPRATROPIUM BROMIDE AND ALBUTEROL SULFATE SCH ML: .5; 3 SOLUTION RESPIRATORY (INHALATION) at 16:28

## 2019-05-27 RX ADMIN — IPRATROPIUM BROMIDE AND ALBUTEROL SULFATE SCH ML: .5; 3 SOLUTION RESPIRATORY (INHALATION) at 11:59

## 2019-05-27 RX ADMIN — ASPIRIN 81 MG SCH MG: 81 TABLET ORAL at 08:52

## 2019-05-27 RX ADMIN — BUDESONIDE SCH MG: 0.5 INHALANT RESPIRATORY (INHALATION) at 08:23

## 2019-05-27 RX ADMIN — IPRATROPIUM BROMIDE AND ALBUTEROL SULFATE SCH ML: .5; 3 SOLUTION RESPIRATORY (INHALATION) at 08:23

## 2019-05-27 RX ADMIN — SIMVASTATIN SCH MG: 40 TABLET, FILM COATED ORAL at 21:36

## 2019-05-27 RX ADMIN — FAMOTIDINE SCH MG: 20 TABLET ORAL at 21:36

## 2019-05-27 RX ADMIN — NICOTINE SCH PATCH: 7 PATCH, EXTENDED RELEASE TOPICAL at 08:52

## 2019-05-27 RX ADMIN — IPRATROPIUM BROMIDE AND ALBUTEROL SULFATE SCH ML: .5; 3 SOLUTION RESPIRATORY (INHALATION) at 19:37

## 2019-05-27 NOTE — PDOC
G I PROGRESS NOTE


Subjective


Some discomfort at PEG site (not unexpected).  Otherwise no complaints.


Objective


Tolerating tube feeding per staff.


Physical Exam


Lungs clear.


RRR


Abdomen soft, not distended.  PEG site dry.


Review of Relevant


I have reviewed the following items stephane (where applicable) has been applied.


Labs





Microbiology


5/5/19 Blood Culture - Final, Complete


         NO GROWTH AFTER 5 DAYS


5/6/19 AFB Specimen Processing Tissue - Final, Resulted


         


5/6/19 Acid Fast Bacilli Culture, Resulted


         Pending


5/6/19 Gram Stain - Final, Resulted


         


5/6/19 Fungal Culture - Preliminary, Resulted


         


5/6/19 Fungal Culture Result 1 - Preliminary, Resulted


Vitals/I & O





Vital Sign - Last 24 Hours








 5/26/19 5/26/19 5/26/19 5/26/19





 15:18 16:10 19:45 20:00


 


Temp  97.5  





  97.5  


 


Pulse  80  


 


Resp  16  


 


B/P (MAP)  112/68 (83)  


 


Pulse Ox 96 98 94 


 


O2 Delivery Nasal Cannula Nasal Cannula Nasal Cannula Nasal Cannula


 


O2 Flow Rate 3.0 3.0 3.0 3.0


 


    





    





 5/26/19 5/26/19 5/27/19 5/27/19





 20:04 23:30 03:05 07:00


 


Temp 98.9 97.5 97.9 98.8





 98.9 97.5 97.9 98.8


 


Pulse 91 77 75 99


 


Resp 18 18 20 24


 


B/P (MAP) 116/67 (83) 82/40 (54) 119/58 (78) 148/54 (85)


 


Pulse Ox 92 93 94 91


 


O2 Delivery Nasal Cannula Nasal Cannula Nasal Cannula Nasal Cannula


 


O2 Flow Rate 3.0 3.0 3.0 3.0


 


    





    





 5/27/19 5/27/19 5/27/19 5/27/19





 08:00 08:27 08:28 11:00


 


Temp    97.4





    97.4


 


Pulse    85


 


Resp    22


 


B/P (MAP)    86/48 (61)


 


Pulse Ox    97


 


O2 Delivery Nasal Cannula Nasal Cannula Nasal Cannula Nasal Cannula


 


O2 Flow Rate 4.0 3.0 3.0 4.0


 


    





    





 5/27/19 5/27/19  





 11:32 12:00  


 


Pulse 84   


 


B/P (MAP) 92/45 (61)   


 


Pulse Ox  97  


 


O2 Delivery  Nasal Cannula  


 


O2 Flow Rate  4.0  














Intake and Output   


 


 5/26/19 5/26/19 5/27/19





 15:00 23:00 07:00


 


Intake Total 120 ml 100 ml 0 ml


 


Output Total   900 ml


 


Balance 120 ml 100 ml -900 ml








Problem List


Problems


Medical Problems:


(1) Acute exacerbation of chronic obstructive pulmonary disease (COPD)


Status: Acute  





(2) Acute renal insufficiency


Status: Acute  





(3) Acute respiratory distress


Status: Acute  





(4) CO2 narcosis


Status: Acute  





(5) Elevated blood sugar level


Status: Acute  





(6) Elevated d-dimer


Status: Acute  





(7) Sepsis


Status: Acute  





Assessment


OP dysphagia, stable post-PEG.


Plan of Care:  Continue current Tx, Mgmt











LAUREEN AMARAL MD          May 27, 2019 14:47

## 2019-05-27 NOTE — NUR
Pt c/o abdominal pain and stating that he feels like he's "going to throw up." This RN 
checked residual, approx. 40mL retained. This RN dropped rate to 40mL per hour. Will 
continue to monitor.

## 2019-05-27 NOTE — PDOC
Provider Note


Provider Note


sleeping, vss, no new sxs- will add pulmicort- goal 75 kcal/hr enteral- 

placement pending











GUS CHERY MD             May 27, 2019 07:04

## 2019-05-27 NOTE — NUR
RN increased tube feed to 50mL/hr, which is goal rate. Approx 100 mL residual. Will continue 
to monitor.

## 2019-05-27 NOTE — NUR
this RN checked residual again, approx 60mL retained. Pt does not complain of any abdominal 
pain at this time.

## 2019-05-27 NOTE — PDOC
PULMONARY PROGRESS NOTES


Subjective


EXTUBATED 5/15


O2 NC


NOT ABLE TO LIFT LEGS OFF THE BED


Vitals





Vital Signs








  Date Time  Temp Pulse Resp B/P (MAP) Pulse Ox O2 Delivery O2 Flow Rate FiO2


 


5/27/19 16:28      Nasal Cannula 4.0 


 


5/27/19 15:00 98.7 79 22 99/56 (70) 90   





 98.7       








ROS:  No Nausea, No Chest Pain, No Abdominal Pain, No Increase Cough


General:  Alert, No acute distress


HEENT:  Other


Lungs:  Other (decrease bs)


Cardiovascular:  S1, S2


Abdomen:  Soft, Non-tender


Neuro Exam:  Alert, Oriented


Extremities:  No Edema


Skin:  Warm


Medications





Active Scripts








 Medications  Dose


 Route/Sig


 Max Daily Dose Days Date Category


 


 Prednisone 20 Mg


 Tablet  30 Mg


 PO DAILY


  5 3/6/19 Reported


 


 Levaquin


  (Levofloxacin)


 500 Mg Tablet  1 Tab


 PO DAILY


  10 3/6/19 Reported


 


 Ibuprofen 400 Mg


 Tablet  400 Mg


 PO PRN Q6HRS PRN


   2/9/18 Reported


 


 Duoneb 0.5-3(2.5)


 Mg/3 Ml


  (Albuterol/Ipratropium)


 3 Ml Ampul.neb  3 Ml


 NEB QID


   2/9/18 Reported


 


 Aspir-Low


  (Aspirin) 81 Mg


 Tablet.dr  1 Tab


 PO DAILY


   4/21/17 Reported


 


 Ventolin Hfa


 Inhaler


  (Albuterol


 Sulfate) 18 Gm


 Hfa.aer.ad  


 


   4/21/17 Reported


 


 Losartan-Hctz


 50-12.5 Mg Tab


  (Losartan/Hydrochlorothiazide)


 1 Each Tablet  1 Tab


 PO DAILY


   4/21/17 Reported


 


 Simvastatin 40 Mg


 Tablet  40 Mg


 PO HS


   6/4/15 Reported


 


 Clopidogrel


  (Clopidogrel


 Bisulfate) 75 Mg


 Tablet  75 Mg


 PO DAILY


   6/4/15 Reported











Impression


.





IMPRESSION:


1.  Acute-on-chronic hypoxic and hypercapnic respiratory failure/ suspect severe

COPD, EXTUBATED 5/15


2.  Abnormal chest x-ray with bilateral interstitial infiltrates, improving


3.  Possible a/c diastolic heart failure


4.  Abnormal D-dimer. clinical suspicion for PE/DVT low


5.  Azotemia.


6.  Septic shock pt with tachycardia lactic acidosis , resolved


7.   dysphagia


8.  Protein malnutrition POA moderate to severe


9.  S/P BRONCH ON 5/6 5/21 video


The swallowing mechanism was examined fluoroscopically in the lateral


projection while the patient ingested a variety of food materials mixed with


barium.  2.5 minutes of fluoroscopy time was utilized.  One video fluoroscopic


loop was recorded by a member of the month.





When ingesting the honey thickened material there was a delay in initiation of


pharyngeal peristalsis.  The pharyngeal peristalsis is weak with poor


epiglottic inversion.  There tends to be a moderate amount of vallecular and


piriform sinus residue.  Intermittent mild laryngeal penetration during


swallowing and delayed laryngeal penetration related to the residue eventually


resulted in extension of material down to the vocal cords and minimal


aspiration.  Similar findings of which pharyngeal peristalsis were evident


with the pudding consistency material.  The study was terminated at this point


due to these findings.











VENOUS DOPPLER 5/6


IMPRESSION:   There is no sonographic evidence of deep vein thrombosis in 


either lower extremity. 





IMPRESSION: 5/6


1. No CT evidence of central pulmonary emboli.


2. Emphysema.


3. Worsening tree-in-bud and interstitial opacities predominantly in the 


lower chest with mild confluent infiltrate posteriorly in the right lower 


lobe. An inflammatory/infectious etiology is most likely.





Plan


.


S/P PEG NOW ON TUBE FEEDING


PT VERY WEAK WILL NEED LTAC


NOT STRONG ENOUGH TO GO TO SNU


DECLINED BIPAP QHS, the importance of use discussed


WILL CONTINUE SUPPORT


DIAGNOSTIC BRONCH SO FAR CULTURES NEGATIVE 


ANTIBX


PT OT


DVT AND GI PROPH


BD


monitor OFF ABX, NO FURTHER FEVER











ERNESTINA MIGUEL MD              May 27, 2019 17:48

## 2019-05-28 VITALS — DIASTOLIC BLOOD PRESSURE: 46 MMHG | SYSTOLIC BLOOD PRESSURE: 95 MMHG

## 2019-05-28 VITALS — DIASTOLIC BLOOD PRESSURE: 42 MMHG | SYSTOLIC BLOOD PRESSURE: 92 MMHG

## 2019-05-28 VITALS — DIASTOLIC BLOOD PRESSURE: 47 MMHG | SYSTOLIC BLOOD PRESSURE: 87 MMHG

## 2019-05-28 VITALS — SYSTOLIC BLOOD PRESSURE: 102 MMHG | DIASTOLIC BLOOD PRESSURE: 59 MMHG

## 2019-05-28 VITALS — SYSTOLIC BLOOD PRESSURE: 102 MMHG | DIASTOLIC BLOOD PRESSURE: 52 MMHG

## 2019-05-28 VITALS — DIASTOLIC BLOOD PRESSURE: 41 MMHG | SYSTOLIC BLOOD PRESSURE: 74 MMHG

## 2019-05-28 VITALS — SYSTOLIC BLOOD PRESSURE: 88 MMHG | DIASTOLIC BLOOD PRESSURE: 45 MMHG

## 2019-05-28 VITALS — DIASTOLIC BLOOD PRESSURE: 44 MMHG | SYSTOLIC BLOOD PRESSURE: 84 MMHG

## 2019-05-28 LAB
APTT PPP: YELLOW S
BACTERIA #/AREA URNS HPF: 0 /HPF
BILIRUB UR QL STRIP: NEGATIVE
FIBRINOGEN PPP-MCNC: CLEAR MG/DL
NITRITE UR QL STRIP: NEGATIVE
PH UR STRIP: 6 [PH]
PROT UR STRIP-MCNC: 100 MG/DL
RBC #/AREA URNS HPF: (no result) /HPF (ref 0–2)
SQUAMOUS #/AREA URNS LPF: (no result) /LPF
UROBILINOGEN UR-MCNC: 4 MG/DL
WBC #/AREA URNS HPF: 0 /HPF (ref 0–4)

## 2019-05-28 RX ADMIN — IPRATROPIUM BROMIDE AND ALBUTEROL SULFATE SCH ML: .5; 3 SOLUTION RESPIRATORY (INHALATION) at 16:44

## 2019-05-28 RX ADMIN — MEROPENEM SCH MLS/HR: 500 INJECTION, POWDER, FOR SOLUTION INTRAVENOUS at 13:03

## 2019-05-28 RX ADMIN — HYDROCODONE BITARTRATE AND ACETAMINOPHEN PRN TAB: 5; 325 TABLET ORAL at 16:28

## 2019-05-28 RX ADMIN — MEROPENEM SCH MLS/HR: 500 INJECTION, POWDER, FOR SOLUTION INTRAVENOUS at 22:38

## 2019-05-28 RX ADMIN — BUDESONIDE SCH MG: 0.5 INHALANT RESPIRATORY (INHALATION) at 05:51

## 2019-05-28 RX ADMIN — SIMVASTATIN SCH MG: 40 TABLET, FILM COATED ORAL at 20:33

## 2019-05-28 RX ADMIN — IPRATROPIUM BROMIDE AND ALBUTEROL SULFATE SCH ML: .5; 3 SOLUTION RESPIRATORY (INHALATION) at 13:26

## 2019-05-28 RX ADMIN — HYDROCODONE BITARTRATE AND ACETAMINOPHEN PRN TAB: 5; 325 TABLET ORAL at 20:34

## 2019-05-28 RX ADMIN — NICOTINE SCH PATCH: 7 PATCH, EXTENDED RELEASE TOPICAL at 09:54

## 2019-05-28 RX ADMIN — IPRATROPIUM BROMIDE AND ALBUTEROL SULFATE SCH ML: .5; 3 SOLUTION RESPIRATORY (INHALATION) at 05:50

## 2019-05-28 RX ADMIN — IPRATROPIUM BROMIDE AND ALBUTEROL SULFATE SCH ML: .5; 3 SOLUTION RESPIRATORY (INHALATION) at 21:04

## 2019-05-28 RX ADMIN — BUDESONIDE SCH MG: 0.5 INHALANT RESPIRATORY (INHALATION) at 21:04

## 2019-05-28 RX ADMIN — HYDROCODONE BITARTRATE AND ACETAMINOPHEN PRN TAB: 5; 325 TABLET ORAL at 05:42

## 2019-05-28 RX ADMIN — ACETAMINOPHEN PRN MG: 325 TABLET, FILM COATED ORAL at 15:12

## 2019-05-28 RX ADMIN — FAMOTIDINE SCH MG: 20 TABLET ORAL at 20:33

## 2019-05-28 RX ADMIN — CLOPIDOGREL BISULFATE SCH MG: 75 TABLET ORAL at 09:54

## 2019-05-28 RX ADMIN — ASPIRIN 81 MG SCH MG: 81 TABLET ORAL at 09:55

## 2019-05-28 RX ADMIN — ACETAMINOPHEN PRN MG: 325 TABLET, FILM COATED ORAL at 03:01

## 2019-05-28 NOTE — NUR
Spoke with Dr. Carmona regarding plan of care, he was agreeable to this RN paging him 
through the office when the wife comes to visit today. Will continue to monitor.

## 2019-05-28 NOTE — PDOC
Provider Note


Provider Note


temp to 102, nnow down- exam same , poor inspiration, at risk re as

piration/atelectasis/hcap- will cxr, add cefdinir pending response- has tavera as

alt source of infection- COMFORT CARE/DNR











GUS CHERY MD             May 28, 2019 08:30

## 2019-05-28 NOTE — NUR
Wife, Tracy, arrived to the unit to visit . This RN paged Dr. Carmona through the 
office. Will continue to monitor. 

-------------------------------------------------------------------------------

Addendum: 05/28/19 at 1520 by ERIC BRUCE RN

-------------------------------------------------------------------------------

Still awaiting a call from Dr. Carmona regarding plan of care. Pt wife, Tracy, stated she 
was going to her house for "about an hour," and stated she would be back soon. Will continue 
to monitor.

## 2019-05-28 NOTE — RAD
Portable chest, 5/28/2019:

 

HISTORY: Fever

 

Comparison is made to a study from 5/18/2019. The heart size is normal. 

Moderate patchy infiltrates have developed in the lower chest, right 

greater than left. The findings suggest pneumonia. Obscuration of the 

right lateral costophrenic angle is probably due to infiltrate, although a

small amount of pleural fluid cannot be excluded. There is no evidence of 

pneumothorax.

 

IMPRESSION: Interval development of moderate basilar infiltrates, worse on

the right, suggesting pneumonia.

 

Electronically signed by: Rick Moritz, MD (5/28/2019 12:02 PM) [facility]

## 2019-05-28 NOTE — NUR
SW following Pt. NEHEMIAH faxed updated clinicals to [facility] and requested Carol to 
request for auth. Will continue to follow.

## 2019-05-28 NOTE — PDOC
PULMONARY PROGRESS NOTES


Subjective


EXTUBATED 5/15


O2 NC


VERY WEAK


FEVER 102/ NEW RLL PNEUMONIA/ SUSPECT ASPIRATION


Vitals





Vital Signs








  Date Time  Temp Pulse Resp B/P (MAP) Pulse Ox O2 Delivery O2 Flow Rate FiO2


 


5/28/19 10:31 97.5 94 15 102/59 (73) 91 Nasal Cannula 4.0 





 97.5       








ROS:  No Nausea, No Chest Pain, No Abdominal Pain, No Increase Cough


General:  No acute distress, Lethargic


HEENT:  Other


Lungs:  Other (decrease bs)


Cardiovascular:  S1, S2


Abdomen:  Soft, Non-tender


Neuro Exam:  Alert, Oriented


Extremities:  No Edema


Skin:  Warm


Medications





Active Scripts








 Medications  Dose


 Route/Sig


 Max Daily Dose Days Date Category


 


 Prednisone 20 Mg


 Tablet  30 Mg


 PO DAILY


  5 3/6/19 Reported


 


 Levaquin


  (Levofloxacin)


 500 Mg Tablet  1 Tab


 PO DAILY


  10 3/6/19 Reported


 


 Ibuprofen 400 Mg


 Tablet  400 Mg


 PO PRN Q6HRS PRN


   2/9/18 Reported


 


 Duoneb 0.5-3(2.5)


 Mg/3 Ml


  (Albuterol/Ipratropium)


 3 Ml Ampul.neb  3 Ml


 NEB QID


   2/9/18 Reported


 


 Aspir-Low


  (Aspirin) 81 Mg


 Tablet.dr  1 Tab


 PO DAILY


   4/21/17 Reported


 


 Ventolin Hfa


 Inhaler


  (Albuterol


 Sulfate) 18 Gm


 Hfa.aer.ad  


 


   4/21/17 Reported


 


 Losartan-Hctz


 50-12.5 Mg Tab


  (Losartan/Hydrochlorothiazide)


 1 Each Tablet  1 Tab


 PO DAILY


   4/21/17 Reported


 


 Simvastatin 40 Mg


 Tablet  40 Mg


 PO HS


   6/4/15 Reported


 


 Clopidogrel


  (Clopidogrel


 Bisulfate) 75 Mg


 Tablet  75 Mg


 PO DAILY


   6/4/15 Reported








Comments


5/28


NEW RLL CONSOLIDATION





Impression


.





IMPRESSION:


1.  Acute-on-chronic hypoxic and hypercapnic respiratory failure/ suspect severe

COPD, EXTUBATED 5/15


2.  FEVER 102/ NEW RLL PNEUMONIA/ SUSPECT ASPIRATION


3.  Possible a/c diastolic heart failure POA


4.  Encephalopathy


5.  Azotemia.


6.  Septic shock pt with tachycardia lactic acidosis , resolved


7.   dysphagia. s/p PEG


8.  Protein malnutrition POA moderate to severe


9.  S/P BRONCH ON 5/6 5/21 video


The swallowing mechanism was examined fluoroscopically in the lateral


projection while the patient ingested a variety of food materials mixed with


barium.  2.5 minutes of fluoroscopy time was utilized.  One video fluoroscopic


loop was recorded by a member of the month.





When ingesting the honey thickened material there was a delay in initiation of


pharyngeal peristalsis.  The pharyngeal peristalsis is weak with poor


epiglottic inversion.  There tends to be a moderate amount of vallecular and


piriform sinus residue.  Intermittent mild laryngeal penetration during


swallowing and delayed laryngeal penetration related to the residue eventually


resulted in extension of material down to the vocal cords and minimal


aspiration.  Similar findings of which pharyngeal peristalsis were evident


with the pudding consistency material.  The study was terminated at this point


due to these findings.











VENOUS DOPPLER 5/6


IMPRESSION:   There is no sonographic evidence of deep vein thrombosis in 


either lower extremity. 





IMPRESSION: 5/6


1. No CT evidence of central pulmonary emboli.


2. Emphysema.


3. Worsening tree-in-bud and interstitial opacities predominantly in the 


lower chest with mild confluent infiltrate posteriorly in the right lower 


lobe. An inflammatory/infectious etiology is most likely.





Plan


.


HIGH GRADE FEVER 102 YESTERDAY, NOW RLL PNEUMONIA. ADD MEROPENUM. CONSULT ID


S/P PEG NOW ON TUBE FEEDING


PT VERY WEAK WILL NEED LTAC


NOT STRONG ENOUGH TO GO TO SNU


DECLINED BIPAP QHS, 


WILL CONTINUE SUPPORT


DIAGNOSTIC BRONCH SO FAR CULTURES NEGATIVE 


ANTIBX


PT OT


DVT AND GI PROPH


BD











ZULMA KNOWLES MD                 May 28, 2019 10:42

## 2019-05-29 VITALS — SYSTOLIC BLOOD PRESSURE: 116 MMHG | DIASTOLIC BLOOD PRESSURE: 71 MMHG

## 2019-05-29 VITALS — DIASTOLIC BLOOD PRESSURE: 57 MMHG | SYSTOLIC BLOOD PRESSURE: 108 MMHG

## 2019-05-29 VITALS — DIASTOLIC BLOOD PRESSURE: 65 MMHG | SYSTOLIC BLOOD PRESSURE: 74 MMHG

## 2019-05-29 VITALS — DIASTOLIC BLOOD PRESSURE: 45 MMHG | SYSTOLIC BLOOD PRESSURE: 69 MMHG

## 2019-05-29 VITALS — DIASTOLIC BLOOD PRESSURE: 45 MMHG | SYSTOLIC BLOOD PRESSURE: 82 MMHG

## 2019-05-29 VITALS — DIASTOLIC BLOOD PRESSURE: 72 MMHG | SYSTOLIC BLOOD PRESSURE: 96 MMHG

## 2019-05-29 RX ADMIN — IPRATROPIUM BROMIDE AND ALBUTEROL SULFATE SCH ML: .5; 3 SOLUTION RESPIRATORY (INHALATION) at 20:16

## 2019-05-29 RX ADMIN — IPRATROPIUM BROMIDE AND ALBUTEROL SULFATE SCH ML: .5; 3 SOLUTION RESPIRATORY (INHALATION) at 08:11

## 2019-05-29 RX ADMIN — MEROPENEM SCH MLS/HR: 500 INJECTION, POWDER, FOR SOLUTION INTRAVENOUS at 05:48

## 2019-05-29 RX ADMIN — IPRATROPIUM BROMIDE AND ALBUTEROL SULFATE SCH ML: .5; 3 SOLUTION RESPIRATORY (INHALATION) at 11:29

## 2019-05-29 RX ADMIN — Medication PRN MG: at 13:46

## 2019-05-29 RX ADMIN — MEROPENEM SCH MLS/HR: 500 INJECTION, POWDER, FOR SOLUTION INTRAVENOUS at 21:25

## 2019-05-29 RX ADMIN — IPRATROPIUM BROMIDE AND ALBUTEROL SULFATE SCH ML: .5; 3 SOLUTION RESPIRATORY (INHALATION) at 15:24

## 2019-05-29 RX ADMIN — BUDESONIDE SCH MG: 0.5 INHALANT RESPIRATORY (INHALATION) at 08:11

## 2019-05-29 RX ADMIN — BUDESONIDE SCH MG: 0.5 INHALANT RESPIRATORY (INHALATION) at 20:16

## 2019-05-29 RX ADMIN — ASPIRIN 81 MG SCH MG: 81 TABLET ORAL at 08:59

## 2019-05-29 RX ADMIN — Medication PRN MG: at 22:08

## 2019-05-29 RX ADMIN — Medication PRN MG: at 01:51

## 2019-05-29 RX ADMIN — SCOPOLAMINE SCH PATCH: 1 PATCH, EXTENDED RELEASE TRANSDERMAL at 13:03

## 2019-05-29 RX ADMIN — Medication PRN MG: at 11:04

## 2019-05-29 RX ADMIN — MEROPENEM SCH MLS/HR: 500 INJECTION, POWDER, FOR SOLUTION INTRAVENOUS at 13:46

## 2019-05-29 RX ADMIN — Medication PRN MG: at 21:23

## 2019-05-29 RX ADMIN — FAMOTIDINE SCH MG: 20 TABLET ORAL at 21:28

## 2019-05-29 NOTE — PDOC
PULMONARY PROGRESS NOTES


Subjective


EXTUBATED 5/15


O2 NC


VERY WEAK/ NON VERBAL


FEVER 102/ NEW RLL PNEUMONIA/ SUSPECT ASPIRATION/ ABX STARTED 5/28, FEVER 

RESOLVED


Vitals





Vital Signs








  Date Time  Temp Pulse Resp B/P (MAP) Pulse Ox O2 Delivery O2 Flow Rate FiO2


 


5/29/19 08:12     90 Nasal Cannula 4.0 


 


5/29/19 07:00 97.4 57 16 108/57 (74)    





 97.4       








ROS:  No Nausea, No Chest Pain, No Abdominal Pain, No Increase Cough


General:  Lethargic


HEENT:  Other


Lungs:  Other (decrease bs)


Cardiovascular:  S1, S2


Abdomen:  Soft, Non-tender


Neuro Exam:  Oriented


Extremities:  No Edema


Skin:  Warm


Labs





Laboratory Tests








Test


 5/28/19


15:00


 


Urine Collection Type Unknown 


 


Urine Color Yellow 


 


Urine Clarity Clear 


 


Urine pH 6.0 


 


Urine Specific Gravity 1.020 


 


Urine Protein


 100 mg/dL


(NEG-TRACE)


 


Urine Glucose (UA)


 Negative mg/dL


(NEG)


 


Urine Ketones (Stick)


 Negative mg/dL


(NEG)


 


Urine Blood Small (NEG) 


 


Urine Nitrite Negative (NEG) 


 


Urine Bilirubin Negative (NEG) 


 


Urine Urobilinogen Dipstick


 4.0 mg/dL (0.2


mg/dL)


 


Urine Leukocyte Esterase Negative (NEG) 


 


Urine RBC 1-2 /HPF (0-2) 


 


Urine WBC 0 /HPF (0-4) 


 


Urine Squamous Epithelial


Cells Occ /LPF 





 


Urine Bacteria 0 /HPF (0-FEW) 


 


Urine Mucus Marked /LPF 








Laboratory Tests








Test


 5/28/19


15:00


 


Urine Collection Type Unknown 


 


Urine Color Yellow 


 


Urine Clarity Clear 


 


Urine pH 6.0 


 


Urine Specific Gravity 1.020 


 


Urine Protein


 100 mg/dL


(NEG-TRACE)


 


Urine Glucose (UA)


 Negative mg/dL


(NEG)


 


Urine Ketones (Stick)


 Negative mg/dL


(NEG)


 


Urine Blood Small (NEG) 


 


Urine Nitrite Negative (NEG) 


 


Urine Bilirubin Negative (NEG) 


 


Urine Urobilinogen Dipstick


 4.0 mg/dL (0.2


mg/dL)


 


Urine Leukocyte Esterase Negative (NEG) 


 


Urine RBC 1-2 /HPF (0-2) 


 


Urine WBC 0 /HPF (0-4) 


 


Urine Squamous Epithelial


Cells Occ /LPF 





 


Urine Bacteria 0 /HPF (0-FEW) 


 


Urine Mucus Marked /LPF 








Medications





Active Scripts








 Medications  Dose


 Route/Sig


 Max Daily Dose Days Date Category


 


 Prednisone 20 Mg


 Tablet  30 Mg


 PO DAILY


  5 3/6/19 Reported


 


 Levaquin


  (Levofloxacin)


 500 Mg Tablet  1 Tab


 PO DAILY


  10 3/6/19 Reported


 


 Ibuprofen 400 Mg


 Tablet  400 Mg


 PO PRN Q6HRS PRN


   2/9/18 Reported


 


 Duoneb 0.5-3(2.5)


 Mg/3 Ml


  (Albuterol/Ipratropium)


 3 Ml Ampul.neb  3 Ml


 NEB QID


   2/9/18 Reported


 


 Aspir-Low


  (Aspirin) 81 Mg


 Tablet.dr  1 Tab


 PO DAILY


   4/21/17 Reported


 


 Ventolin Hfa


 Inhaler


  (Albuterol


 Sulfate) 18 Gm


 Hfa.aer.ad  


 


   4/21/17 Reported


 


 Losartan-Hctz


 50-12.5 Mg Tab


  (Losartan/Hydrochlorothiazide)


 1 Each Tablet  1 Tab


 PO DAILY


   4/21/17 Reported


 


 Simvastatin 40 Mg


 Tablet  40 Mg


 PO HS


   6/4/15 Reported


 


 Clopidogrel


  (Clopidogrel


 Bisulfate) 75 Mg


 Tablet  75 Mg


 PO DAILY


   6/4/15 Reported








Comments


5/28


NEW RLL CONSOLIDATION





Impression


.





IMPRESSION:


1.  Acute-on-chronic hypoxic and hypercapnic respiratory failure/ suspect severe

COPD, EXTUBATED 5/15


2.  FEVER 102/ NEW RLL PNEUMONIA/ SUSPECT ASPIRATION 5/28


3.  Possible a/c diastolic heart failure POA


4.  Encephalopathy


5.  Azotemia.


6.  Septic shock pt with tachycardia lactic acidosis , resolved


7.   dysphagia. s/p PEG


8.  Protein malnutrition POA moderate to severe


9.  S/P BRONCH ON 5/6 5/21 video


The swallowing mechanism was examined fluoroscopically in the lateral


projection while the patient ingested a variety of food materials mixed with


barium.  2.5 minutes of fluoroscopy time was utilized.  One video fluoroscopic


loop was recorded by a member of the month.





When ingesting the honey thickened material there was a delay in initiation of


pharyngeal peristalsis.  The pharyngeal peristalsis is weak with poor


epiglottic inversion.  There tends to be a moderate amount of vallecular and


piriform sinus residue.  Intermittent mild laryngeal penetration during


swallowing and delayed laryngeal penetration related to the residue eventually


resulted in extension of material down to the vocal cords and minimal


aspiration.  Similar findings of which pharyngeal peristalsis were evident


with the pudding consistency material.  The study was terminated at this point


due to these findings.











VENOUS DOPPLER 5/6


IMPRESSION:   There is no sonographic evidence of deep vein thrombosis in 


either lower extremity. 





IMPRESSION: 5/6


1. No CT evidence of central pulmonary emboli.


2. Emphysema.


3. Worsening tree-in-bud and interstitial opacities predominantly in the 


lower chest with mild confluent infiltrate posteriorly in the right lower 


lobe. An inflammatory/infectious etiology is most likely.





Plan


.


CLINICALLY HAS DETERIORATED


ADDED MEROPENAM. CONSULTED ID


S/P PEG NOW ON TUBE FEEDING


PT VERY WEAK 


NOT STRONG ENOUGH TO GO TO SNU


DIAGNOSTIC BRONCH SO FAR CULTURES NEGATIVE 


ANTIBX


PT OT


DVT AND GI PROPH


BD


D/W RN/ FAMILY DISCUSSION ON GOING REGARDING COMFORT CARE











ZULMA KNOWLES MD                 May 29, 2019 09:22

## 2019-05-29 NOTE — PDOC
Provider Note


Provider Note


sleeping/comatose- rr 12 , pulse 110 irreg , may be new af- d/w wife , she 

understands preterminal state- will use roxanal prn, comfort caare- no labs, 

less meds, no new tests











GUS CHERY MD             May 29, 2019 08:44

## 2019-05-29 NOTE — PDOC
Objective:


Objective:


Reviewed primary note - comfort care, no labs, less meds, no new tests.


Per RN - PEG functioning at rate of 40.


Vital Signs:





                                   Vital Signs








  Date Time  Temp Pulse Resp B/P (MAP) Pulse Ox O2 Delivery O2 Flow Rate FiO2


 


5/29/19 08:12     90 Nasal Cannula 4.0 


 


5/29/19 07:00 97.4 57 16 108/57 (74)    





 97.4       








Labs:





Laboratory Tests








Test


 5/28/19


15:00


 


Urine Collection Type Unknown 


 


Urine Color Yellow 


 


Urine Clarity Clear 


 


Urine pH 6.0 


 


Urine Specific Gravity 1.020 


 


Urine Protein 100 mg/dL 


 


Urine Glucose (UA) Negative mg/dL 


 


Urine Ketones (Stick) Negative mg/dL 


 


Urine Blood Small 


 


Urine Nitrite Negative 


 


Urine Bilirubin Negative 


 


Urine Urobilinogen Dipstick 4.0 mg/dL 


 


Urine Leukocyte Esterase Negative 


 


Urine RBC 1-2 /HPF 


 


Urine WBC 0 /HPF 


 


Urine Squamous Epithelial


Cells Occ /LPF 





 


Urine Bacteria 0 /HPF 


 


Urine Mucus Marked /LPF 








Imaging:


CXR 5/28


IMPRESSION: Interval development of moderate basilar infiltrates, worse on the 

right, suggesting pneumonia.





PE:





GEN: ill


LUNGS: NC 4L


HEART: irregular


ABD: abd binder


NEURO/PSYCH: did not awaken





A/P:


Oropharyngeal dysphagia s/p PEG


Pneumonia, irregular HR





--


Note plans per primary note as above.











DONNA ENRIQUE         May 29, 2019 09:23

## 2019-05-29 NOTE — NUR
SW following Pt. Insurance denied LTAC and Physician note indicate comfort care, discussion 
with wife. RN to page Physician to request next disposition (Inpt Hospice, Hospice house vs 
home with hospice). Will continue to follow.

## 2019-05-29 NOTE — PDOC
Infectious Disease Note


Subjective:


Subjective


pt remains lethargic


arousable


fever pattern improved





ROS:


ROS


unable to obtain


d/w rn





Vital Signs:


Vital Signs





Vital Signs








  Date Time  Temp Pulse Resp B/P (MAP) Pulse Ox O2 Delivery O2 Flow Rate FiO2


 


5/29/19 08:12     90 Nasal Cannula 4.0 


 


5/29/19 07:00 97.4 57 16 108/57 (74)    





 97.4       











Physical Exam:


PHYSICAL EXAM


CONSTITUTIONAL: , Lethargic ,in nad, no family at bedside


HEENT:  Pupils are equal and reactive.  Normal conjunctivae.  on nasal o2


NECK:  Supple, 


LUNGS: Decreased breath sounds at bases


HEART:  S1, S2. No murmurs


ABDOMEN:  Soft, nontender, no guarding. peg tube site looks okay


GENITOURINARY:  Harmon is in place.


EXTREMITIES:  Without clubbing, cyanosis nor gross edema


SKIN:  Warm to touch without signs of rash.  He has numerous tattoos.


NEUROLOGIC: lethargic, arousable but does not respond to questions


piv looks okay





Medications:


Inpatient Meds:





Current Medications








 Medications


  (Trade)  Dose


 Ordered  Sig/Tin  Start Time


 Stop Time Status Last Admin


Dose Admin


 


 Acetaminophen


  (Tylenol)  650 mg  PRN Q6HRS  PRN  5/28/19 00:30


    5/28/19 15:12


650 MG


 


 Acetaminophen/


 Hydrocodone Bitart


  (Lortab 5/325)  1 tab  PRN Q6HRS  PRN  5/24/19 19:15


    5/28/19 20:34


1 TAB


 


 Albuterol/


 Ipratropium


  (Duoneb)  3 ml  QID  5/5/19 13:00


    5/29/19 08:11


3 ML


 


 Alprazolam


  (Xanax)  0.5 mg  PRN Q8HRS  PRN  5/25/19 11:30


    5/27/19 14:12


0.5 MG


 


 Amino Acids/


 Glycerin/


 Electrolytes  1,000 ml @ 


 125 mls/hr  Q8H  5/16/19 11:30


 5/23/19 08:34 DC 5/23/19 03:39


125 MLS/HR


 


 Artificial Tears


  (Artificial


 Tears)  1 drop  PRN Q1HR  PRN  5/6/19 17:15


    5/6/19 17:18


1 DROP


 


 Aspirin


  (Children'S


 Aspirin)  81 mg  [id number]  5/6/19 09:00


    5/29/19 08:59


81 MG


 


 Aspirin


  (Ecotrin)  81 mg  DAILY  5/5/19 11:00


 5/6/19 08:58 DC 5/5/19 18:59


81 MG


 


 Atropine Sulfate


  (ATROPINE 0.5mg


 SYRINGE)  0.5 mg  PRN Q5MIN  PRN  5/13/19 13:30


 5/17/19 08:47 DC  





 


 Azithromycin 500


 mg/Sodium Chloride  250 ml @ 


 250 mls/hr  DAILY  5/5/19 12:00


 5/15/19 08:58 DC 5/14/19 08:29


250 MLS/HR


 


 Barium Sulfate


  (Varibar Thin


 Liquid Apple)  148 gm  1X  ONCE  5/22/19 11:00


 5/22/19 11:01 DC 5/22/19 11:39


148 GM


 


 Bisacodyl


  (Dulcolax Supp)  10 mg  1X  ONCE  5/9/19 19:15


 5/9/19 19:16 DC 5/9/19 19:50


10 MG


 


 Budesonide


  (Pulmicort)  0.5 mg  RTBID  5/27/19 08:00


    5/29/19 08:11


0.5 MG


 


 Cefdinir


  (Omnicef Oral


 Susp)  500 mg  BID76  5/28/19 09:00


 5/28/19 13:54 DC 5/28/19 10:04


500 MG


 


 Ceftriaxone Sodium


  (Rocephin)  1 gm  Q24H  5/6/19 08:00


 5/15/19 08:59 DC 5/15/19 14:17


1 GM


 


 Clopidogrel


 Bisulfate


  (Plavix)  75 mg  DAILY  5/5/19 11:00


 5/29/19 08:42 DC 5/28/19 09:54


75 MG


 


 Dexmedetomidine


 HCl 200 mcg/


 Sodium Chloride  50 ml @ 0


 mls/hr  CONT  PRN  5/13/19 13:30


 5/17/19 08:45 DC 5/15/19 08:25


17.1 MLS/HR


 


 Enoxaparin Sodium


  (Lovenox 40mg


 Syringe)  40 mg  Q24H  5/6/19 09:00


 5/23/19 08:34 DC 5/22/19 09:15


40 MG


 


 Famotidine


  (Pepcid Vial)  20 mg  BID  5/6/19 21:00


 5/25/19 11:19 DC 5/25/19 08:56


20 MG


 


 Famotidine


  (Pepcid)  20 mg  QHS  5/25/19 21:00


    5/28/19 20:33


20 MG


 


 Fentanyl Citrate


  (Fentanyl 2ml


 Vial)  50 mcg  PRN Q5MIN  PRN  5/24/19 13:00


 5/24/19 18:00 DC  





 


 Fentanyl Citrate


  (Fentanyl 600


 Mcg/30 ml PCA)  600 mcg  STK-MED ONCE  5/6/19 12:00


 5/6/19 15:21 DC  





 


 Furosemide


  (Lasix)  40 mg  1X  ONCE  5/23/19 06:30


 5/23/19 06:31 DC 5/23/19 06:15


40 MG


 


 Haloperidol


 Lactate


  (Haldol Inj)  5 mg  PRN Q2HRS  PRN  5/15/19 15:00


 5/26/19 10:21 DC 5/21/19 23:46


5 MG


 


 Hydralazine HCl


  (Apresoline Inj)  10 mg  PRN Q4HRS  PRN  5/16/19 11:45


 5/25/19 11:19 DC 5/19/19 09:24


10 MG


 


 Hydrochlorothiazide


  (Microzide)  12.5 mg  DAILY  5/5/19 12:00


 5/11/19 11:09 DC 5/11/19 08:37


12.5 MG


 


 Info


  (CONTRAST GIVEN


 -- Rx MONITORING)  1 each  PRN DAILY  PRN  5/6/19 10:00


 5/8/19 09:59 DC  





 


 Iohexol


  (Omnipaque 350


 Mg/ml)  100 ml  1X  ONCE  5/6/19 10:00


 5/6/19 10:01 DC 5/6/19 13:50


100 ML


 


 Ketorolac


 Tromethamine


  (Toradol 15mg


 Vial)  15 mg  PRN Q6HRS  PRN  5/18/19 12:45


 5/23/19 12:44 DC 5/20/19 13:50


15 MG


 


 Lidocaine HCl


  (Lidocaine Pf 2%


 Vial)  5 ml  STK-MED ONCE  5/24/19 13:22


 5/24/19 13:23 DC  





 


 Lidocaine HCl


  (Xylocaine-Mpf


 1% 2ml Vial)  2 ml  1X PRN  PRN  5/24/19 13:00


 5/24/19 18:00 DC  





 


 Lorazepam


  (Ativan Inj)  1 mg  PRN Q8HRS  PRN  5/17/19 17:45


 5/25/19 11:19 DC 5/24/19 13:05


1 MG


 


 Losartan Potassium


  (Cozaar)  50 mg  DAILY  5/5/19 12:00


 5/12/19 08:22 DC 5/11/19 08:37


50 MG


 


 Meropenem 500 mg/


 Sodium Chloride  50 ml @ 


 100 mls/hr  Q8HRS  5/28/19 11:00


    5/29/19 05:48


100 MLS/HR


 


 Methylprednisolone


 Sodium Succinate


  (SOLU-Medrol


 40MG VIAL)  40 mg  DAILY07  5/20/19 08:00


 5/23/19 08:34 DC 5/22/19 06:10


40 MG


 


 Methylprednisolone


 Sodium Succinate


  (SOLU-Medrol


 125MG VIAL)  60 mg  BID76  5/14/19 18:00


 5/16/19 09:06 DC 5/15/19 18:09


60 MG


 


 Metoclopramide HCl


  (Reglan Vial)  5 mg  1X  ONCE  5/26/19 10:00


 5/26/19 10:01 DC 5/26/19 10:09


5 MG


 


 Midazolam HCl


  (Versed)  2 mg  PRN 1X  PRN  5/24/19 13:00


 5/24/19 18:00 DC  





 


 Morphine Sulfate


  (Roxanol Conc)  10 mg  PRN Q1HR  PRN  5/29/19 00:15


    5/29/19 01:51


10 MG


 


 Nicotine


  (Nicoderm Cq


 14mg)  1 patch  DAILY  5/21/19 09:00


 5/27/19 07:06 DC 5/24/19 10:13


1 PATCH


 


 Nicotine


  (Nicoderm Cq 7mg)  1 patch  DAILY  5/27/19 09:00


 5/29/19 08:42 DC 5/28/19 09:54


1 PATCH


 


 Non-Formulary


 Medication


  (Losartan/


 Hydrochlorothiazide


  (Losartan-Hctz


 50-12.5 Mg Tab))  1 tab  DAILY  5/6/19 09:00


   UNV  





 


 Norepinephrine


 Bitartrate  250 ml @ 


 1.875 mls/


 hr  CONT  PRN  5/5/19 14:00


 5/17/19 08:47 DC 5/5/19 23:03


24.375 MLS/HR


 


 Ondansetron HCl


  (Zofran)  4 mg  PRN Q6HRS  PRN  5/26/19 15:30


    5/26/19 15:31


4 MG


 


 Polyethylene


 Glycol


  (miraLAX PACKET)  17 gm  PRN DAILY  PRN  5/9/19 19:15


 5/25/19 11:19 DC  





 


 Potassium


 Chloride/Dextrose/


 Sod Cl  1,000 ml @ 


 30 mls/hr  Q24H  5/16/19 10:00


 5/18/19 17:02 DC 5/17/19 21:23


60 MLS/HR


 


 Propofol  20 ml @ As


 Directed  STK-MED ONCE  5/24/19 13:22


 5/24/19 13:23 DC  





 


 Ringer's Solution  1,000 ml @ 


 125 mls/hr  Q8H  5/24/19 12:55


 5/25/19 00:54 DC  





 


 Simvastatin


  (Zocor)  40 mg  QHS  5/5/19 21:00


 5/29/19 08:42 DC 5/28/19 20:33


40 MG


 


 Sodium Bicarbonate


  (Sodium Bicarb


 Adult 8.4% Syr)  100 meq  1X  ONCE  5/6/19 09:30


 5/6/19 09:33 DC 5/6/19 09:43


100 MEQ


 


 Sodium Chloride  500 ml @ 


 500 mls/hr  1X PRN  PRN  5/13/19 13:30


 5/17/19 09:26 DC  





 


 Vancomycin HCl  250 ml @ 


 250 mls/hr  1X  ONCE  5/5/19 07:45


 5/5/19 08:44 UNV  





 


 Vancomycin HCl 2


 gm/Sodium Chloride  500 ml @ 


 250 mls/hr  1X  ONCE  5/5/19 08:00


 5/5/19 09:59 DC 5/5/19 08:00


250 MLS/HR











Labs:


Lab





Laboratory Tests








Test


 5/28/19


15:00


 


Urine Collection Type Unknown 


 


Urine Color Yellow 


 


Urine Clarity Clear 


 


Urine pH 6.0 


 


Urine Specific Gravity 1.020 


 


Urine Protein


 100 mg/dL


(NEG-TRACE)


 


Urine Glucose (UA)


 Negative mg/dL


(NEG)


 


Urine Ketones (Stick)


 Negative mg/dL


(NEG)


 


Urine Blood Small (NEG) 


 


Urine Nitrite Negative (NEG) 


 


Urine Bilirubin Negative (NEG) 


 


Urine Urobilinogen Dipstick


 4.0 mg/dL (0.2


mg/dL)


 


Urine Leukocyte Esterase Negative (NEG) 


 


Urine RBC 1-2 /HPF (0-2) 


 


Urine WBC 0 /HPF (0-4) 


 


Urine Squamous Epithelial


Cells Occ /LPF 





 


Urine Bacteria 0 /HPF (0-FEW) 


 


Urine Mucus Marked /LPF 











Objective:


Assessment:


Fever .suspect Aspiration pneumonia


pattern improving








Dysphagia


Status post PEG tube placement





Acute respiratory failure





Encephalopathy etiology unclear





Penicillin allergy





Plan:


Plan of Care


cont  Merrem


Follow cultures and lab in the a.m.


Family is deciding for possibly comfort measures per team


Antibiotics can be discontinued depending on goals of treatment


Overall prognosis poor


D/w nursing











DAVID BANERJEE MD           May 29, 2019 10:21

## 2019-05-30 VITALS — DIASTOLIC BLOOD PRESSURE: 57 MMHG | SYSTOLIC BLOOD PRESSURE: 113 MMHG

## 2019-05-30 VITALS — DIASTOLIC BLOOD PRESSURE: 49 MMHG | SYSTOLIC BLOOD PRESSURE: 87 MMHG

## 2019-05-30 VITALS — SYSTOLIC BLOOD PRESSURE: 103 MMHG | DIASTOLIC BLOOD PRESSURE: 70 MMHG

## 2019-05-30 VITALS — DIASTOLIC BLOOD PRESSURE: 47 MMHG | SYSTOLIC BLOOD PRESSURE: 98 MMHG

## 2019-05-30 VITALS — SYSTOLIC BLOOD PRESSURE: 87 MMHG | DIASTOLIC BLOOD PRESSURE: 33 MMHG

## 2019-05-30 VITALS — SYSTOLIC BLOOD PRESSURE: 90 MMHG | DIASTOLIC BLOOD PRESSURE: 49 MMHG

## 2019-05-30 RX ADMIN — MEROPENEM SCH MLS/HR: 500 INJECTION, POWDER, FOR SOLUTION INTRAVENOUS at 06:58

## 2019-05-30 RX ADMIN — MEROPENEM SCH MLS/HR: 500 INJECTION, POWDER, FOR SOLUTION INTRAVENOUS at 21:17

## 2019-05-30 RX ADMIN — IPRATROPIUM BROMIDE AND ALBUTEROL SULFATE SCH ML: .5; 3 SOLUTION RESPIRATORY (INHALATION) at 15:18

## 2019-05-30 RX ADMIN — IPRATROPIUM BROMIDE AND ALBUTEROL SULFATE SCH ML: .5; 3 SOLUTION RESPIRATORY (INHALATION) at 11:19

## 2019-05-30 RX ADMIN — Medication PRN MG: at 08:40

## 2019-05-30 RX ADMIN — MEROPENEM SCH MLS/HR: 500 INJECTION, POWDER, FOR SOLUTION INTRAVENOUS at 13:44

## 2019-05-30 RX ADMIN — BUDESONIDE SCH MG: 0.5 INHALANT RESPIRATORY (INHALATION) at 06:00

## 2019-05-30 RX ADMIN — FAMOTIDINE SCH MG: 20 TABLET ORAL at 21:17

## 2019-05-30 RX ADMIN — IPRATROPIUM BROMIDE AND ALBUTEROL SULFATE SCH ML: .5; 3 SOLUTION RESPIRATORY (INHALATION) at 06:00

## 2019-05-30 RX ADMIN — IPRATROPIUM BROMIDE AND ALBUTEROL SULFATE SCH ML: .5; 3 SOLUTION RESPIRATORY (INHALATION) at 19:16

## 2019-05-30 RX ADMIN — Medication PRN MG: at 18:14

## 2019-05-30 NOTE — NUR
SW following pt. NEHEMIAH spoke with pt's daughter, Laurence, phone: [phone] via phone 
regarding dc plan. Discussed about hospice options and equipment needed. Daughter reported 
they will need a day or two to move furniture out of room and get the house ready. NEHEMIAH 
informed both daughter and pt's wife, everything can be arranged quickly (as early as today) 
and it will be a matter of when family will have house ready as pt is ready for next level 
of care. Daughter requested for hospital bed, Bed side table, suctioning, and oxygen. 
Daughter reported she couldn't remember the hospice agency they would like to use but will 
notify SW when she comes to visit pt today. NEHEMIAH left various hospice agency brochures and 
contact number with RN. Will continue to follow.

## 2019-05-30 NOTE — PDOC
Provider Note


Provider Note


remains awake a little, no distress, deep wet cough- pulse irreg 100s, lower 

output- d/w wife , she may want him home w/ hospice, she will discuss w/ 

daughter, rest of care same











GUS CHERY MD             May 30, 2019 08:00

## 2019-05-30 NOTE — PDOC
Objective:


Objective:


D/w RN - waiting to discuss comfort care with daughter.


Vital Signs:





                                   Vital Signs








  Date Time  Temp Pulse Resp B/P (MAP) Pulse Ox O2 Delivery O2 Flow Rate FiO2


 


5/30/19 09:40     97 Nasal Cannula 4.0 


 


5/30/19 07:05 99.1 77 22 87/33 (51)    





 99.1       











PE:





GEN: NAD, family present


NEURO/PSYCH: sleeping, not disturbed





A/P:


Oropharyngeal dysphagia s/p PEG


Pneumonia





--


Await family decisions.











DONNA ENRIQUE         May 30, 2019 11:28

## 2019-05-30 NOTE — PDOC
PULMONARY PROGRESS NOTES


Subjective


EXTUBATED 5/15


O2 NC


VERY WEAK/ MORE AWAKE


FEVER 102/ NEW RLL PNEUMONIA/ SUSPECT ASPIRATION/ ABX STARTED 5/28, FEVER 

RESOLVED


Vitals





Vital Signs








  Date Time  Temp Pulse Resp B/P (MAP) Pulse Ox O2 Delivery O2 Flow Rate FiO2


 


5/30/19 09:40     97 Nasal Cannula 4.0 


 


5/30/19 07:05 99.1 77 22 87/33 (51)    





 99.1       








ROS:  No Nausea, No Chest Pain, No Abdominal Pain, No Increase Cough


General:  Alert


HEENT:  Other


Lungs:  Other (decrease bs)


Cardiovascular:  S1, S2


Abdomen:  Soft, Non-tender


Neuro Exam:  Alert, Oriented


Extremities:  No Edema


Skin:  Warm


Labs





Laboratory Tests








Test


 5/28/19


15:00


 


Urine Collection Type Unknown 


 


Urine Color Yellow 


 


Urine Clarity Clear 


 


Urine pH 6.0 


 


Urine Specific Gravity 1.020 


 


Urine Protein


 100 mg/dL


(NEG-TRACE)


 


Urine Glucose (UA)


 Negative mg/dL


(NEG)


 


Urine Ketones (Stick)


 Negative mg/dL


(NEG)


 


Urine Blood Small (NEG) 


 


Urine Nitrite Negative (NEG) 


 


Urine Bilirubin Negative (NEG) 


 


Urine Urobilinogen Dipstick


 4.0 mg/dL (0.2


mg/dL)


 


Urine Leukocyte Esterase Negative (NEG) 


 


Urine RBC 1-2 /HPF (0-2) 


 


Urine WBC 0 /HPF (0-4) 


 


Urine Squamous Epithelial


Cells Occ /LPF 





 


Urine Bacteria 0 /HPF (0-FEW) 


 


Urine Mucus Marked /LPF 








Medications





Active Scripts








 Medications  Dose


 Route/Sig


 Max Daily Dose Days Date Category


 


 Prednisone 20 Mg


 Tablet  30 Mg


 PO DAILY


  5 3/6/19 Reported


 


 Levaquin


  (Levofloxacin)


 500 Mg Tablet  1 Tab


 PO DAILY


  10 3/6/19 Reported


 


 Ibuprofen 400 Mg


 Tablet  400 Mg


 PO PRN Q6HRS PRN


   2/9/18 Reported


 


 Duoneb 0.5-3(2.5)


 Mg/3 Ml


  (Albuterol/Ipratropium)


 3 Ml Ampul.neb  3 Ml


 NEB QID


   2/9/18 Reported


 


 Aspir-Low


  (Aspirin) 81 Mg


 Tablet.dr  1 Tab


 PO DAILY


   4/21/17 Reported


 


 Ventolin Hfa


 Inhaler


  (Albuterol


 Sulfate) 18 Gm


 Hfa.aer.ad  


 


   4/21/17 Reported


 


 Losartan-Hctz


 50-12.5 Mg Tab


  (Losartan/Hydrochlorothiazide)


 1 Each Tablet  1 Tab


 PO DAILY


   4/21/17 Reported


 


 Simvastatin 40 Mg


 Tablet  40 Mg


 PO HS


   6/4/15 Reported


 


 Clopidogrel


  (Clopidogrel


 Bisulfate) 75 Mg


 Tablet  75 Mg


 PO DAILY


   6/4/15 Reported








Comments


5/28


NEW RLL CONSOLIDATION





Impression


.





IMPRESSION:


1.  Acute-on-chronic hypoxic and hypercapnic respiratory failure/ suspect severe

COPD, EXTUBATED 5/15


2.  FEVER 102/ NEW RLL PNEUMONIA/ SUSPECT ASPIRATION 5/28


3.  Possible a/c diastolic heart failure POA


4.  Encephalopathy


5.  Azotemia.


6.  Septic shock pt with tachycardia lactic acidosis , resolved


7.   dysphagia. s/p PEG


8.  Protein malnutrition POA moderate to severe


9.  S/P BRONCH ON 5/6 5/21 video


The swallowing mechanism was examined fluoroscopically in the lateral


projection while the patient ingested a variety of food materials mixed with


barium.  2.5 minutes of fluoroscopy time was utilized.  One video fluoroscopic


loop was recorded by a member of the month.





When ingesting the honey thickened material there was a delay in initiation of


pharyngeal peristalsis.  The pharyngeal peristalsis is weak with poor


epiglottic inversion.  There tends to be a moderate amount of vallecular and


piriform sinus residue.  Intermittent mild laryngeal penetration during


swallowing and delayed laryngeal penetration related to the residue eventually


resulted in extension of material down to the vocal cords and minimal


aspiration.  Similar findings of which pharyngeal peristalsis were evident


with the pudding consistency material.  The study was terminated at this point


due to these findings.











VENOUS DOPPLER 5/6


IMPRESSION:   There is no sonographic evidence of deep vein thrombosis in 


either lower extremity. 





IMPRESSION: 5/6


1. No CT evidence of central pulmonary emboli.


2. Emphysema.


3. Worsening tree-in-bud and interstitial opacities predominantly in the 


lower chest with mild confluent infiltrate posteriorly in the right lower 


lobe. An inflammatory/infectious etiology is most likely.





Plan


.


MORE AWAKE TODAY


ADDED MEROPENAM. ID FOLLOWING


S/P PEG NOW ON TUBE FEEDING


PT VERY WEAK 


NOT STRONG ENOUGH TO GO TO SNU


DIAGNOSTIC BRONCH SO FAR CULTURES NEGATIVE 


ANTIBX


PT OT


DVT AND GI PROPH


BD


D/W RN/ FAMILY DISCUSSION ON GOING REGARDING COMFORT CARE/ HOSPICE. I CONCUR 

WITH THAT











ZULMA KNOWLES MD                 May 30, 2019 09:58

## 2019-05-31 VITALS — DIASTOLIC BLOOD PRESSURE: 44 MMHG | SYSTOLIC BLOOD PRESSURE: 95 MMHG

## 2019-05-31 VITALS — DIASTOLIC BLOOD PRESSURE: 53 MMHG | SYSTOLIC BLOOD PRESSURE: 114 MMHG

## 2019-05-31 VITALS — DIASTOLIC BLOOD PRESSURE: 58 MMHG | SYSTOLIC BLOOD PRESSURE: 127 MMHG

## 2019-05-31 VITALS — SYSTOLIC BLOOD PRESSURE: 105 MMHG | DIASTOLIC BLOOD PRESSURE: 50 MMHG

## 2019-05-31 VITALS — DIASTOLIC BLOOD PRESSURE: 66 MMHG | SYSTOLIC BLOOD PRESSURE: 126 MMHG

## 2019-05-31 RX ADMIN — IPRATROPIUM BROMIDE AND ALBUTEROL SULFATE SCH ML: .5; 3 SOLUTION RESPIRATORY (INHALATION) at 19:47

## 2019-05-31 RX ADMIN — FAMOTIDINE SCH MG: 20 TABLET ORAL at 20:53

## 2019-05-31 RX ADMIN — Medication PRN MG: at 10:44

## 2019-05-31 RX ADMIN — IPRATROPIUM BROMIDE AND ALBUTEROL SULFATE SCH ML: .5; 3 SOLUTION RESPIRATORY (INHALATION) at 06:54

## 2019-05-31 RX ADMIN — MEROPENEM SCH MLS/HR: 500 INJECTION, POWDER, FOR SOLUTION INTRAVENOUS at 05:56

## 2019-05-31 RX ADMIN — MEROPENEM SCH MLS/HR: 500 INJECTION, POWDER, FOR SOLUTION INTRAVENOUS at 22:23

## 2019-05-31 RX ADMIN — IPRATROPIUM BROMIDE AND ALBUTEROL SULFATE SCH ML: .5; 3 SOLUTION RESPIRATORY (INHALATION) at 15:17

## 2019-05-31 RX ADMIN — Medication PRN MG: at 19:30

## 2019-05-31 RX ADMIN — IPRATROPIUM BROMIDE AND ALBUTEROL SULFATE SCH ML: .5; 3 SOLUTION RESPIRATORY (INHALATION) at 11:20

## 2019-05-31 RX ADMIN — Medication PRN MG: at 05:56

## 2019-05-31 RX ADMIN — MEROPENEM SCH MLS/HR: 500 INJECTION, POWDER, FOR SOLUTION INTRAVENOUS at 13:40

## 2019-05-31 NOTE — PDOC
Provider Note


Provider Note


sleeping after roxanol, bp better, fair output- no more temp re cecilia

rem/pneumonia- pulse reg now, out of AF- famil;y wants hospice at home, in 

process- comfort care











GUS CHERY MD             May 31, 2019 08:12

## 2019-05-31 NOTE — PDOC
PULMONARY PROGRESS NOTES


Subjective


EXTUBATED 5/15


O2 NC


 WEAK/ MORE AWAKE


FEVER RESOLVED. NEW RLL PNEUMONIA/ SUSPECT ASPIRATION/ ABX STARTED 5/28, FEVER 

RESOLVED


Vitals





Vital Signs








  Date Time  Temp Pulse Resp B/P (MAP) Pulse Ox O2 Delivery O2 Flow Rate FiO2


 


5/31/19 11:20     96 Nasal Cannula 3.0 


 


5/31/19 11:00 98.7 74 20 105/50 (68)    





 98.7       








ROS:  No Nausea, No Chest Pain, No Abdominal Pain, No Increase Cough


General:  Alert


HEENT:  Other


Lungs:  Other (decrease bs)


Cardiovascular:  S1, S2


Abdomen:  Soft, Non-tender


Neuro Exam:  Alert, Oriented


Extremities:  No Edema


Skin:  Warm


Medications





Active Scripts








 Medications  Dose


 Route/Sig


 Max Daily Dose Days Date Category


 


 Prednisone 20 Mg


 Tablet  30 Mg


 PO DAILY


  5 3/6/19 Reported


 


 Levaquin


  (Levofloxacin)


 500 Mg Tablet  1 Tab


 PO DAILY


  10 3/6/19 Reported


 


 Ibuprofen 400 Mg


 Tablet  400 Mg


 PO PRN Q6HRS PRN


   2/9/18 Reported


 


 Duoneb 0.5-3(2.5)


 Mg/3 Ml


  (Albuterol/Ipratropium)


 3 Ml Ampul.neb  3 Ml


 NEB QID


   2/9/18 Reported


 


 Aspir-Low


  (Aspirin) 81 Mg


 Tablet.dr  1 Tab


 PO DAILY


   4/21/17 Reported


 


 Ventolin Hfa


 Inhaler


  (Albuterol


 Sulfate) 18 Gm


 Hfa.aer.ad  


 


   4/21/17 Reported


 


 Losartan-Hctz


 50-12.5 Mg Tab


  (Losartan/Hydrochlorothiazide)


 1 Each Tablet  1 Tab


 PO DAILY


   4/21/17 Reported


 


 Simvastatin 40 Mg


 Tablet  40 Mg


 PO HS


   6/4/15 Reported


 


 Clopidogrel


  (Clopidogrel


 Bisulfate) 75 Mg


 Tablet  75 Mg


 PO DAILY


   6/4/15 Reported








Comments


5/28


NEW RLL CONSOLIDATION





Impression


.





IMPRESSION:


1.  Acute-on-chronic hypoxic and hypercapnic respiratory failure/ suspect severe

COPD, EXTUBATED 5/15


2.  FEVER 102/ NEW RLL PNEUMONIA/ SUSPECT ASPIRATION 5/28


3.  Possible a/c diastolic heart failure POA


4.  Encephalopathy


5.  Azotemia.


6.  Septic shock pt with tachycardia lactic acidosis , resolved


7.   dysphagia. s/p PEG


8.  Protein malnutrition POA moderate to severe


9.  S/P BRONCH ON 5/6 5/21 video


The swallowing mechanism was examined fluoroscopically in the lateral


projection while the patient ingested a variety of food materials mixed with


barium.  2.5 minutes of fluoroscopy time was utilized.  One video fluoroscopic


loop was recorded by a member of the month.





When ingesting the honey thickened material there was a delay in initiation of


pharyngeal peristalsis.  The pharyngeal peristalsis is weak with poor


epiglottic inversion.  There tends to be a moderate amount of vallecular and


piriform sinus residue.  Intermittent mild laryngeal penetration during


swallowing and delayed laryngeal penetration related to the residue eventually


resulted in extension of material down to the vocal cords and minimal


aspiration.  Similar findings of which pharyngeal peristalsis were evident


with the pudding consistency material.  The study was terminated at this point


due to these findings.











VENOUS DOPPLER 5/6


IMPRESSION:   There is no sonographic evidence of deep vein thrombosis in 


either lower extremity. 





IMPRESSION: 5/6


1. No CT evidence of central pulmonary emboli.


2. Emphysema.


3. Worsening tree-in-bud and interstitial opacities predominantly in the 


lower chest with mild confluent infiltrate posteriorly in the right lower 


lobe. An inflammatory/infectious etiology is most likely.





Plan


.


MORE AWAKE TODAY


ON MEROPENAM. ID FOLLOWING/ FEVER RESOLVED


S/P PEG/ NOW ON TUBE FEEDING


PT  WEAK 


DIAGNOSTIC BRONCH SO FAR CULTURES NEGATIVE 


ANTIBX


PT OT


DVT AND GI PROPH


BD


D/W RN/ FAMILY DISCUSSION ON GOING REGARDING COMFORT CARE/ HOSPICE. I CONCUR 

WITH THAT











ZULMA KNOWLES MD                 May 31, 2019 11:36

## 2019-05-31 NOTE — NUR
NEHEMIAH following Pt. Spoke with Mirza at [facility] and they will be delivering equipment tomorrow 
morning to pt's residence. Mirza requested for a discharge around 1300 tomorrow. Hospice 
eval and tx order needed. RN notified.

-------------------------------------------------------------------------------

Addendum: 05/31/19 at 1615 by ZURI CERNA

-------------------------------------------------------------------------------

NEHEMIAH confirmed plan with pt's wife, Tracy via phone and she is agreeable.

## 2019-05-31 NOTE — NUR
SW following pt. NEHEMIAH received a phone call from Pt's wife requesting for assessment with the 
hospice agency with 'purple color'. NEHEMIAH confirmed with Mirza at [facility] their color is purple 
and pt's wife notified, agreeable. NEHEMIAH phoned and faxed referral to [facility], Phone: 
[phone], fax: [phone]. NEHEMIAH also informed Mirza about DME requested by family. Pt 
acceptance and admission pending. NEHEMIAH left a voice mail to pt's daughter, Laurence, Phone: 
[phone]. NEHEMIAH requested for hospice eval and tx order. Discussed with RN.

## 2019-06-01 VITALS — DIASTOLIC BLOOD PRESSURE: 53 MMHG | SYSTOLIC BLOOD PRESSURE: 111 MMHG

## 2019-06-01 VITALS — SYSTOLIC BLOOD PRESSURE: 106 MMHG | DIASTOLIC BLOOD PRESSURE: 68 MMHG

## 2019-06-01 VITALS — SYSTOLIC BLOOD PRESSURE: 97 MMHG | DIASTOLIC BLOOD PRESSURE: 53 MMHG

## 2019-06-01 RX ADMIN — Medication PRN MG: at 12:09

## 2019-06-01 RX ADMIN — Medication PRN MG: at 05:52

## 2019-06-01 RX ADMIN — MEROPENEM SCH MLS/HR: 500 INJECTION, POWDER, FOR SOLUTION INTRAVENOUS at 05:48

## 2019-06-01 RX ADMIN — IPRATROPIUM BROMIDE AND ALBUTEROL SULFATE SCH ML: .5; 3 SOLUTION RESPIRATORY (INHALATION) at 09:31

## 2019-06-01 RX ADMIN — IPRATROPIUM BROMIDE AND ALBUTEROL SULFATE SCH ML: .5; 3 SOLUTION RESPIRATORY (INHALATION) at 11:44

## 2019-06-01 RX ADMIN — Medication PRN MG: at 07:33

## 2019-06-01 RX ADMIN — SCOPOLAMINE SCH PATCH: 1 PATCH, EXTENDED RELEASE TRANSDERMAL at 09:12

## 2019-06-01 RX ADMIN — Medication PRN MG: at 13:52

## 2019-06-01 NOTE — NUR
Spoke with Simone from [facility] this afternoon. All equipment had been delivered to 
patient's home. Dr. Byrd notified and completed discharge paperwork. [facility] notified 
of patient discharge time at 1400.

## 2019-06-01 NOTE — SNU/HH DC
DISCHARGE ORDERS


DISCHARGE INFORMATION:


DISCHARGE DATE:  Jun 1, 2019


FINAL DIAGNOSIS


Problems


Medical Problems:


(1) Acute exacerbation of chronic obstructive pulmonary disease (COPD)


Status: Acute  





(2) Acute renal insufficiency


Status: Acute  





(3) Acute respiratory distress


Status: Acute  





(4) CO2 narcosis


Status: Acute  





(5) Elevated blood sugar level


Status: Acute  





(6) Elevated d-dimer


Status: Acute  





(7) Sepsis


Status: Acute  








CONDITION ON DISCHARGE:  Guarded





CODE STATUS:


Code Status:  DNR/DNI





SKILLED NURSING:


SNF STAY <30 DAYS:  No





HOSPICE:


HOSPICE:  Yes


HOSPICE EVAL & TREAT:  Yes





LTAC:


ADMIT TO LTAC:  No





POST DISCHARGE ORDERS:


ACTIVITY ORDERS:  Activity as tolerated


WEIGHT BEARING STATUS:  As tolerated


BATHING ORDERS:  No Tub Bath until see 


DIET AFTER DISCHARGE:  NPO


WOUND/INCISION CARE:  May get incision wet





CHECKS AFTER DISCHARGE:


CHECKS AFTER DISCHARGE:  Check your Temp as needed





TREATMENT/EQUIPMENT ORDERS:


ADAPTIVE EQUIPMENT NEEDED:  None





DISCHARGE MEDICATIONS:


Home Meds


Reported Medications


Ipratropium/Albuterol Sulfate (DUONEB 0.5-3(2.5) MG/3 ML) 3 Ml Ampul.neb, 3 ML 

NEB QID, EACH


   2/9/18


Albuterol Sulfate (VENTOLIN HFA INHALER) 18 Gm Hfa.aer.ad, #18


   4/21/17


Discontinued Reported Medications


Prednisone (PREDNISONE) 20 Mg Tablet, 30 MG PO DAILY for inflammation for 5 

Days, #8 TAB


   3/6/19


Levofloxacin (LEVAQUIN) 500 Mg Tablet, 1 TAB PO DAILY for infection for 10 Days,

#10 TAB


   3/6/19


Ibuprofen (IBUPROFEN) 400 Mg Tablet, 400 MG PO PRN Q6HRS PRN for INFLAMMATION, 

TAB


   2/9/18


Aspirin (ASPIR-LOW) 81 Mg Tablet., 1 TAB PO DAILY, #30 TAB 3 Refills


   4/21/17


Losartan/Hydrochlorothiazide (LOSARTAN-HCTZ 50-12.5 MG TAB) 1 Each Tablet, 1 TAB

PO DAILY, #30 TAB 5 Refills


   4/21/17


Simvastatin (SIMVASTATIN) 40 Mg Tablet, 40 MG PO HS for FOR CHOLESTEROL, #30 TAB

0 Refills


   6/4/15


Clopidogrel Bisulfate (CLOPIDOGREL) 75 Mg Tablet, 75 MG PO DAILY for TO PREVENT 

BLOOD CLOTS, #30 TAB 0 Refills


   6/4/15











JOHNATHAN EAST MD               Jun 1, 2019 12:49

## 2019-06-01 NOTE — NUR
Discharge Note:



MARGOT REHMAN [facility]



Discharge instructions and discharge home medications reviewed with Home Care 
Nurse/Telephone and a copy given. All questions have been answered and understanding 
verbalized. 



The following instructions and handouts were given: Diet, activity, medication list and 
follow up instructions provided to [facility] hosp[facility], who is meeting the patient upon arrival 
home. 



Discontinued lines and drains: Peripheral IV discontinued with catheter intact. Harmon 
catheter maintained for comfort measures. 



Patient discharged to Home w/services [facility] with Ambulance Personnel via Stretcher.

## 2019-06-02 NOTE — PN
DATE:  06/01/2019



LOCATION OF THE PATIENT:  Room 648. 



SUBJECTIVE:  The patient is awake and alert.  He denies any problems with

breathing and is wanting to get out of bed and nephew was present in the room.



OBJECTIVE:

VITAL SIGNS:  Stable.  He is afebrile.

CHEST:  Reveals decreased breath sounds bilaterally.

HEART:  Regular rate and rhythm.

ABDOMEN:  Benign.

EXTREMITIES:  Unremarkable.  Tube feedings are ongoing.



IMPRESSION:  

1.  New right lower lobe pneumonia, likely aspiration related with improving

fever curve with no temperatures now in the last 24 hours or longer.

2.  Acute-on-chronic hypoxic and hypercapnic respiratory failure, status post

septic shock with lactic acidosis.  

3.  Dysphagia with percutaneous endoscopic gastrostomy tube feeding.  

4.  Protein-calorie malnutrition, moderate-to-severe.



PLAN:  We will continue present supportive care.  Apparently, family is

considering home with hospice and I am not sure where that is at this point in

time; however, the patient currently is getting meropenem and we will continue

that and tube feedings as well as his other medications.

 



______________________________

JOHNATHAN EAST MD



DR:  VARSHA/skylar  JOB#:  [id number] / [id number]

DD:  06/01/2019 08:37  DT:  06/02/2019 02:34

## 2019-07-02 NOTE — PDOC
Infectious Disease Note


Subjective:


Subjective


Reason for Reconsultation


Fever





Referring physician Dr. Davenport





Mr. Jeffrey is well-known to our team from this admission





Seen initial ID consult from May 16, 2019 for full details


Last follow-up ID note ID from May 17 2019 for details


Patient is unable to give history 


History obtained from staff and medical records


Patient underwent PEG tube placement for dysphagia


Patient was febrile at 102F yesterday 


Tolerating tube feedings well


on 2 l O2 by nasal cannula


Patient was started on empiric meropenem





ROS:


ROS


Unable to obtain


Discussed with RN





Vital Signs:


Vital Signs





Vital Signs








  Date Time  Temp Pulse Resp B/P (MAP) Pulse Ox O2 Delivery O2 Flow Rate FiO2


 


5/28/19 13:27     95 Nasal Cannula 4.0 


 


5/28/19 10:31 97.5 94 15 102/59 (73)    





 97.5       











Physical Exam:


PHYSICAL EXAM


CONSTITUTIONAL: , Lethargic ,in nad, no family at bedside


HEENT:  Pupils are equal and reactive.  Normal conjunctivae.  on nasal o2


NECK:  Supple, 


LUNGS: Decreased breath sounds at bases


HEART:  S1, S2. No murmurs


ABDOMEN:  Soft, nontender, no guarding. peg tube site looks okay


GENITOURINARY:  Harmon is in place.


EXTREMITIES:  Without clubbing, cyanosis nor gross edema


SKIN:  Warm to touch without signs of rash.  He has numerous tattoos.


NEUROLOGIC: lethargic, arousable but does not respond to questions


piv looks okay





Medications:


Inpatient Meds:





Current Medications








 Medications


  (Trade)  Dose


 Ordered  Sig/Tin  Start Time


 Stop Time Status Last Admin


Dose Admin


 


 Acetaminophen


  (Tylenol)  650 mg  PRN Q6HRS  PRN  5/28/19 00:30


    5/28/19 03:01


650 MG


 


 Acetaminophen/


 Hydrocodone Bitart


  (Lortab 5/325)  1 tab  PRN Q6HRS  PRN  5/24/19 19:15


    5/28/19 05:42


1 TAB


 


 Albuterol/


 Ipratropium


  (Duoneb)  3 ml  QID  5/5/19 13:00


    5/28/19 13:26


3 ML


 


 Alprazolam


  (Xanax)  0.5 mg  PRN Q8HRS  PRN  5/25/19 11:30


    5/27/19 14:12


0.5 MG


 


 Amino Acids/


 Glycerin/


 Electrolytes  1,000 ml @ 


 125 mls/hr  Q8H  5/16/19 11:30


 5/23/19 08:34 DC 5/23/19 03:39


125 MLS/HR


 


 Artificial Tears


  (Artificial


 Tears)  1 drop  PRN Q1HR  PRN  5/6/19 17:15


    5/6/19 17:18


1 DROP


 


 Aspirin


  (Children'S


 Aspirin)  81 mg  [id number]  5/6/19 09:00


    5/28/19 09:55


81 MG


 


 Aspirin


  (Ecotrin)  81 mg  DAILY  5/5/19 11:00


 5/6/19 08:58 DC 5/5/19 18:59


81 MG


 


 Atropine Sulfate


  (ATROPINE 0.5mg


 SYRINGE)  0.5 mg  PRN Q5MIN  PRN  5/13/19 13:30


 5/17/19 08:47 DC  





 


 Azithromycin 500


 mg/Sodium Chloride  250 ml @ 


 250 mls/hr  DAILY  5/5/19 12:00


 5/15/19 08:58 DC 5/14/19 08:29


250 MLS/HR


 


 Barium Sulfate


  (Varibar Thin


 Liquid Apple)  148 gm  1X  ONCE  5/22/19 11:00


 5/22/19 11:01 DC 5/22/19 11:39


148 GM


 


 Bisacodyl


  (Dulcolax Supp)  10 mg  1X  ONCE  5/9/19 19:15


 5/9/19 19:16 DC 5/9/19 19:50


10 MG


 


 Budesonide


  (Pulmicort)  0.5 mg  RTBID  5/27/19 08:00


    5/28/19 05:51


0.5 MG


 


 Cefdinir


  (Omnicef Oral


 Susp)  500 mg  BID76  5/28/19 09:00


    5/28/19 10:04


500 MG


 


 Ceftriaxone Sodium


  (Rocephin)  1 gm  Q24H  5/6/19 08:00


 5/15/19 08:59 DC 5/15/19 14:17


1 GM


 


 Clopidogrel


 Bisulfate


  (Plavix)  75 mg  DAILY  5/5/19 11:00


    5/28/19 09:54


75 MG


 


 Dexmedetomidine


 HCl 200 mcg/


 Sodium Chloride  50 ml @ 0


 mls/hr  CONT  PRN  5/13/19 13:30


 5/17/19 08:45 DC 5/15/19 08:25


17.1 MLS/HR


 


 Enoxaparin Sodium


  (Lovenox 40mg


 Syringe)  40 mg  Q24H  5/6/19 09:00


 5/23/19 08:34 DC 5/22/19 09:15


40 MG


 


 Famotidine


  (Pepcid Vial)  20 mg  BID  5/6/19 21:00


 5/25/19 11:19 DC 5/25/19 08:56


20 MG


 


 Famotidine


  (Pepcid)  20 mg  QHS  5/25/19 21:00


    5/27/19 21:36


20 MG


 


 Fentanyl Citrate


  (Fentanyl 2ml


 Vial)  50 mcg  PRN Q5MIN  PRN  5/24/19 13:00


 5/24/19 18:00 DC  





 


 Fentanyl Citrate


  (Fentanyl 600


 Mcg/30 ml PCA)  600 mcg  STK-MED ONCE  5/6/19 12:00


 5/6/19 15:21 DC  





 


 Furosemide


  (Lasix)  40 mg  1X  ONCE  5/23/19 06:30


 5/23/19 06:31 DC 5/23/19 06:15


40 MG


 


 Haloperidol


 Lactate


  (Haldol Inj)  5 mg  PRN Q2HRS  PRN  5/15/19 15:00


 5/26/19 10:21 DC 5/21/19 23:46


5 MG


 


 Hydralazine HCl


  (Apresoline Inj)  10 mg  PRN Q4HRS  PRN  5/16/19 11:45


 5/25/19 11:19 DC 5/19/19 09:24


10 MG


 


 Hydrochlorothiazide


  (Microzide)  12.5 mg  DAILY  5/5/19 12:00


 5/11/19 11:09 DC 5/11/19 08:37


12.5 MG


 


 Info


  (CONTRAST GIVEN


 -- Rx MONITORING)  1 each  PRN DAILY  PRN  5/6/19 10:00


 5/8/19 09:59 DC  





 


 Iohexol


  (Omnipaque 350


 Mg/ml)  100 ml  1X  ONCE  5/6/19 10:00


 5/6/19 10:01 DC 5/6/19 13:50


100 ML


 


 Ketorolac


 Tromethamine


  (Toradol 15mg


 Vial)  15 mg  PRN Q6HRS  PRN  5/18/19 12:45


 5/23/19 12:44 DC 5/20/19 13:50


15 MG


 


 Lidocaine HCl


  (Lidocaine Pf 2%


 Vial)  5 ml  STK-MED ONCE  5/24/19 13:22


 5/24/19 13:23 DC  





 


 Lidocaine HCl


  (Xylocaine-Mpf


 1% 2ml Vial)  2 ml  1X PRN  PRN  5/24/19 13:00


 5/24/19 18:00 DC  





 


 Lorazepam


  (Ativan Inj)  1 mg  PRN Q8HRS  PRN  5/17/19 17:45


 5/25/19 11:19 DC 5/24/19 13:05


1 MG


 


 Losartan Potassium


  (Cozaar)  50 mg  DAILY  5/5/19 12:00


 5/12/19 08:22 DC 5/11/19 08:37


50 MG


 


 Meropenem 500 mg/


 Sodium Chloride  50 ml @ 


 100 mls/hr  Q8HRS  5/28/19 11:00


    5/28/19 13:03


100 MLS/HR


 


 Methylprednisolone


 Sodium Succinate


  (SOLU-Medrol


 40MG VIAL)  40 mg  DAILY07  5/20/19 08:00


 5/23/19 08:34 DC 5/22/19 06:10


40 MG


 


 Methylprednisolone


 Sodium Succinate


  (SOLU-Medrol


 125MG VIAL)  60 mg  BID76  5/14/19 18:00


 5/16/19 09:06 DC 5/15/19 18:09


60 MG


 


 Metoclopramide HCl


  (Reglan Vial)  5 mg  1X  ONCE  5/26/19 10:00


 5/26/19 10:01 DC 5/26/19 10:09


5 MG


 


 Midazolam HCl


  (Versed)  2 mg  PRN 1X  PRN  5/24/19 13:00


 5/24/19 18:00 DC  





 


 Morphine Sulfate


  (Roxanol Conc)  10 mg  PRN Q2HRS  PRN  5/17/19 17:45


   UNV  





 


 Nicotine


  (Nicoderm Cq


 14mg)  1 patch  DAILY  5/21/19 09:00


 5/27/19 07:06 DC 5/24/19 10:13


1 PATCH


 


 Nicotine


  (Nicoderm Cq 7mg)  1 patch  DAILY  5/27/19 09:00


    5/28/19 09:54


1 PATCH


 


 Non-Formulary


 Medication


  (Losartan/


 Hydrochlorothiazide


  (Losartan-Hctz


 50-12.5 Mg Tab))  1 tab  DAILY  5/6/19 09:00


   UNV  





 


 Norepinephrine


 Bitartrate  250 ml @ 


 1.875 mls/


 hr  CONT  PRN  5/5/19 14:00


 5/17/19 08:47 DC 5/5/19 23:03


24.375 MLS/HR


 


 Ondansetron HCl


  (Zofran)  4 mg  PRN Q6HRS  PRN  5/26/19 15:30


    5/26/19 15:31


4 MG


 


 Polyethylene


 Glycol


  (miraLAX PACKET)  17 gm  PRN DAILY  PRN  5/9/19 19:15


 5/25/19 11:19 DC  





 


 Potassium


 Chloride/Dextrose/


 Sod Cl  1,000 ml @ 


 30 mls/hr  Q24H  5/16/19 10:00


 5/18/19 17:02 DC 5/17/19 21:23


60 MLS/HR


 


 Propofol  20 ml @ As


 Directed  STK-MED ONCE  5/24/19 13:22


 5/24/19 13:23 DC  





 


 Ringer's Solution  1,000 ml @ 


 125 mls/hr  Q8H  5/24/19 12:55


 5/25/19 00:54 DC  





 


 Simvastatin


  (Zocor)  40 mg  QHS  5/5/19 21:00


    5/27/19 21:36


40 MG


 


 Sodium Bicarbonate


  (Sodium Bicarb


 Adult 8.4% Syr)  100 meq  1X  ONCE  5/6/19 09:30


 5/6/19 09:33 DC 5/6/19 09:43


100 MEQ


 


 Sodium Chloride  500 ml @ 


 500 mls/hr  1X PRN  PRN  5/13/19 13:30


 5/17/19 09:26 DC  





 


 Vancomycin HCl  250 ml @ 


 250 mls/hr  1X  ONCE  5/5/19 07:45


 5/5/19 08:44 UNV  





 


 Vancomycin HCl 2


 gm/Sodium Chloride  500 ml @ 


 250 mls/hr  1X  ONCE  5/5/19 08:00


 5/5/19 09:59 DC 5/5/19 08:00


250 MLS/HR











Labs:


Lab


Labs from 5/25/2019 reviewed





Objective:


Assessment:


Fever . Aspiration pneumonia


Blood cultures done


UA,urine culture





Dysphagia


Status post PEG tube placement





Acute respiratory failure





Encephalopathy etiology unclear





Penicillin allergy





Plan:


Plan of Care


Agree with Merrem


Follow cultures and lab in the a.m.


Family is deciding for possibly comfort measures per team


Antibiotics can be discontinued depending on goals of treatment


Overall prognosis poor


D/w nursing











DAVID BANERJEE MD           May 28, 2019 13:53 General Sunscreen Counseling: I recommended a broad spectrum sunscreen with a SPF of 30 or higher.  I explained that SPF 30 sunscreens block approximately 97 percent of the sun's harmful rays.  Sunscreens should be applied at least 15 minutes prior to expected sun exposure and then every 2 hours after that as long as sun exposure continues. If swimming or exercising sunscreen should be reapplied every 45 minutes to an hour after getting wet or sweating.  One ounce, or the equivalent of a shot glass full of sunscreen, is adequate to protect the skin not covered by a bathing suit. I also recommended a lip balm with a sunscreen as well. Sun protective clothing can be used in lieu of sunscreen but must be worn the entire time you are exposed to the sun's rays. Detail Level: Detailed

## 2025-04-01 NOTE — PDOC
Provider Note


Provider Note


stable, no dyspnea- declines rehab- labs ok- can dc when ok w/ pulm, same home 

meds and home O2











GUS CHERY MD Mar 5, 2019 08:13 No